# Patient Record
Sex: MALE | Race: WHITE | URBAN - METROPOLITAN AREA
[De-identification: names, ages, dates, MRNs, and addresses within clinical notes are randomized per-mention and may not be internally consistent; named-entity substitution may affect disease eponyms.]

---

## 2019-05-05 ENCOUNTER — INPATIENT (INPATIENT)
Facility: HOSPITAL | Age: 84
LOS: 2 days | Discharge: HOME HEALTH | End: 2019-05-08
Attending: HOSPITALIST | Admitting: HOSPITALIST
Payer: MEDICARE

## 2019-05-05 VITALS — WEIGHT: 184.97 LBS

## 2019-05-05 DIAGNOSIS — Z90.49 ACQUIRED ABSENCE OF OTHER SPECIFIED PARTS OF DIGESTIVE TRACT: Chronic | ICD-10-CM

## 2019-05-05 LAB
ALBUMIN SERPL ELPH-MCNC: 3.3 G/DL — SIGNIFICANT CHANGE UP (ref 3.3–5)
ALP SERPL-CCNC: 96 U/L — SIGNIFICANT CHANGE UP (ref 40–120)
ALT FLD-CCNC: 22 U/L — SIGNIFICANT CHANGE UP (ref 12–78)
ANION GAP SERPL CALC-SCNC: 5 MMOL/L — SIGNIFICANT CHANGE UP (ref 5–17)
APPEARANCE UR: CLEAR — SIGNIFICANT CHANGE UP
APTT BLD: 35.3 SEC — SIGNIFICANT CHANGE UP (ref 27.5–36.3)
AST SERPL-CCNC: 22 U/L — SIGNIFICANT CHANGE UP (ref 15–37)
BACTERIA # UR AUTO: NEGATIVE — SIGNIFICANT CHANGE UP
BASOPHILS # BLD AUTO: 0.03 K/UL — SIGNIFICANT CHANGE UP (ref 0–0.2)
BASOPHILS NFR BLD AUTO: 0.5 % — SIGNIFICANT CHANGE UP (ref 0–2)
BILIRUB SERPL-MCNC: 0.8 MG/DL — SIGNIFICANT CHANGE UP (ref 0.2–1.2)
BILIRUB UR-MCNC: NEGATIVE — SIGNIFICANT CHANGE UP
BUN SERPL-MCNC: 23 MG/DL — SIGNIFICANT CHANGE UP (ref 7–23)
CALCIUM SERPL-MCNC: 8.5 MG/DL — SIGNIFICANT CHANGE UP (ref 8.5–10.1)
CHLORIDE SERPL-SCNC: 107 MMOL/L — SIGNIFICANT CHANGE UP (ref 96–108)
CO2 SERPL-SCNC: 30 MMOL/L — SIGNIFICANT CHANGE UP (ref 22–31)
COLOR SPEC: YELLOW — SIGNIFICANT CHANGE UP
CREAT SERPL-MCNC: 1.19 MG/DL — SIGNIFICANT CHANGE UP (ref 0.5–1.3)
DIFF PNL FLD: ABNORMAL
EOSINOPHIL # BLD AUTO: 0.04 K/UL — SIGNIFICANT CHANGE UP (ref 0–0.5)
EOSINOPHIL NFR BLD AUTO: 0.6 % — SIGNIFICANT CHANGE UP (ref 0–6)
EPI CELLS # UR: NEGATIVE — SIGNIFICANT CHANGE UP
GLUCOSE SERPL-MCNC: 98 MG/DL — SIGNIFICANT CHANGE UP (ref 70–99)
GLUCOSE UR QL: NEGATIVE MG/DL — SIGNIFICANT CHANGE UP
HCT VFR BLD CALC: 35.1 % — LOW (ref 39–50)
HGB BLD-MCNC: 11.3 G/DL — LOW (ref 13–17)
IMM GRANULOCYTES NFR BLD AUTO: 0.6 % — SIGNIFICANT CHANGE UP (ref 0–1.5)
INR BLD: 2.21 RATIO — HIGH (ref 0.88–1.16)
KETONES UR-MCNC: NEGATIVE — SIGNIFICANT CHANGE UP
LEUKOCYTE ESTERASE UR-ACNC: NEGATIVE — SIGNIFICANT CHANGE UP
LYMPHOCYTES # BLD AUTO: 1.01 K/UL — SIGNIFICANT CHANGE UP (ref 1–3.3)
LYMPHOCYTES # BLD AUTO: 16.3 % — SIGNIFICANT CHANGE UP (ref 13–44)
MAGNESIUM SERPL-MCNC: 2.2 MG/DL — SIGNIFICANT CHANGE UP (ref 1.6–2.6)
MCHC RBC-ENTMCNC: 32.2 GM/DL — SIGNIFICANT CHANGE UP (ref 32–36)
MCHC RBC-ENTMCNC: 32.2 PG — SIGNIFICANT CHANGE UP (ref 27–34)
MCV RBC AUTO: 100 FL — SIGNIFICANT CHANGE UP (ref 80–100)
MONOCYTES # BLD AUTO: 0.54 K/UL — SIGNIFICANT CHANGE UP (ref 0–0.9)
MONOCYTES NFR BLD AUTO: 8.7 % — SIGNIFICANT CHANGE UP (ref 2–14)
NEUTROPHILS # BLD AUTO: 4.52 K/UL — SIGNIFICANT CHANGE UP (ref 1.8–7.4)
NEUTROPHILS NFR BLD AUTO: 73.3 % — SIGNIFICANT CHANGE UP (ref 43–77)
NITRITE UR-MCNC: NEGATIVE — SIGNIFICANT CHANGE UP
NRBC # BLD: 0 /100 WBCS — SIGNIFICANT CHANGE UP (ref 0–0)
NT-PROBNP SERPL-SCNC: 1430 PG/ML — HIGH (ref 0–450)
PH UR: 6 — SIGNIFICANT CHANGE UP (ref 5–8)
PLATELET # BLD AUTO: 182 K/UL — SIGNIFICANT CHANGE UP (ref 150–400)
POTASSIUM SERPL-MCNC: 4.5 MMOL/L — SIGNIFICANT CHANGE UP (ref 3.5–5.3)
POTASSIUM SERPL-SCNC: 4.5 MMOL/L — SIGNIFICANT CHANGE UP (ref 3.5–5.3)
PROT SERPL-MCNC: 7 GM/DL — SIGNIFICANT CHANGE UP (ref 6–8.3)
PROT UR-MCNC: NEGATIVE MG/DL — SIGNIFICANT CHANGE UP
PROTHROM AB SERPL-ACNC: 25.1 SEC — HIGH (ref 10–12.9)
RBC # BLD: 3.51 M/UL — LOW (ref 4.2–5.8)
RBC # FLD: 14.6 % — HIGH (ref 10.3–14.5)
RBC CASTS # UR COMP ASSIST: SIGNIFICANT CHANGE UP /HPF (ref 0–4)
SODIUM SERPL-SCNC: 142 MMOL/L — SIGNIFICANT CHANGE UP (ref 135–145)
SP GR SPEC: 1.01 — SIGNIFICANT CHANGE UP (ref 1.01–1.02)
TROPONIN I SERPL-MCNC: <0.015 NG/ML — SIGNIFICANT CHANGE UP (ref 0.01–0.04)
TROPONIN I SERPL-MCNC: <0.015 NG/ML — SIGNIFICANT CHANGE UP (ref 0.01–0.04)
UROBILINOGEN FLD QL: 1 MG/DL
WBC # BLD: 6.18 K/UL — SIGNIFICANT CHANGE UP (ref 3.8–10.5)
WBC # FLD AUTO: 6.18 K/UL — SIGNIFICANT CHANGE UP (ref 3.8–10.5)
WBC UR QL: NEGATIVE — SIGNIFICANT CHANGE UP

## 2019-05-05 PROCEDURE — 93010 ELECTROCARDIOGRAM REPORT: CPT

## 2019-05-05 PROCEDURE — 99285 EMERGENCY DEPT VISIT HI MDM: CPT | Mod: 25

## 2019-05-05 PROCEDURE — 71045 X-RAY EXAM CHEST 1 VIEW: CPT | Mod: 26

## 2019-05-05 PROCEDURE — 93970 EXTREMITY STUDY: CPT | Mod: 26

## 2019-05-05 RX ORDER — ASPIRIN/CALCIUM CARB/MAGNESIUM 324 MG
324 TABLET ORAL ONCE
Qty: 0 | Refills: 0 | Status: COMPLETED | OUTPATIENT
Start: 2019-05-05 | End: 2019-05-05

## 2019-05-05 RX ORDER — FUROSEMIDE 40 MG
40 TABLET ORAL ONCE
Qty: 0 | Refills: 0 | Status: COMPLETED | OUTPATIENT
Start: 2019-05-05 | End: 2019-05-05

## 2019-05-05 RX ADMIN — Medication 324 MILLIGRAM(S): at 18:32

## 2019-05-05 RX ADMIN — Medication 40 MILLIGRAM(S): at 20:27

## 2019-05-05 NOTE — ED ADULT NURSE NOTE - NSIMPLEMENTINTERV_GEN_ALL_ED
Implemented All Fall with Harm Risk Interventions:  [place] to call system. Call bell, personal items and telephone within reach. Instruct patient to call for assistance. Room bathroom lighting operational. Non-slip footwear when patient is off stretcher. Physically safe environment: no spills, clutter or unnecessary equipment. Stretcher in lowest position, wheels locked, appropriate side rails in place. Provide visual cue, wrist band, yellow gown, etc. Monitor gait and stability. Monitor for mental status changes and reorient to person, place, and time. Review medications for side effects contributing to fall risk. Reinforce activity limits and safety measures with patient and family. Provide visual clues: red socks.

## 2019-05-05 NOTE — ED STATDOCS - NS_ ATTENDINGSCRIBEDETAILS _ED_A_ED_FT
I, Juanito Begum MD,  performed the initial face to face bedside interview with this patient regarding history of present illness, review of symptoms and relevant past medical, social and family history.  I completed an independent physical examination.  I was the initial provider who evaluated this patient.  The history, relevant review of systems, past medical and surgical history, medical decision making, and physical examination was documented by the scribe in my presence and I attest to the accuracy of the documentation.

## 2019-05-05 NOTE — ED ADULT NURSE NOTE - NS ED NURSE REPORT GIVEN TO FT
Have You Had Red Spots Treated With Laser Before?: has had previous treatments
When Outside In The Sun, Do You...: mostly tans, rarely burns
Additional History: No autoimmune,hiv,HSV,hepatitis,diabetes,pregnancy or breast feeding
XIN Oliveira

## 2019-05-05 NOTE — ED STATDOCS - PROGRESS NOTE DETAILS
Joanne Singer for attending Dr. Begum: 89 y/o male with a PMHx of pacemaker presents to the ED c/o CP. +SOB, +weakness, +nausea. Sx started this morning. No other complaints at this time. Will send pt to main ED for further evaluation.

## 2019-05-05 NOTE — ED PROVIDER NOTE - CLINICAL SUMMARY MEDICAL DECISION MAKING FREE TEXT BOX
Pt with likely CHF as cause of symptoms, given Lasix, admitted to medicine service for cardiology evaluation.

## 2019-05-05 NOTE — ED ADULT TRIAGE NOTE - CHIEF COMPLAINT QUOTE
pt presents to ED due to Chest pain SOB and weakness since this AM as per daughter pt resting comfortably at this time

## 2019-05-05 NOTE — ED PROVIDER NOTE - PROGRESS NOTE DETAILS
Joanne Blanco: Maria Antonia, the daughter, left her number: [phone]. Keenan, the son, left his number: [phone].

## 2019-05-05 NOTE — H&P ADULT - NSICDXPASTMEDICALHX_GEN_ALL_CORE_FT
PAST MEDICAL HISTORY:  BPH (benign prostatic hyperplasia)     Coronary artery disease     CVA (cerebrovascular accident)     HTN (hypertension)     Pacemaker

## 2019-05-05 NOTE — H&P ADULT - NSHPSOCIALHISTORY_GEN_ALL_CORE
Pt lives alone at home, has home health aid who comes every day for 10hrs/day, uses walker and cane at home Pt lives alone at home, has home health aid who comes every day for 10hrs/day, uses walker and cane at home; h/o smoking , quit ~40 years ago, denies alcohol use

## 2019-05-05 NOTE — H&P ADULT - ATTENDING COMMENTS
Patient seen and examined after initial evaluation above by family medicine resident. Case discussed and reviewed in detail. Please note my plan below    89 y/o M with PMH of CAD s/p PCI, a-fib (on coumadin), PPM, HTN, CVA, BPH, depression, p/w CP + SOB      *Chest Pain - r/o ACS  -ASA   -Tele monitoring  -Cardio consult  -Trend Troponins  -Serial EKG    *SOB / LE edema   -Possible CHF  -Echo  -S/p Lasix IV in ED, will c/w Lasix 20 IV daily for now   -Cardio consult  -I/Os + Daily weights   -On BB  -If CHF confirmed -> will need to start ACEi    *Abdominal pain / nausea / loss of appetite  -As per patient, symptoms have completely resolved.  -If symptoms return -> consider CT abdomen     *H/o A-fib  -On coumadin    *H/o HTN / CVA / BPH / depression  -C/w home meds and outpatient management     *DVT ppx  -Coumadin

## 2019-05-05 NOTE — ED PROVIDER NOTE - OBJECTIVE STATEMENT
89 y/o male with a PMHx of pacemaker s/p stents presents to the ED c/o chest pain x3 days ago. +SOB, +weakness, +nausea. Pt states he is feeling a chest pressure and a burning feeling in his abd area x3 days ago. +nausea. Denies vomiting. No other complaints at this time. As per daughter, she has not noticed his leg swelling recently and has been losing his appetite as well. Pt speaks Hebrew and has daughter translating for him at bedside.

## 2019-05-05 NOTE — H&P ADULT - NSHPPHYSICALEXAM_GEN_ALL_CORE
Vital Signs Last 24 Hrs  T(C): 36.4 (05 May 2019 20:28), Max: 36.6 (05 May 2019 17:30)  T(F): 97.5 (05 May 2019 20:28), Max: 97.8 (05 May 2019 17:30)  HR: 60 (05 May 2019 21:30) (60 - 62)  BP: 132/68 (05 May 2019 21:30) (132/68 - 167/62)  RR: 19 (05 May 2019 21:58) (12 - 20)  SpO2: 100% (05 May 2019 21:58) (91% - 100%)

## 2019-05-05 NOTE — H&P ADULT - ASSESSMENT
89 yo male with h/o CAD (with stents), reported h/o Afib, has pacemaker, on coumadin, HTN, CVA (8-10 yrs ago), BPH, depression, presenting with c/o chest discomfort, nausea and loss of appetite, pt also with b/l LE edema and elevated BNP, CXR with R pleural effusion (official read pending), concerning for likely acute CHF.    # Chest discomfort  - Likely secondary to acute CHF  - EKG with NSR  - Trop neg x2  - BNP of 1430  - CXR - R pleural effusion, + cardiomegaly per read by me. F/U official read  - Pt with b/l LE edema  - f/u 3rd troponin  - f/u LE doppler US  - Echo in the AM  - Cardiology consult    # CAD with stents  - Not on ASA, pt reports h/o gastritis    #h/o Afib  - Continue home coumadin  - INR therapeutic  - Continue home BB  - CHADSVASc score 6    #BPH  - Continue home tamsulosin and finasteride    #Depression  - Continue on home zoloft    #DVT ppx  - On coumadin 89 yo male with h/o CAD (with stents), reported h/o Afib, has pacemaker, on coumadin, HTN, CVA (8-10 yrs ago), BPH, depression, presenting with c/o chest discomfort, pressure in abdomen, nausea and loss of appetite, pt also with b/l LE edema and elevated BNP, CXR with R pleural effusion (official read pending), concerning for likely acute CHF.    # Chest discomfort  - Likely secondary to acute CHF  - EKG - ventricular paced, no obvious ST-T wave changes  - Trop neg x2  - BNP of 1430  - Tele monitoring  - CXR - R pleural effusion, + cardiomegaly per read by me. F/U official read  - s/p ASA 325mg in the ED  - s/p Lasix 40mg IV in the ED  - Pt with b/l LE edema  - Continue on lasix 20mg IV daily  - Daily weight, strict I/Os  - f/u 3rd troponin  - f/u LE doppler US  - f/u AM EKG  - Echo in the AM  - Cardiology consult  - Consider ACE-I pending echo result    #Abdominal pressure, nausea  - Pt with resolved complaints on examination, no tenderness    # CAD with stents  - Not on ASA, pt reports h/o gastritis  - Pt not on statin, pt reports that he was never placed on statin med - reason unknown  - Will start atorvastatin 40mg daily    #h/o Afib  - Continue home coumadin  - INR therapeutic  - Continue home BB  - CHADSVASc score 6    #BPH  - Continue home tamsulosin and finasteride    #Depression  - Continue on home zoloft    #DVT ppx  - On coumadin 89 yo male with h/o CAD (with stents), reported h/o Afib, has pacemaker, on coumadin, HTN, CVA (8-10 yrs ago), BPH, depression, presenting with c/o chest discomfort, pressure in abdomen, nausea and loss of appetite, pt also with b/l LE edema and elevated BNP, CXR with R pleural effusion (official read pending), concerning for likely acute CHF.    # Chest discomfort  - Likely secondary to acute CHF  - EKG - ventricular paced, no obvious ST-T wave changes  - Trop neg x2  - BNP of 1430  - Tele monitoring  - CXR - R pleural effusion, + cardiomegaly per read by me. F/U official read  - s/p ASA 325mg in the ED  - s/p Lasix 40mg IV in the ED  - Pt with b/l LE edema  - Continue on lasix 20mg IV daily  - Daily weight, strict I/Os  - f/u 3rd troponin  - f/u LE doppler US  - f/u AM EKG  - Echo in the AM  - Cardiology consult  - Consider ACE-I pending echo result    #Abdominal pressure, nausea  - Pt with resolved complaints on examination, no tenderness    # CAD with stents  - Not on ASA, pt reports h/o gastritis  - Pt not on statin, pt reports that he was never placed on statin med - reason unknown.   - Will start atorvastatin 40mg daily    #h/o Afib  - Continue home coumadin  - INR therapeutic  - Continue home BB  - CHADSVASc score 6    #BPH  - Continue home tamsulosin and finasteride    #Depression  - Continue on home zoloft    #DVT ppx  - On coumadin

## 2019-05-05 NOTE — H&P ADULT - HISTORY OF PRESENT ILLNESS
89 yo male with h/o CAD (with stents), reported h/o Afib, has pacemaker, on coumadin, HTN, CVA (8-10 yrs ago), BPH, depression, presenting with c/o chest discomfort, SOB, nausea and loss of appetite. Per pt, his sxs started with feeling of pressure in the upper abdomen and midsternal region 3 days ago. He has been unable to eat as a result of the pressure he feels in the sternal region, also has been feeling nauseous and with weakness. Pt states that it is the nausea, abdominal pressure, loss of appetite and feeling depressed that brought him to the ED. He denies any aggravating or alleviating factors, denies actual chest pain and/or radiation of pain to the neck or arm. Per ED note, pt also said to have SOB. Pt however denied SOB at home when asked. He reports sleeping supine at night without any difficulty breathing. In addition to this, pt also has b/l LE swelling without pain, which he reports has been present for a long time. Pt reports no previous history/diagnosis of CHF, and has never has to take lasix. Pt denied any chest pain/pressure, abdominal pain, n/v, cough, SOB at the time examined in the ED by me. No fever or chills at home.  I spoke over the phone with pt's daughter (Ana Cristina Berger) who states that pt did complain of chest pressure and pressure in the upper abdomen today. For the past 3 days has been complaining of pressure in the abdomen, feeling weak, with loss of appetite. The complain of chest pressure, as per daughter, was mainly on day of ED presentation. Per daughter, swelling in b/l LE has also been present for years (4-5 years) but appeared more pronounced on day of ED presentation.    In the ED, pt received ASA 325mg x1, CXR was obtained showed R lung pleural effusion (official read pending), BNP of 1430, trops neg x2. Pt was given a dose of IV lasix 40mg. LE doppler US was ordered. 89 yo male with h/o CAD (with stents), reported h/o Afib, has pacemaker, on coumadin, HTN, CVA (8-10 yrs ago), BPH, depression, presenting with c/o chest discomfort, SOB, nausea and loss of appetite. Per pt, his sxs started with feeling of pressure in the upper abdomen and midsternal region 3 days ago. He has been unable to eat as a result of the pressure he feels in the sternal region, also has been feeling nauseous and with weakness. Pt states that it is the nausea, abdominal pressure, loss of appetite and feeling depressed that brought him to the ED. He denies any aggravating or alleviating factors, denies actual chest pain and/or radiation of pain to the neck or arm. Per ED note, pt also said to have SOB. Pt however denied SOB at home when asked. He reports sleeping supine at night without any difficulty breathing. In addition to this, pt also has b/l LE swelling without pain, which he reports has been present for a long time. Pt reports no previous history/diagnosis of CHF, and has never had to take lasix. Pt denied any chest pain/pressure, abdominal pain, n/v, cough, SOB at the time examined in the ED by me. No fever or chills at home.  I spoke over the phone with pt's daughter (Ana Cristina Berger) who states that pt did complain of chest pressure and pressure in the upper abdomen today. For the past 3 days has been complaining of pressure in the abdomen, feeling weak, with loss of appetite. The complain of chest pressure, as per daughter, was mainly on day of ED presentation. Per daughter, swelling in b/l LE has also been present for years (4-5 years) but appeared more pronounced on day of ED presentation.    In the ED, pt received ASA 325mg x1, CXR was obtained showed R lung pleural effusion (official read pending), BNP of 1430, trops neg x2. Pt was given a dose of IV lasix 40mg. LE doppler US was ordered. 89 yo male with h/o CAD (with stents), reported h/o Afib, has pacemaker, on coumadin, HTN, CVA (8-10 yrs ago), BPH, depression, presenting with c/o chest discomfort, SOB, nausea and loss of appetite. Per pt, his sxs started with feeling of pressure in the upper abdomen and midsternal region 3 days ago. He has been unable to eat as a result of the pressure he feels in the sternal region, also has been feeling nauseous and with weakness. Pt states that it is the nausea, abdominal pressure, loss of appetite and feeling depressed that brought him to the ED, but all the symptoms are presently resolved. He denies any aggravating or alleviating factors, denies actual chest pain and/or radiation of pain to the neck or arm. Per ED note, pt also said to have SOB. Pt however denied SOB at home when asked. He reports sleeping supine at night without any difficulty breathing. In addition to this, pt also has b/l LE swelling without pain, which he reports has been present for a long time. Pt reports no previous history/diagnosis of CHF, and has never had to take lasix. Pt denied any chest pain/pressure, abdominal pain, n/v, cough, SOB at the time examined in the ED by me. No fever or chills at home.  I spoke over the phone with pt's daughter (Ana Cristina Berger) who states that pt did complain of chest pressure and pressure in the upper abdomen today. For the past 3 days has been complaining of pressure in the abdomen, feeling weak, with loss of appetite. The complain of chest pressure, as per daughter, was mainly on day of ED presentation. Per daughter, swelling in b/l LE has also been present for years (4-5 years) but appeared more pronounced on day of ED presentation.    In the ED, pt received ASA 325mg x1, CXR was obtained showed R lung pleural effusion (official read pending), BNP of 1430, trops neg x2. Pt was given a dose of IV lasix 40mg. LE doppler US was ordered.

## 2019-05-06 LAB
ANION GAP SERPL CALC-SCNC: 4 MMOL/L — LOW (ref 5–17)
APTT BLD: 39.1 SEC — HIGH (ref 27.5–36.3)
BUN SERPL-MCNC: 20 MG/DL — SIGNIFICANT CHANGE UP (ref 7–23)
CALCIUM SERPL-MCNC: 8.8 MG/DL — SIGNIFICANT CHANGE UP (ref 8.5–10.1)
CHLORIDE SERPL-SCNC: 105 MMOL/L — SIGNIFICANT CHANGE UP (ref 96–108)
CO2 SERPL-SCNC: 32 MMOL/L — HIGH (ref 22–31)
CREAT SERPL-MCNC: 1.2 MG/DL — SIGNIFICANT CHANGE UP (ref 0.5–1.3)
GLUCOSE SERPL-MCNC: 80 MG/DL — SIGNIFICANT CHANGE UP (ref 70–99)
HCT VFR BLD CALC: 34.8 % — LOW (ref 39–50)
HGB BLD-MCNC: 11.2 G/DL — LOW (ref 13–17)
INR BLD: 2.27 RATIO — HIGH (ref 0.88–1.16)
MCHC RBC-ENTMCNC: 31.5 PG — SIGNIFICANT CHANGE UP (ref 27–34)
MCHC RBC-ENTMCNC: 32.2 GM/DL — SIGNIFICANT CHANGE UP (ref 32–36)
MCV RBC AUTO: 97.8 FL — SIGNIFICANT CHANGE UP (ref 80–100)
NRBC # BLD: 0 /100 WBCS — SIGNIFICANT CHANGE UP (ref 0–0)
PLATELET # BLD AUTO: 186 K/UL — SIGNIFICANT CHANGE UP (ref 150–400)
POTASSIUM SERPL-MCNC: 3.7 MMOL/L — SIGNIFICANT CHANGE UP (ref 3.5–5.3)
POTASSIUM SERPL-SCNC: 3.7 MMOL/L — SIGNIFICANT CHANGE UP (ref 3.5–5.3)
PROTHROM AB SERPL-ACNC: 25.9 SEC — HIGH (ref 10–12.9)
RBC # BLD: 3.56 M/UL — LOW (ref 4.2–5.8)
RBC # FLD: 14.6 % — HIGH (ref 10.3–14.5)
SODIUM SERPL-SCNC: 141 MMOL/L — SIGNIFICANT CHANGE UP (ref 135–145)
WBC # BLD: 4.53 K/UL — SIGNIFICANT CHANGE UP (ref 3.8–10.5)
WBC # FLD AUTO: 4.53 K/UL — SIGNIFICANT CHANGE UP (ref 3.8–10.5)

## 2019-05-06 PROCEDURE — 93010 ELECTROCARDIOGRAM REPORT: CPT

## 2019-05-06 PROCEDURE — 93306 TTE W/DOPPLER COMPLETE: CPT | Mod: 26

## 2019-05-06 RX ORDER — FAMOTIDINE 10 MG/ML
20 INJECTION INTRAVENOUS DAILY
Qty: 0 | Refills: 0 | Status: DISCONTINUED | OUTPATIENT
Start: 2019-05-06 | End: 2019-05-08

## 2019-05-06 RX ORDER — TAMSULOSIN HYDROCHLORIDE 0.4 MG/1
0.4 CAPSULE ORAL AT BEDTIME
Qty: 0 | Refills: 0 | Status: DISCONTINUED | OUTPATIENT
Start: 2019-05-06 | End: 2019-05-08

## 2019-05-06 RX ORDER — FUROSEMIDE 40 MG
20 TABLET ORAL EVERY 12 HOURS
Qty: 0 | Refills: 0 | Status: DISCONTINUED | OUTPATIENT
Start: 2019-05-06 | End: 2019-05-07

## 2019-05-06 RX ORDER — LANOLIN ALCOHOL/MO/W.PET/CERES
5 CREAM (GRAM) TOPICAL AT BEDTIME
Qty: 0 | Refills: 0 | Status: DISCONTINUED | OUTPATIENT
Start: 2019-05-06 | End: 2019-05-08

## 2019-05-06 RX ORDER — FAMOTIDINE 10 MG/ML
20 INJECTION INTRAVENOUS EVERY 24 HOURS
Qty: 0 | Refills: 0 | Status: DISCONTINUED | OUTPATIENT
Start: 2019-05-06 | End: 2019-05-06

## 2019-05-06 RX ORDER — WARFARIN SODIUM 2.5 MG/1
2 TABLET ORAL ONCE
Qty: 0 | Refills: 0 | Status: COMPLETED | OUTPATIENT
Start: 2019-05-06 | End: 2019-05-06

## 2019-05-06 RX ORDER — ATORVASTATIN CALCIUM 80 MG/1
40 TABLET, FILM COATED ORAL AT BEDTIME
Qty: 0 | Refills: 0 | Status: DISCONTINUED | OUTPATIENT
Start: 2019-05-06 | End: 2019-05-08

## 2019-05-06 RX ORDER — FUROSEMIDE 40 MG
20 TABLET ORAL DAILY
Qty: 0 | Refills: 0 | Status: DISCONTINUED | OUTPATIENT
Start: 2019-05-06 | End: 2019-05-06

## 2019-05-06 RX ORDER — TIMOLOL 0.5 %
1 DROPS OPHTHALMIC (EYE) DAILY
Qty: 0 | Refills: 0 | Status: DISCONTINUED | OUTPATIENT
Start: 2019-05-06 | End: 2019-05-08

## 2019-05-06 RX ORDER — SERTRALINE 25 MG/1
25 TABLET, FILM COATED ORAL DAILY
Qty: 0 | Refills: 0 | Status: DISCONTINUED | OUTPATIENT
Start: 2019-05-06 | End: 2019-05-08

## 2019-05-06 RX ORDER — FINASTERIDE 5 MG/1
5 TABLET, FILM COATED ORAL DAILY
Qty: 0 | Refills: 0 | Status: DISCONTINUED | OUTPATIENT
Start: 2019-05-06 | End: 2019-05-08

## 2019-05-06 RX ORDER — METOPROLOL TARTRATE 50 MG
25 TABLET ORAL DAILY
Qty: 0 | Refills: 0 | Status: DISCONTINUED | OUTPATIENT
Start: 2019-05-06 | End: 2019-05-08

## 2019-05-06 RX ADMIN — FINASTERIDE 5 MILLIGRAM(S): 5 TABLET, FILM COATED ORAL at 13:36

## 2019-05-06 RX ADMIN — TAMSULOSIN HYDROCHLORIDE 0.4 MILLIGRAM(S): 0.4 CAPSULE ORAL at 22:45

## 2019-05-06 RX ADMIN — SERTRALINE 25 MILLIGRAM(S): 25 TABLET, FILM COATED ORAL at 13:36

## 2019-05-06 RX ADMIN — ATORVASTATIN CALCIUM 40 MILLIGRAM(S): 80 TABLET, FILM COATED ORAL at 22:45

## 2019-05-06 RX ADMIN — WARFARIN SODIUM 2 MILLIGRAM(S): 2.5 TABLET ORAL at 22:45

## 2019-05-06 RX ADMIN — Medication 20 MILLIGRAM(S): at 17:31

## 2019-05-06 RX ADMIN — FAMOTIDINE 20 MILLIGRAM(S): 10 INJECTION INTRAVENOUS at 06:14

## 2019-05-06 RX ADMIN — Medication 5 MILLIGRAM(S): at 22:45

## 2019-05-06 RX ADMIN — Medication 25 MILLIGRAM(S): at 06:15

## 2019-05-06 RX ADMIN — Medication 1 DROP(S): at 13:38

## 2019-05-06 NOTE — CHART NOTE - NSCHARTNOTEFT_GEN_A_CORE
Pt seen and examined with house staff.  Plan formulated and reviewed on rounds     Briefly, 89 y/o male with PPM--AFIb on coumadin and HL admitted with CP found to have evidence of decompensated HF.  CXR--PVC.  BNP 1400.  LE edema    CP has resolved  No events overnight   ROS o/w negative     Stable vitals.  Warm  Awake and alert  NAD  LE 2+ Edema    INR 2.2  WBC 6  Trop Negative x 2    Acute Decompensated HF--no prior TTE    Check TTE  Increase furosemide to 20 IV q12  Daily wts  DASH diet  Daily INR--dose coumadin according    Tele monitoring

## 2019-05-06 NOTE — PROGRESS NOTE ADULT - ASSESSMENT
89 yo male with h/o CAD (with stents), reported h/o Afib, has pacemaker, on coumadin, HTN, CVA (8-10 yrs ago), BPH, depression, presenting with c/o chest discomfort, pressure in abdomen, nausea and loss of appetite, pt also with b/l LE edema and elevated BNP, CXR with R pleural effusion, concerning for likely acute CHF.    #Dyspnea  - Likely 2/2 CHF  - BNP of 1430 on admission  - will monitor on telemetry  - CXR - R pleural effusion and b/l congestion- official read pending  - s/p Lasix 40mg IV in the ED  - will start lasix 20 mg IV BID  - daily weights  - low salt diet  - 1.5 L daily fluid restriction  - strict I/Os  - f/u LE doppler US  - f/u ECHO   - Cardiology consult pending     #Chest discomfort  - likely 2/2 to GERD  - EKG - ventricular paced, no obvious ST-T wave changes  - Trop neg x2  - s/p ASA 325mg in the ED  - f/u 3rd troponin  - continue Pepcid  - cardiology consult pending    #CAD with stents  - not on aspirin due to hx of gastritis  - not on statin - unknown resason  - will hold off statins for now    #Afib  - CHADSVASc score 6  - rate controlled  - continue coumadin  - INR therapeutic on admission  - continue metoprolol   - monitor INR daily; goal 2-3    #Anemia  - normocytic  - no signs of active bleeding  - will monitor    #BPH  - continue tamsulosin   - continue finasteride    #Depression  - continue zoloft    #DVT ppx  - on coumadin 89 yo male with h/o CAD (with stents), reported h/o Afib, has pacemaker, on coumadin, HTN, CVA (8-10 yrs ago), BPH, depression, presenting with c/o chest discomfort, pressure in abdomen, nausea and loss of appetite, pt also with b/l LE edema and elevated BNP, CXR with R pleural effusion, concerning for likely acute CHF.    #Dyspnea  - Likely 2/2 CHF  - BNP of 1430 on admission  - will monitor on telemetry  - CXR - R pleural effusion and b/l congestion- official read pending  - s/p Lasix 40mg IV in the ED  - will start lasix 20 mg IV BID  - daily weights  - low salt diet  - 1.5 L daily fluid restriction  - strict I/Os  - f/u LE doppler US  - f/u ECHO   - Cardiology consult pending     #Chest discomfort  - likely 2/2 to GERD  - EKG - ventricular paced, no obvious ST-T wave changes  - Trop neg x2  - s/p ASA 325mg in the ED  - continue Pepcid  - cardiology consult pending    #CAD with stents  - not on aspirin due to hx of gastritis  - not on statin - unknown resason  - will hold off statins for now    #Afib  - CHADSVASc score 6  - rate controlled  - continue coumadin  - INR therapeutic on admission  - continue metoprolol   - monitor INR daily; goal 2-3    #Anemia  - normocytic  - no signs of active bleeding  - will monitor    #BPH  - continue tamsulosin   - continue finasteride    #Depression  - continue zoloft    #DVT ppx  - on coumadin 89 yo male with h/o CAD (with stents), reported h/o Afib, has pacemaker, on coumadin, HTN, CVA (8-10 yrs ago), BPH, depression, presenting with c/o chest discomfort, pressure in abdomen, nausea and loss of appetite, pt also with b/l LE edema and elevated BNP, CXR with R pleural effusion, concerning for likely acute CHF.    #Dyspnea  - Likely 2/2 CHF  - BNP of 1430 on admission  - will monitor on telemetry  - CXR - probable R pleural effusion   - s/p Lasix 40mg IV in the ED  - will start lasix 20 mg IV BID  - monitor electrolytes  - daily weights  - low salt diet  - 1.5 L daily fluid restriction  - strict I/Os  - LE doppler done for b/l LE edema - negative for DVT  - f/u ECHO   - Cardiology consult appreciated     #Chest discomfort  - likely 2/2 to GERD  - EKG - ventricular paced, no obvious ST-T wave changes  - Trop neg x2  - s/p ASA 325mg in the ED  - continue Pepcid  - cardiology consult appreciated    #CAD with stents  - not on aspirin due to hx of gastritis  - continue statin    #Afib  - CHADSVASc score 6  - rate controlled  - continue coumadin  - INR therapeutic on admission  - continue metoprolol   - monitor INR daily; goal 2-3  - pacemaker interrogation revealed afib w/ rate of 60bpm    #Anemia  - normocytic  - no signs of active bleeding  - will monitor    #BPH  - continue tamsulosin   - continue finasteride    #Depression  - continue zoloft    #DVT ppx  - on coumadin

## 2019-05-06 NOTE — PHARMACOTHERAPY INTERVENTION NOTE - COMMENTS
Obtained medication history from patient's daughter and list from [facility] . Confirmed with Dr. Del Cid
Recommended changing IV to PO
Recommended ordering warfarin dose for tonight

## 2019-05-06 NOTE — CONSULT NOTE ADULT - SUBJECTIVE AND OBJECTIVE BOX
Patient is a 89y old  Male who presents with a chief complaint of SOB and chest discomfort.     HPI:  89 yo male with h/o CAD (with stents), reported h/o Afib, has pacemaker, on coumadin, HTN, CVA (8-10 yrs ago), BPH, depression, presenting with c/o chest discomfort, SOB, nausea and loss of appetite. Pt this am was unable to give more history but states that he was feeling depressed as his legs were swollen.  He denies any SOB or CP this am.      In the ED, pt received ASA 325mg x1, CXR was obtained showed R lung pleural effusion (official read pending), BNP of 1430, trops neg x2. Pt was given a dose of IV lasix 40mg. LE doppler US was ordered.       PAST MEDICAL & SURGICAL HISTORY:  CVA (cerebrovascular accident)  BPH (benign prostatic hyperplasia)  HTN (hypertension)  Coronary artery disease  Pacemaker  History of appendectomy      MEDICATIONS  (STANDING):  atorvastatin 40 milliGRAM(s) Oral at bedtime  famotidine Injectable 20 milliGRAM(s) IV Push every 24 hours  finasteride 5 milliGRAM(s) Oral daily  furosemide   Injectable 20 milliGRAM(s) IV Push daily  metoprolol succinate ER 25 milliGRAM(s) Oral daily  sertraline 25 milliGRAM(s) Oral daily  tamsulosin 0.4 milliGRAM(s) Oral at bedtime  timolol 0.5% Solution 1 Drop(s) Both EYES daily    MEDICATIONS  (PRN):      FAMILY HISTORY:  unable to obtain from pt       SOCIAL HISTORY:  no recent smoking     REVIEW OF SYSTEMS:  CONSTITUTIONAL:    No fatigue, malaise, lethargy.  No fever or chills.  HEENT:  Eyes:  No visual changes.     ENT:  No epistaxis.  No sinus pain.    RESPIRATORY:  No cough.  No wheeze.  No hemoptysis.  c/o shortness of breath.  CARDIOVASCULAR:  c/o chest pains.  No palpitations. c/o shortness of breath, No orthopnea or PND.  GASTROINTESTINAL:  No abdominal pain.  No nausea or vomiting.    GENITOURINARY:    No hematuria.    MUSCULOSKELETAL:  No musculoskeletal pain.  No joint swelling.  No arthritis.  NEUROLOGICAL:  No tingling or numbness or weakness.  PSYCHIATRIC:  c/o depression        Vital Signs Last 24 Hrs  T(C): 36.3 (06 May 2019 04:29), Max: 36.6 (05 May 2019 17:30)  T(F): 97.4 (06 May 2019 04:29), Max: 97.8 (05 May 2019 17:30)  HR: 60 (06 May 2019 04:29) (59 - 62)  BP: 110/60 (06 May 2019 04:29) (110/60 - 167/62)  BP(mean): --  RR: 19 (06 May 2019 04:29) (12 - 20)  SpO2: 91% (06 May 2019 04:29) (91% - 100%)    PHYSICAL EXAM-    Constitutional: elderly frail and in no acute distress    Head: Head is normocephalic and atraumatic.      Neck:  No JVD.     Cardiovascular: Regular rate and rhythm without S3, S4. No murmurs or rubs are appreciated.      Respiratory: scattered rales b/l     Abdomen: Soft, nontender, nondistended with positive bowel sounds.      Extremity: No tenderness. 1+ pedal edema b/l   Neurologic: The patient is alert     Skin: No rash, no obvious lesions noted.      Psychiatric: The patient appears to be emotionally stable.      INTERPRETATION OF TELEMETRY:   SR   ECG: Sinus danythm ALYSSA paced.    I&O's Detail    05 May 2019 07:01  -  06 May 2019 07:00  --------------------------------------------------------  IN:  Total IN: 0 mL    OUT:    Voided: 1770 mL  Total OUT: 1770 mL    Total NET: -1770 mL          LABS:                        11.2   4.53  )-----------( 186      ( 06 May 2019 05:58 )             34.8     05-06    141  |  105  |  20  ----------------------------<  80  3.7   |  32<H>  |  1.20    Ca    8.8      06 May 2019 05:58  Mg     2.2     05-05    TPro  7.0  /  Alb  3.3  /  TBili  0.8  /  DBili  x   /  AST  22  /  ALT  22  /  AlkPhos  96  05-05    CARDIAC MARKERS ( 05 May 2019 20:41 )  <0.015 ng/mL / x     / x     / x     / x      CARDIAC MARKERS ( 05 May 2019 18:28 )  <0.015 ng/mL / x     / x     / x     / x          PT/INR - ( 06 May 2019 05:58 )   PT: 25.9 sec;   INR: 2.27 ratio         PTT - ( 06 May 2019 05:58 )  PTT:39.1 sec  Urinalysis Basic - ( 05 May 2019 20:23 )    Color: Yellow / Appearance: Clear / S.010 / pH: x  Gluc: x / Ketone: Negative  / Bili: Negative / Urobili: 1 mg/dL   Blood: x / Protein: Negative mg/dL / Nitrite: Negative   Leuk Esterase: Negative / RBC: 0-2 /HPF / WBC Negative   Sq Epi: x / Non Sq Epi: Negative / Bacteria: Negative      I&O's Summary    05 May 2019 07:01  -  06 May 2019 07:00  --------------------------------------------------------  IN: 0 mL / OUT: 1770 mL / NET: -1770 mL      BNPSerum Pro-Brain Natriuretic Peptide: 1430 pg/mL ( @ 18:28)    RADIOLOGY & ADDITIONAL STUDIES:

## 2019-05-06 NOTE — CONSULT NOTE ADULT - ASSESSMENT
1. SOB and chest pain- currently asymptomatic.  Cardiac enzymes did not reveal any ischemia.  EKG uninterpretable for ischemia.   Doubt angina.  Could be secondary to volume overload.  Will continue diuresis with iv lasix.  Can increase lasix to 40mg iv daily.  Diuresis with close monitoring of the renal function and electrolytes.  Goal potassium of 4 and magnesium of 2.   Strict I/O and daily wt checks. Low sodium diet. Nutrition education.     2. s/p pPM- interrogation requested.    3. CAD s/p PCI- off ASA as outpt.  Continue statin and rest of current medical regimen.    4. Atrial fibrillaiton- on full dose anticoagulation.    Other medical issues- Management per primary team.   Thank you for allowing me to participate in the care of this patient. Please feel free to contact me with any questions.

## 2019-05-06 NOTE — PROGRESS NOTE ADULT - SUBJECTIVE AND OBJECTIVE BOX
89 yo male with h/o CAD (with stents), reported h/o Afib, has pacemaker, on coumadin, HTN, CVA (8-10 yrs ago), BPH, depression, presenting with c/o chest discomfort, SOB, nausea and loss of appetite. Per pt, his sxs started with feeling of pressure in the upper abdomen and midsternal region 3 days ago. He has been unable to eat as a result of the pressure he feels in the sternal region, also has been feeling nauseous and with weakness. Pt states that it is the nausea, abdominal pressure, loss of appetite and feeling depressed that brought him to the ED, but all the symptoms are presently resolved. He denies any aggravating or alleviating factors, denies actual chest pain and/or radiation of pain to the neck or arm. Per ED note, pt also said to have SOB. Pt however denied SOB at home when asked. He reports sleeping supine at night without any difficulty breathing. In addition to this, pt also has b/l LE swelling without pain, which he reports has been present for a long time. Pt reports no previous history/diagnosis of CHF, and has never had to take lasix. Pt denied any chest pain/pressure, abdominal pain, n/v, cough, SOB at the time examined in the ED by me. No fever or chills at home.  I spoke over the phone with pt's daughter (Ana Cristina Berger) who states that pt did complain of chest pressure and pressure in the upper abdomen today. For the past 3 days has been complaining of pressure in the abdomen, feeling weak, with loss of appetite. The complain of chest pressure, as per daughter, was mainly on day of ED presentation. Per daughter, swelling in b/l LE has also been present for years (4-5 years) but appeared more pronounced on day of ED presentation.    In the ED, pt received ASA 325mg x1, CXR was obtained showed R lung pleural effusion (official read pending), BNP of 1430, trops neg x2. Pt was given a dose of IV lasix 40mg. LE doppler US was ordered.      Interval History: Patient evaluated at bedside. No overnight events. Endorses SOB. Denies chest pain, orthopnea, PND, cough, fever, chills.     REVIEW OF SYSTEMS:    CONSTITUTIONAL: No fevers or chills  EYES/ENT: No visual changes;  No vertigo or throat pain   NECK: No pain or stiffness  RESPIRATORY: No cough, wheezing, hemoptysis; +shortness of breath  CARDIOVASCULAR: No chest pain or palpitations  GASTROINTESTINAL: No abdominal or epigastric pain. No nausea, vomiting, or hematemesis; No diarrhea or constipation. No melena or hematochezia.  GENITOURINARY: No dysuria, frequency or hematuria  NEUROLOGICAL: No numbness or weakness  SKIN: No itching, burning, rashes, or lesions   All other review of systems is negative unless indicated above.    MEDICATIONS  (STANDING):  atorvastatin 40 milliGRAM(s) Oral at bedtime  famotidine    Tablet 20 milliGRAM(s) Oral daily  finasteride 5 milliGRAM(s) Oral daily  furosemide   Injectable 20 milliGRAM(s) IV Push every 12 hours  metoprolol succinate ER 25 milliGRAM(s) Oral daily  sertraline 25 milliGRAM(s) Oral daily  tamsulosin 0.4 milliGRAM(s) Oral at bedtime  timolol 0.5% Solution 1 Drop(s) Both EYES daily  warfarin 2 milliGRAM(s) Oral once    MEDICATIONS  (PRN):      Vital Signs (24 Hrs):  T(C): 36.6 (19 @ 10:48), Max: 36.6 (19 @ 17:30)  HR: 61 (19 @ 10:48) (59 - 62)  BP: 115/56 (19 @ 10:48) (110/60 - 167/62)  RR: 18 (19 @ 10:48) (12 - 20)  SpO2: 95% (19 @ 10:48) (91% - 100%)  Wt(kg): --  Daily     Daily Weight in k.4 (06 May 2019 06:28)    I&O's Summary    05 May 2019 07:01  -  06 May 2019 07:00  --------------------------------------------------------  IN: 0 mL / OUT: 1770 mL / NET: -1770 mL    PHYSICAL EXAM:  GENERAL: NAD, well-developed, comfortable  HEAD:  Atraumatic, Normocephalic  EYES: EOMI, PERRLA, conjunctiva and sclera clear  NECK: Supple, No JVD  CHEST/LUNG: diffuse  crackles b/l  HEART: Regular rate and rhythm; No murmurs, rubs, or gallops  ABDOMEN: Soft, Nontender, Nondistended; Bowel sounds present  EXTREMITIES:  1-2+ pitting edema b/l   PSYCH: AAOx3  NEUROLOGY: non-focal    LABS:                        11.2   4.53  )-----------( 186      ( 06 May 2019 05:58 )             34.8     06 May 2019 05:58    141    |  105    |  20     ----------------------------<  80     3.7     |  32     |  1.20     Ca    8.8        06 May 2019 05:58  Mg     2.2       05 May 2019 18:28    TPro  7.0    /  Alb  3.3    /  TBili  0.8    /  DBili  x      /  AST  22     /  ALT  22     /  AlkPhos  96     05 May 2019 18:28    PT/INR - ( 06 May 2019 05:58 )   PT: 25.9 sec;   INR: 2.27 ratio         PTT - ( 06 May 2019 05:58 )  PTT:39.1 sec    CARDIAC MARKERS ( 05 May 2019 20:41 )  <0.015 ng/mL / x     / x     / x     / x      CARDIAC MARKERS ( 05 May 2019 18:28 )  <0.015 ng/mL / x     / x     / x     / x        Serum Pro-Brain Natriuretic Peptide (19 @ 18:28)    Serum Pro-Brain Natriuretic Peptide: 1430 pg/mL 89 yo male with h/o CAD (with stents), reported h/o Afib, has pacemaker, on coumadin, HTN, CVA (8-10 yrs ago), BPH, depression, presenting with c/o chest discomfort, SOB, nausea and loss of appetite. Per pt, his sxs started with feeling of pressure in the upper abdomen and midsternal region 3 days ago. He has been unable to eat as a result of the pressure he feels in the sternal region, also has been feeling nauseous and with weakness. Pt states that it is the nausea, abdominal pressure, loss of appetite and feeling depressed that brought him to the ED, but all the symptoms are presently resolved. He denies any aggravating or alleviating factors, denies actual chest pain and/or radiation of pain to the neck or arm. Per ED note, pt also said to have SOB. Pt however denied SOB at home when asked. He reports sleeping supine at night without any difficulty breathing. In addition to this, pt also has b/l LE swelling without pain, which he reports has been present for a long time. Pt reports no previous history/diagnosis of CHF, and has never had to take lasix. Pt denied any chest pain/pressure, abdominal pain, n/v, cough, SOB at the time examined in the ED by me. No fever or chills at home.  I spoke over the phone with pt's daughter (Ana Cristina Berger) who states that pt did complain of chest pressure and pressure in the upper abdomen today. For the past 3 days has been complaining of pressure in the abdomen, feeling weak, with loss of appetite. The complain of chest pressure, as per daughter, was mainly on day of ED presentation. Per daughter, swelling in b/l LE has also been present for years (4-5 years) but appeared more pronounced on day of ED presentation.    In the ED, pt received ASA 325mg x1, CXR was obtained showed R lung pleural effusion (official read pending), BNP of 1430, trops neg x2. Pt was given a dose of IV lasix 40mg. LE doppler US was ordered.      Interval History: Patient evaluated at bedside. No overnight events. Endorses SOB. Denies chest pain, orthopnea, PND, cough, fever, chills.     REVIEW OF SYSTEMS:    CONSTITUTIONAL: No fevers or chills  EYES/ENT: No visual changes;  No vertigo or throat pain   NECK: No pain or stiffness  RESPIRATORY: No cough, wheezing, hemoptysis; +shortness of breath  CARDIOVASCULAR: No chest pain or palpitations  GASTROINTESTINAL: No abdominal or epigastric pain. No nausea, vomiting, or hematemesis; No diarrhea or constipation. No melena or hematochezia.  GENITOURINARY: No dysuria, frequency or hematuria  NEUROLOGICAL: No numbness or weakness  SKIN: No itching, burning, rashes, or lesions   All other review of systems is negative unless indicated above.    MEDICATIONS  (STANDING):  atorvastatin 40 milliGRAM(s) Oral at bedtime  famotidine    Tablet 20 milliGRAM(s) Oral daily  finasteride 5 milliGRAM(s) Oral daily  furosemide   Injectable 20 milliGRAM(s) IV Push every 12 hours  metoprolol succinate ER 25 milliGRAM(s) Oral daily  sertraline 25 milliGRAM(s) Oral daily  tamsulosin 0.4 milliGRAM(s) Oral at bedtime  timolol 0.5% Solution 1 Drop(s) Both EYES daily  warfarin 2 milliGRAM(s) Oral once    MEDICATIONS  (PRN):      Vital Signs (24 Hrs):  T(C): 36.6 (19 @ 10:48), Max: 36.6 (19 @ 17:30)  HR: 61 (19 @ 10:48) (59 - 62)  BP: 115/56 (19 @ 10:48) (110/60 - 167/62)  RR: 18 (19 @ 10:48) (12 - 20)  SpO2: 95% (19 @ 10:48) (91% - 100%)  Wt(kg): --  Daily     Daily Weight in k.4 (06 May 2019 06:28)    I&O's Summary    05 May 2019 07:01  -  06 May 2019 07:00  --------------------------------------------------------  IN: 0 mL / OUT: 1770 mL / NET: -1770 mL    PHYSICAL EXAM:  GENERAL: NAD, well-developed, comfortable  HEAD:  Atraumatic, Normocephalic  EYES: EOMI, PERRLA, conjunctiva and sclera clear  NECK: Supple, No JVD  CHEST/LUNG: diffuse  crackles b/l  HEART: Regular rate and rhythm; No murmurs, rubs, or gallops  ABDOMEN: Soft, Nontender, Nondistended; Bowel sounds present  EXTREMITIES:  1-2+ pitting edema b/l   PSYCH: AAOx3  NEUROLOGY: non-focal    LABS:                        11.2   4.53  )-----------( 186      ( 06 May 2019 05:58 )             34.8     06 May 2019 05:58    141    |  105    |  20     ----------------------------<  80     3.7     |  32     |  1.20     Ca    8.8        06 May 2019 05:58  Mg     2.2       05 May 2019 18:28    TPro  7.0    /  Alb  3.3    /  TBili  0.8    /  DBili  x      /  AST  22     /  ALT  22     /  AlkPhos  96     05 May 2019 18:28    PT/INR - ( 06 May 2019 05:58 )   PT: 25.9 sec;   INR: 2.27 ratio         PTT - ( 06 May 2019 05:58 )  PTT:39.1 sec    CARDIAC MARKERS ( 05 May 2019 20:41 )  <0.015 ng/mL / x     / x     / x     / x      CARDIAC MARKERS ( 05 May 2019 18:28 )  <0.015 ng/mL / x     / x     / x     / x        Serum Pro-Brain Natriuretic Peptide (19 @ 18:28)    Serum Pro-Brain Natriuretic Peptide: 1430 pg/mL    < from: Xray Chest 1 View AP/PA. (19 @ 19:09) >  Impression:    Probable small right base effusion.      < end of copied text >      < from: US Duplex Venous Lower Ext Complete, Bilateral (19 @ 22:33) >    IMPRESSION:     No evidence of bilateral lower extremity deep venous thrombosis.    < end of copied text >

## 2019-05-07 LAB
ANION GAP SERPL CALC-SCNC: 5 MMOL/L — SIGNIFICANT CHANGE UP (ref 5–17)
BUN SERPL-MCNC: 27 MG/DL — HIGH (ref 7–23)
CALCIUM SERPL-MCNC: 8.3 MG/DL — LOW (ref 8.5–10.1)
CHLORIDE SERPL-SCNC: 105 MMOL/L — SIGNIFICANT CHANGE UP (ref 96–108)
CO2 SERPL-SCNC: 32 MMOL/L — HIGH (ref 22–31)
CREAT SERPL-MCNC: 1.29 MG/DL — SIGNIFICANT CHANGE UP (ref 0.5–1.3)
GLUCOSE SERPL-MCNC: 83 MG/DL — SIGNIFICANT CHANGE UP (ref 70–99)
HCT VFR BLD CALC: 33.5 % — LOW (ref 39–50)
HGB BLD-MCNC: 10.7 G/DL — LOW (ref 13–17)
INR BLD: 2.18 RATIO — HIGH (ref 0.88–1.16)
MCHC RBC-ENTMCNC: 31.4 PG — SIGNIFICANT CHANGE UP (ref 27–34)
MCHC RBC-ENTMCNC: 31.9 GM/DL — LOW (ref 32–36)
MCV RBC AUTO: 98.2 FL — SIGNIFICANT CHANGE UP (ref 80–100)
NRBC # BLD: 0 /100 WBCS — SIGNIFICANT CHANGE UP (ref 0–0)
PLATELET # BLD AUTO: 175 K/UL — SIGNIFICANT CHANGE UP (ref 150–400)
POTASSIUM SERPL-MCNC: 3.4 MMOL/L — LOW (ref 3.5–5.3)
POTASSIUM SERPL-SCNC: 3.4 MMOL/L — LOW (ref 3.5–5.3)
PROTHROM AB SERPL-ACNC: 24.8 SEC — HIGH (ref 10–12.9)
RBC # BLD: 3.41 M/UL — LOW (ref 4.2–5.8)
RBC # FLD: 14.5 % — SIGNIFICANT CHANGE UP (ref 10.3–14.5)
SODIUM SERPL-SCNC: 142 MMOL/L — SIGNIFICANT CHANGE UP (ref 135–145)
WBC # BLD: 5.3 K/UL — SIGNIFICANT CHANGE UP (ref 3.8–10.5)
WBC # FLD AUTO: 5.3 K/UL — SIGNIFICANT CHANGE UP (ref 3.8–10.5)

## 2019-05-07 RX ORDER — POTASSIUM CHLORIDE 20 MEQ
40 PACKET (EA) ORAL ONCE
Qty: 0 | Refills: 0 | Status: COMPLETED | OUTPATIENT
Start: 2019-05-07 | End: 2019-05-07

## 2019-05-07 RX ORDER — WARFARIN SODIUM 2.5 MG/1
2 TABLET ORAL ONCE
Qty: 0 | Refills: 0 | Status: COMPLETED | OUTPATIENT
Start: 2019-05-07 | End: 2019-05-07

## 2019-05-07 RX ORDER — FUROSEMIDE 40 MG
40 TABLET ORAL
Qty: 0 | Refills: 0 | Status: DISCONTINUED | OUTPATIENT
Start: 2019-05-07 | End: 2019-05-08

## 2019-05-07 RX ADMIN — WARFARIN SODIUM 2 MILLIGRAM(S): 2.5 TABLET ORAL at 22:13

## 2019-05-07 RX ADMIN — FAMOTIDINE 20 MILLIGRAM(S): 10 INJECTION INTRAVENOUS at 11:29

## 2019-05-07 RX ADMIN — TAMSULOSIN HYDROCHLORIDE 0.4 MILLIGRAM(S): 0.4 CAPSULE ORAL at 22:13

## 2019-05-07 RX ADMIN — Medication 20 MILLIGRAM(S): at 06:21

## 2019-05-07 RX ADMIN — SERTRALINE 25 MILLIGRAM(S): 25 TABLET, FILM COATED ORAL at 11:29

## 2019-05-07 RX ADMIN — ATORVASTATIN CALCIUM 40 MILLIGRAM(S): 80 TABLET, FILM COATED ORAL at 22:13

## 2019-05-07 RX ADMIN — Medication 40 MILLIGRAM(S): at 11:29

## 2019-05-07 RX ADMIN — Medication 25 MILLIGRAM(S): at 06:21

## 2019-05-07 RX ADMIN — Medication 1 DROP(S): at 11:31

## 2019-05-07 RX ADMIN — FINASTERIDE 5 MILLIGRAM(S): 5 TABLET, FILM COATED ORAL at 11:29

## 2019-05-07 RX ADMIN — Medication 40 MILLIEQUIVALENT(S): at 11:29

## 2019-05-07 RX ADMIN — Medication 40 MILLIGRAM(S): at 20:18

## 2019-05-07 NOTE — CHART NOTE - NSCHARTNOTEFT_GEN_A_CORE
Pt seen and examined with house staff.  Plan formulated and reviewed on rounds    Briefly, 89 y/o male with PPM--AFIb on coumadin and HL admitted with CP found to have evidence of decompensated HF.  CXR--PVC.  BNP 1400.  LE edema    TTE--EF 65% with Pulm HTN    CP has resolved  No events overnight   ROS o/w negative     Stable vitals.  Warm  Awake and alert  NAD  LE 2+ Edema    INR 2.2  WBC 5  Trop Negative x 2    Acute on chronic Decompensated HFpEF  Agree with Increasing furosemide to 40 IV q12  Daily wts  DASH diet  Daily INR--dose coumadin according    Tele monitoring

## 2019-05-07 NOTE — PROGRESS NOTE ADULT - ASSESSMENT
1. SOB and chest pain- currently asymptomatic.  Cardiac enzymes did not reveal any ischemia.  EKG uninterpretable for ischemia.   Doubt angina.  Could be secondary to volume overload.  Will continue diuresis with iv lasix.  Can increase lasix to 40mg iv daily.  Diuresis with close monitoring of the renal function and electrolytes.  Goal potassium of 4 and magnesium of 2.   Strict I/O and daily wt checks. Low sodium diet. Nutrition education.     2. s/p pPM- interrogation requested.    3. CAD s/p PCI- off ASA as outpt.  Continue statin and rest of current medical regimen.    4. Atrial fibrillaiton- on full dose anticoagulation.    Other medical issues- Management per primary team.   Thank you for allowing me to participate in the care of this patient. Please feel free to contact me with any questions. 1. SOB and chest pain- currently asymptomatic.  Cardiac enzymes did not reveal any ischemia.  EKG uninterpretable for ischemia.   Doubt angina.  Could be secondary to volume overload.- improving hypervolemia  Will continue diuresis with iv lasix.  Diuresis with close monitoring of the renal function and electrolytes.  Goal potassium of 4 and magnesium of 2.   Strict I/O and daily wt checks. Low sodium diet. Nutrition education.     2. s/p pPM- interrogation requested.    3. CAD s/p PCI- off ASA as outpt.  Continue statin and rest of current medical regimen.    4. Atrial fibrillaiton- on full dose anticoagulation.    Other medical issues- Management per primary team.   Thank you for allowing me to participate in the care of this patient. Please feel free to contact me with any questions.

## 2019-05-07 NOTE — PROGRESS NOTE ADULT - SUBJECTIVE AND OBJECTIVE BOX
89 yo male with h/o CAD (with stents), reported h/o Afib, has pacemaker, on coumadin, HTN, CVA (8-10 yrs ago), BPH, depression, presenting with c/o chest discomfort, SOB, nausea and loss of appetite. Per pt, his sxs started with feeling of pressure in the upper abdomen and midsternal region 3 days ago. He has been unable to eat as a result of the pressure he feels in the sternal region, also has been feeling nauseous and with weakness. Pt states that it is the nausea, abdominal pressure, loss of appetite and feeling depressed that brought him to the ED, but all the symptoms are presently resolved. He denies any aggravating or alleviating factors, denies actual chest pain and/or radiation of pain to the neck or arm. Per ED note, pt also said to have SOB. Pt however denied SOB at home when asked. He reports sleeping supine at night without any difficulty breathing. In addition to this, pt also has b/l LE swelling without pain, which he reports has been present for a long time. Pt reports no previous history/diagnosis of CHF, and has never had to take lasix. Pt denied any chest pain/pressure, abdominal pain, n/v, cough, SOB at the time examined in the ED by me. No fever or chills at home.  I spoke over the phone with pt's daughter (Ana Cristina Berger) who states that pt did complain of chest pressure and pressure in the upper abdomen today. For the past 3 days has been complaining of pressure in the abdomen, feeling weak, with loss of appetite. The complain of chest pressure, as per daughter, was mainly on day of ED presentation. Per daughter, swelling in b/l LE has also been present for years (4-5 years) but appeared more pronounced on day of ED presentation.    In the ED, pt received ASA 325mg x1, CXR was obtained showed R lung pleural effusion (official read pending), BNP of 1430, trops neg x2. Pt was given a dose of IV lasix 40mg. LE doppler US was ordered.      Interval History: Patient evaluated at bedside. No overnight events. States that he feels better. Denies chest pain, SOB.      REVIEW OF SYSTEMS:    CONSTITUTIONAL: No fevers or chills  EYES/ENT: No visual changes;  No vertigo or throat pain   NECK: No pain or stiffness  RESPIRATORY: No cough, wheezing, hemoptysis; +shortness of breath  CARDIOVASCULAR: No chest pain or palpitations  GASTROINTESTINAL: No abdominal or epigastric pain. No nausea, vomiting, or hematemesis; No diarrhea or constipation. No melena or hematochezia.  GENITOURINARY: No dysuria, frequency or hematuria  NEUROLOGICAL: No numbness or weakness  SKIN: No itching, burning, rashes, or lesions   All other review of systems is negative unless indicated above.    MEDICATIONS  (STANDING):  atorvastatin 40 milliGRAM(s) Oral at bedtime  famotidine    Tablet 20 milliGRAM(s) Oral daily  finasteride 5 milliGRAM(s) Oral daily  furosemide   Injectable 40 milliGRAM(s) IV Push two times a day  metoprolol succinate ER 25 milliGRAM(s) Oral daily  sertraline 25 milliGRAM(s) Oral daily  tamsulosin 0.4 milliGRAM(s) Oral at bedtime  timolol 0.5% Solution 1 Drop(s) Both EYES daily    MEDICATIONS  (PRN):  melatonin 5 milliGRAM(s) Oral at bedtime PRN Sleep      Vital Signs (24 Hrs):  T(C): 36.3 (19 @ 10:58), Max: 36.5 (19 @ 04:14)  HR: 59 (19 @ 10:58) (59 - 60)  BP: 100/46 (19 @ 10:58) (100/46 - 147/54)  RR: 18 (19 @ 10:58) (18 - 18)  SpO2: 98% (19 @ 10:58) (92% - 98%)  Wt(kg): --  Daily     Daily Weight in k.6 (07 May 2019 05:17)    I&O's Summary    06 May 2019 07:01  -  07 May 2019 07:00  --------------------------------------------------------  IN: 0 mL / OUT: 800 mL / NET: -800 mL      PHYSICAL EXAM:  GENERAL: NAD, well-developed, comfortable  HEAD:  Atraumatic, Normocephalic  EYES: EOMI, PERRLA, conjunctiva and sclera clear  NECK: Supple, No JVD  CHEST/LUNG: diffuse crackles b/l improving   HEART: Regular rate and rhythm; No murmurs, rubs, or gallops  ABDOMEN: Soft, Nontender, Nondistended; Bowel sounds present  EXTREMITIES:  1-2+ pitting edema b/l   PSYCH: AAOx3  NEUROLOGY: non-focal    LABS:                        10.7   5.30  )-----------( 175      ( 07 May 2019 05:49 )             33.5     07 May 2019 05:49    142    |  105    |  27     ----------------------------<  83     3.4     |  32     |  1.29     Ca    8.3        07 May 2019 05:49      PT/INR - ( 07 May 2019 05:49 )   PT: 24.8 sec;   INR: 2.18 ratio         PTT - ( 06 May 2019 05:58 )  PTT:39.1 sec  CARDIAC MARKERS ( 05 May 2019 20:41 )  <0.015 ng/mL / x     / x     / x     / x      CARDIAC MARKERS ( 05 May 2019 18:28 )  <0.015 ng/mL / x     / x     / x     / x        Serum Pro-Brain Natriuretic Peptide (19 @ 18:28)    Serum Pro-Brain Natriuretic Peptide: 1430 pg/mL    < from: Xray Chest 1 View AP/PA. (19 @ 19:09) >  Impression:    Probable small right base effusion.      < end of copied text >      < from: US Duplex Venous Lower Ext Complete, Bilateral (19 @ 22:33) >    IMPRESSION:     No evidence of bilateral lower extremity deep venous thrombosis.    < end of copied text >      < from: Transthoracic Echocardiogram (19 @ 10:23) >   Impression     Summary     Mild to Moderate Tricuspid regurgitation is present.   Moderate pulmonary hypertension.   The left atrium is mildly dilated.   Mild concentric left ventricular hypertrophy is present.   Estimated left ventricular ejection fraction is 65%.   The right atrium appears mildly dilated.   A device wire is seen in the RV and RA.   The right ventricle is at the upper limits of normal in size, normal   function.     Signature    < end of copied text >:

## 2019-05-07 NOTE — PROGRESS NOTE ADULT - SUBJECTIVE AND OBJECTIVE BOX
Patient is a 89y old  Male who presents with a chief complaint of SOB and chest discomfort.     HPI:  89 yo male with h/o CAD (with stents), reported h/o Afib, has pacemaker, on coumadin, HTN, CVA (8-10 yrs ago), BPH, depression, presenting with c/o chest discomfort, SOB, nausea and loss of appetite. Pt this am was unable to give more history but states that he was feeling depressed as his legs were swollen.  He denies any SOB or CP this am.      In the ED, pt received ASA 325mg x1, CXR was obtained showed R lung pleural effusion (official read pending), BNP of 1430, trops neg x2. Pt was given a dose of IV lasix 40mg. LE doppler US was ordered.     - pt seen and examined by me today. Pt denies any CP or SOB.      PAST MEDICAL & SURGICAL HISTORY:  CVA (cerebrovascular accident)  BPH (benign prostatic hyperplasia)  HTN (hypertension)  Coronary artery disease  Pacemaker  History of appendectomy      MEDICATIONS  (STANDING):  atorvastatin 40 milliGRAM(s) Oral at bedtime  famotidine Injectable 20 milliGRAM(s) IV Push every 24 hours  finasteride 5 milliGRAM(s) Oral daily  furosemide   Injectable 20 milliGRAM(s) IV Push daily  metoprolol succinate ER 25 milliGRAM(s) Oral daily  sertraline 25 milliGRAM(s) Oral daily  tamsulosin 0.4 milliGRAM(s) Oral at bedtime  timolol 0.5% Solution 1 Drop(s) Both EYES daily    MEDICATIONS  (PRN):      FAMILY HISTORY:  unable to obtain from pt       SOCIAL HISTORY:  no recent smoking     REVIEW OF SYSTEMS:  CONSTITUTIONAL:    No fatigue, malaise, lethargy.  No fever or chills.  HEENT:  Eyes:  No visual changes.     ENT:  No epistaxis.  No sinus pain.    RESPIRATORY:  No cough.  No wheeze.  No hemoptysis.  no shortness of breath.  CARDIOVASCULAR:  c/o chest pains.  No palpitations. no shortness of breath, No orthopnea or PND.  GASTROINTESTINAL:  No abdominal pain.  No nausea or vomiting.    GENITOURINARY:    No hematuria.    MUSCULOSKELETAL:  No musculoskeletal pain.  No joint swelling.  No arthritis.  NEUROLOGICAL:  No tingling or numbness or weakness.  PSYCHIATRIC:  c/o depression      ICU Vital Signs Last 24 Hrs  T(C): 36.3 (07 May 2019 05:17), Max: 36.6 (06 May 2019 10:48)  T(F): 97.4 (07 May 2019 05:17), Max: 97.9 (06 May 2019 10:48)  HR: 59 (07 May 2019 05:17) (59 - 61)  BP: 130/51 (07 May 2019 05:17) (115/56 - 147/54)  BP(mean): --  ABP: --  ABP(mean): --  RR: 18 (07 May 2019 05:17) (18 - 18)  SpO2: 92% (07 May 2019 05:17) (92% - 95%)      PHYSICAL EXAM-    Constitutional: elderly frail and in no acute distress    Head: Head is normocephalic and atraumatic.      Neck:  No JVD.     Cardiovascular: Regular rate and rhythm without S3, S4. No murmurs or rubs are appreciated.      Respiratory: scattered rales b/l     Abdomen: Soft, nontender, nondistended with positive bowel sounds.      Extremity: No tenderness. 1+ pedal edema b/l   Neurologic: The patient is alert     Skin: No rash, no obvious lesions noted.      Psychiatric: The patient appears to be emotionally stable.      INTERPRETATION OF TELEMETRY:   SR   ECG: Sinus rythm , V paced.    I&O's Detail    05 May 2019 07:01  -  06 May 2019 07:00  --------------------------------------------------------  IN:  Total IN: 0 mL    OUT:    Voided: 1770 mL  Total OUT: 1770 mL    Total NET: -1770 mL          LABS:                        11.2   4.53  )-----------( 186      ( 06 May 2019 05:58 )             34.8     05    141  |  105  |  20  ----------------------------<  80  3.7   |  32<H>  |  1.20    Ca    8.8      06 May 2019 05:58  Mg     2.2     05-05    TPro  7.0  /  Alb  3.3  /  TBili  0.8  /  DBili  x   /  AST  22  /  ALT  22  /  AlkPhos  96      CARDIAC MARKERS ( 05 May 2019 20:41 )  <0.015 ng/mL / x     / x     / x     / x      CARDIAC MARKERS ( 05 May 2019 18:28 )  <0.015 ng/mL / x     / x     / x     / x          PT/INR - ( 06 May 2019 05:58 )   PT: 25.9 sec;   INR: 2.27 ratio         PTT - ( 06 May 2019 05:58 )  PTT:39.1 sec  Urinalysis Basic - ( 05 May 2019 20:23 )    Color: Yellow / Appearance: Clear / S.010 / pH: x  Gluc: x / Ketone: Negative  / Bili: Negative / Urobili: 1 mg/dL   Blood: x / Protein: Negative mg/dL / Nitrite: Negative   Leuk Esterase: Negative / RBC: 0-2 /HPF / WBC Negative   Sq Epi: x / Non Sq Epi: Negative / Bacteria: Negative      I&O's Summary    05 May 2019 07:01  -  06 May 2019 07:00  --------------------------------------------------------  IN: 0 mL / OUT: 1770 mL / NET: -1770 mL      BNPSerum Pro-Brain Natriuretic Peptide: 1430 pg/mL ( @ 18:28)    RADIOLOGY & ADDITIONAL STUDIES:

## 2019-05-07 NOTE — PROGRESS NOTE ADULT - ASSESSMENT
89 yo male with h/o CAD (with stents), reported h/o Afib, has pacemaker, on coumadin, HTN, CVA (8-10 yrs ago), BPH, depression, presenting with c/o chest discomfort, pressure in abdomen, nausea and loss of appetite, pt also with b/l LE edema and elevated BNP, CXR with R pleural effusion, concerning for likely acute CHF.    #Acute decompensated HFpEF  - BNP of 1430 on admission  - will monitor on telemetry  - CXR - probable R pleural effusion   - will start lasix 40 mg IV BID  - monitor electrolytes  - daily weights  - low salt diet  - 1.5 L daily fluid restriction  - strict I/Os  - LE doppler done for b/l LE edema - negative for DVT  - ECHO as above: noted for TR; EF 65%; pHTN  - Cardiology consult appreciated     #Chest discomfort  - likely 2/2 to GERD  - EKG - ventricular paced, no obvious ST-T wave changes  - Trop neg x2  - s/p ASA 325mg in the ED  - continue Pepcid  - cardiology consult appreciated    #CAD with stents  - not on aspirin due to hx of gastritis  - continue statin    #Afib  - CHADSVASc score 6  - rate controlled  - continue coumadin  - continue metoprolol   - INR therapeutic   - monitor INR daily; goal 2-3  - pacemaker interrogation revealed afib w/ rate of 60bpm    #Anemia  - normocytic  - no signs of active bleeding  - will monitor    #BPH  - continue tamsulosin   - continue finasteride    #Depression  - continue zoloft    #DVT ppx  - on coumadin

## 2019-05-07 NOTE — CDI QUERY NOTE - NSCDIOTHERTXTBX_GEN_ALL_CORE_HH
Documentation on chart of  Acute on chronic Decompensated HFpEF.    RR: 18 (07 May 2019 10:58) (12 - 20)    SpO2: 98% (07 May 2019 10:58) (91% - 100%)    Shortness of breathe on admission    +crackles on R>L    Please consider PF ratio      Please clarify a diagnosis based on the above clinical findings.  A) Acute Hypoxic Respiratory Failure  B) Acute Respiratory Distress Syndrome  C) Other (specify)  D) Unknown

## 2019-05-08 ENCOUNTER — TRANSCRIPTION ENCOUNTER (OUTPATIENT)
Age: 84
End: 2019-05-08

## 2019-05-08 VITALS
RESPIRATION RATE: 17 BRPM | OXYGEN SATURATION: 95 % | TEMPERATURE: 97 F | SYSTOLIC BLOOD PRESSURE: 100 MMHG | HEART RATE: 59 BPM | DIASTOLIC BLOOD PRESSURE: 51 MMHG

## 2019-05-08 LAB
ANION GAP SERPL CALC-SCNC: 5 MMOL/L — SIGNIFICANT CHANGE UP (ref 5–17)
BUN SERPL-MCNC: 35 MG/DL — HIGH (ref 7–23)
CALCIUM SERPL-MCNC: 8.5 MG/DL — SIGNIFICANT CHANGE UP (ref 8.5–10.1)
CHLORIDE SERPL-SCNC: 104 MMOL/L — SIGNIFICANT CHANGE UP (ref 96–108)
CO2 SERPL-SCNC: 31 MMOL/L — SIGNIFICANT CHANGE UP (ref 22–31)
CREAT SERPL-MCNC: 1.5 MG/DL — HIGH (ref 0.5–1.3)
GLUCOSE SERPL-MCNC: 86 MG/DL — SIGNIFICANT CHANGE UP (ref 70–99)
HCT VFR BLD CALC: 34.4 % — LOW (ref 39–50)
HGB BLD-MCNC: 11.4 G/DL — LOW (ref 13–17)
INR BLD: 2.41 RATIO — HIGH (ref 0.88–1.16)
MAGNESIUM SERPL-MCNC: 2.1 MG/DL — SIGNIFICANT CHANGE UP (ref 1.6–2.6)
MCHC RBC-ENTMCNC: 31.8 PG — SIGNIFICANT CHANGE UP (ref 27–34)
MCHC RBC-ENTMCNC: 33.1 GM/DL — SIGNIFICANT CHANGE UP (ref 32–36)
MCV RBC AUTO: 96.1 FL — SIGNIFICANT CHANGE UP (ref 80–100)
NRBC # BLD: 0 /100 WBCS — SIGNIFICANT CHANGE UP (ref 0–0)
PLATELET # BLD AUTO: 190 K/UL — SIGNIFICANT CHANGE UP (ref 150–400)
POTASSIUM SERPL-MCNC: 3.6 MMOL/L — SIGNIFICANT CHANGE UP (ref 3.5–5.3)
POTASSIUM SERPL-SCNC: 3.6 MMOL/L — SIGNIFICANT CHANGE UP (ref 3.5–5.3)
PROTHROM AB SERPL-ACNC: 27.5 SEC — HIGH (ref 10–12.9)
RBC # BLD: 3.58 M/UL — LOW (ref 4.2–5.8)
RBC # FLD: 14.6 % — HIGH (ref 10.3–14.5)
SODIUM SERPL-SCNC: 140 MMOL/L — SIGNIFICANT CHANGE UP (ref 135–145)
WBC # BLD: 5.19 K/UL — SIGNIFICANT CHANGE UP (ref 3.8–10.5)
WBC # FLD AUTO: 5.19 K/UL — SIGNIFICANT CHANGE UP (ref 3.8–10.5)

## 2019-05-08 RX ORDER — FUROSEMIDE 40 MG
1 TABLET ORAL
Qty: 14 | Refills: 0
Start: 2019-05-08 | End: 2019-05-21

## 2019-05-08 RX ORDER — ATORVASTATIN CALCIUM 80 MG/1
1 TABLET, FILM COATED ORAL
Qty: 7 | Refills: 0
Start: 2019-05-08 | End: 2019-05-14

## 2019-05-08 RX ORDER — FUROSEMIDE 40 MG
40 TABLET ORAL EVERY 12 HOURS
Qty: 0 | Refills: 0 | Status: DISCONTINUED | OUTPATIENT
Start: 2019-05-08 | End: 2019-05-08

## 2019-05-08 RX ORDER — ATORVASTATIN CALCIUM 80 MG/1
1 TABLET, FILM COATED ORAL
Qty: 0 | Refills: 0 | DISCHARGE
Start: 2019-05-08 | End: 2019-05-14

## 2019-05-08 RX ORDER — POTASSIUM CHLORIDE 20 MEQ
20 PACKET (EA) ORAL ONCE
Qty: 0 | Refills: 0 | Status: DISCONTINUED | OUTPATIENT
Start: 2019-05-08 | End: 2019-05-08

## 2019-05-08 RX ORDER — FUROSEMIDE 40 MG
20 TABLET ORAL EVERY 12 HOURS
Qty: 0 | Refills: 0 | Status: DISCONTINUED | OUTPATIENT
Start: 2019-05-08 | End: 2019-05-08

## 2019-05-08 RX ADMIN — Medication 25 MILLIGRAM(S): at 05:56

## 2019-05-08 RX ADMIN — FINASTERIDE 5 MILLIGRAM(S): 5 TABLET, FILM COATED ORAL at 12:34

## 2019-05-08 RX ADMIN — FAMOTIDINE 20 MILLIGRAM(S): 10 INJECTION INTRAVENOUS at 12:34

## 2019-05-08 RX ADMIN — Medication 1 DROP(S): at 12:35

## 2019-05-08 RX ADMIN — SERTRALINE 25 MILLIGRAM(S): 25 TABLET, FILM COATED ORAL at 12:34

## 2019-05-08 NOTE — DISCHARGE NOTE NURSING/CASE MANAGEMENT/SOCIAL WORK - NSDCDPATPORTLINK_GEN_ALL_CORE
You can access the [facility] Patient Portal, offered by [facility], by registering with the following website: [email or web address]

## 2019-05-08 NOTE — DISCHARGE NOTE PROVIDER - CARE PROVIDERS DIRECT ADDRESSES
,[email or web address]

## 2019-05-08 NOTE — DISCHARGE NOTE PROVIDER - NSDCCPTREATMENT_GEN_ALL_CORE_FT
PRINCIPAL PROCEDURE  Procedure: 2D echo  Findings and Treatment: Mild to Moderate Tricuspid regurgitation is present.   Moderate pulmonary hypertension.   The left atrium is mildly dilated.   Mild concentric left ventricular hypertrophy is present.   Estimated left ventricular ejection fraction is 65%.   The right atrium appears mildly dilated.   A device wire is seen in the RV and RA.   The right ventricle is at the upper limits of normal in size, normal   function.

## 2019-05-08 NOTE — PROGRESS NOTE ADULT - SUBJECTIVE AND OBJECTIVE BOX
89 yo male with h/o CAD (with stents), reported h/o Afib, has pacemaker, on coumadin, HTN, CVA (8-10 yrs ago), BPH, depression, presenting with c/o chest discomfort, SOB, nausea and loss of appetite. Per pt, his sxs started with feeling of pressure in the upper abdomen and midsternal region 3 days ago. He has been unable to eat as a result of the pressure he feels in the sternal region, also has been feeling nauseous and with weakness. Pt states that it is the nausea, abdominal pressure, loss of appetite and feeling depressed that brought him to the ED, but all the symptoms are presently resolved. He denies any aggravating or alleviating factors, denies actual chest pain and/or radiation of pain to the neck or arm. Per ED note, pt also said to have SOB. Pt however denied SOB at home when asked. He reports sleeping supine at night without any difficulty breathing. In addition to this, pt also has b/l LE swelling without pain, which he reports has been present for a long time. Pt reports no previous history/diagnosis of CHF, and has never had to take lasix. Pt denied any chest pain/pressure, abdominal pain, n/v, cough, SOB at the time examined in the ED by me. No fever or chills at home.  I spoke over the phone with pt's daughter (Ana Cristina Berger) who states that pt did complain of chest pressure and pressure in the upper abdomen today. For the past 3 days has been complaining of pressure in the abdomen, feeling weak, with loss of appetite. The complain of chest pressure, as per daughter, was mainly on day of ED presentation. Per daughter, swelling in b/l LE has also been present for years (4-5 years) but appeared more pronounced on day of ED presentation.    In the ED, pt received ASA 325mg x1, CXR was obtained showed R lung pleural effusion (official read pending), BNP of 1430, trops neg x2. Pt was given a dose of IV lasix 40mg. LE doppler US was ordered.      Interval History: Patient evaluated at bedside. No overnight events. States that he feels better. C/o occasional dizziness. Denies chest pain, SOB.      REVIEW OF SYSTEMS:    CONSTITUTIONAL: No fevers or chills  EYES/ENT: No visual changes;  No vertigo or throat pain   NECK: No pain or stiffness  RESPIRATORY: No cough, wheezing, hemoptysis; No SOB  CARDIOVASCULAR: No chest pain or palpitations  GASTROINTESTINAL: No abdominal or epigastric pain. No nausea, vomiting, or hematemesis; No diarrhea or constipation. No melena or hematochezia.  GENITOURINARY: No dysuria, frequency or hematuria  NEUROLOGICAL: No numbness or weakness  SKIN: No itching, burning, rashes, or lesions   All other review of systems is negative unless indicated above.    MEDICATIONS  (STANDING):  atorvastatin 40 milliGRAM(s) Oral at bedtime  famotidine    Tablet 20 milliGRAM(s) Oral daily  finasteride 5 milliGRAM(s) Oral daily  metoprolol succinate ER 25 milliGRAM(s) Oral daily  sertraline 25 milliGRAM(s) Oral daily  tamsulosin 0.4 milliGRAM(s) Oral at bedtime  timolol 0.5% Solution 1 Drop(s) Both EYES daily    MEDICATIONS  (PRN):  melatonin 5 milliGRAM(s) Oral at bedtime PRN Sleep    Vital Signs (24 Hrs):  T(C): 36.4 (19 @ 05:19), Max: 36.5 (19 @ 20:13)  HR: 59 (19 @ 05:19) (59 - 60)  BP: 113/56 (19 @ 05:19) (100/46 - 125/46)  RR: 18 (19 @ 05:19) (18 - 18)  SpO2: 90% (19 @ 05:19) (90% - 98%)  Wt(kg): --  Daily     Daily Weight in k.9 (08 May 2019 05:57)    I&O's Summary    07 May 2019 07:01  -  08 May 2019 07:00  --------------------------------------------------------  IN: 60 mL / OUT: 700 mL / NET: -640 mL      PHYSICAL EXAM:  GENERAL: NAD, well-developed, comfortable  HEAD:  Atraumatic, Normocephalic  EYES: EOMI, PERRLA, conjunctiva and sclera clear  NECK: Supple, No JVD  CHEST/LUNG: diffuse crackles b/l improving   HEART: Regular rate and rhythm; No murmurs, rubs, or gallops  ABDOMEN: Soft, Nontender, Nondistended; Bowel sounds present  EXTREMITIES:  1-2+ pitting edema b/l primarily at the ankles  PSYCH: AAOx3  NEUROLOGY: non-focal    LABS:                        11.4   5.19  )-----------( 190      ( 08 May 2019 06:02 )             34.4     08 May 2019 06:02    140    |  104    |  35     ----------------------------<  86     3.6     |  31     |  1.50     Ca    8.5        08 May 2019 06:02  Mg     2.1       08 May 2019 06:02      PT/INR - ( 08 May 2019 06:02 )   PT: 27.5 sec;   INR: 2.41 ratio         CARDIAC MARKERS ( 05 May 2019 20:41 )  <0.015 ng/mL / x     / x     / x     / x      CARDIAC MARKERS ( 05 May 2019 18:28 )  <0.015 ng/mL / x     / x     / x     / x        Serum Pro-Brain Natriuretic Peptide (19 @ 18:28)    Serum Pro-Brain Natriuretic Peptide: 1430 pg/mL    < from: Xray Chest 1 View AP/PA. (19 @ 19:09) >  Impression:    Probable small right base effusion.      < end of copied text >      < from: US Duplex Venous Lower Ext Complete, Bilateral (19 @ 22:33) >    IMPRESSION:     No evidence of bilateral lower extremity deep venous thrombosis.    < end of copied text >      < from: Transthoracic Echocardiogram (19 @ 10:23) >   Impression     Summary     Mild to Moderate Tricuspid regurgitation is present.   Moderate pulmonary hypertension.   The left atrium is mildly dilated.   Mild concentric left ventricular hypertrophy is present.   Estimated left ventricular ejection fraction is 65%.   The right atrium appears mildly dilated.   A device wire is seen in the RV and RA.   The right ventricle is at the upper limits of normal in size, normal   function.     Signature    < end of copied text >:

## 2019-05-08 NOTE — DISCHARGE NOTE PROVIDER - PROVIDER TOKENS
PROVIDER:[TOKEN:[[id number]]] PROVIDER:[TOKEN:[[id number]]],FREE:[LAST:[Emre],FIRST:[Jose Francisco],PHONE:[phone]],FAX:[(   )    -],ADDRESS:[[facility] [place] [id number]]]

## 2019-05-08 NOTE — DISCHARGE NOTE PROVIDER - CARE PROVIDER_API CALL
Sg Washington (MD)  Cardiovascular Disease; Nuclear Cardiology  [address] [place] [address]  Phone: [phone]  Fax: [phone]  Follow Up Time: Sg Washington)  Cardiovascular Disease; Nuclear Cardiology  [address] [place] [address]  Phone: [phone]  Fax: [phone]  Follow Up Time:     [address], [place] [address]  Phone: [phone]  Fax: (   )    -  Follow Up Time:

## 2019-05-08 NOTE — PROGRESS NOTE ADULT - SUBJECTIVE AND OBJECTIVE BOX
Pt has been seen and examined with FP resident, resident supervised agree with a/p       Patient is a 89y old  Male who presents with a chief complaint of shortness of breath         PHYSICAL EXAM:  Vital Signs Last 24 Hrs  T(C): 36.7 (08 May 2019 11:40), Max: 36.7 (08 May 2019 11:40)  T(F): 98 (08 May 2019 11:40), Max: 98 (08 May 2019 11:40)  HR: 60 (08 May 2019 11:40) (59 - 60)  BP: 106/42 (08 May 2019 11:40) (106/42 - 125/46)  BP(mean): --  RR: 18 (08 May 2019 11:40) (18 - 18)  SpO2: 95% (08 May 2019 11:40) (90% - 95%)  general- comfortable   -rs-aeeb,cta  -cvs-s1s2 normal   -p/a-soft,bs+  -extremity- pitting edema b/l mostly on foot  -cns- non focal         A/P    #pt is feeling better still has pitting edema on foot-mostly due to right heart failure, in view of his worsening kidney function he can not tolerate more lasix. D/c today on po lasix with further management as an outpt,     #He was short of breath due to acute decompensated diastolic  heart failure which has resolved now. He does not have acute hypoxic respirator failure.  Pt does not have acute respiratory distress syndrome     #Discussed with pt and all questions have been answered     #Discussed with Dr. Smith, time spent 60 minutes

## 2019-05-08 NOTE — DISCHARGE NOTE NURSING/CASE MANAGEMENT/SOCIAL WORK - NSDCPEPTSTRK_GEN_ALL_CORE
Call 911 for stroke/Need for follow up after discharge/Prescribed medications/Risk factors for stroke/Stroke education booklet/Stroke warning signs and symptoms/Signs and symptoms of stroke/Stroke support groups for patients, families, and friends

## 2019-05-08 NOTE — DISCHARGE NOTE NURSING/CASE MANAGEMENT/SOCIAL WORK - NSDCPEPTCOWAR_GEN_ALL_CORE
Coumadin/Warfarin - Follow up monitoring/Coumadin/Warfarin - Compliance/Coumadin/Warfarin - Dietary Advice/Coumadin/Warfarin - Potential for adverse drug reactions and interactions

## 2019-05-08 NOTE — DISCHARGE NOTE PROVIDER - HOSPITAL COURSE
89 yo male with h/o CAD (with stents), reported h/o Afib, has pacemaker, on coumadin, HTN, CVA (8-10 yrs ago), BPH, depression, presenting with c/o chest discomfort, SOB, nausea and loss of appetite. Patient admitted for CHF. On exam noted to have b/l crackles and b/l LE edema. Labs revealed a pBNP of 1430. CXR showed a right pleural effusion. ECHO was also done which revealed tricuspid regurgitation, pulmonary HTN and an ejection fraction of 65%. Patient was treated with IV lasix with good symptomatic improvement. Patient also evaluated by cardiolgist during his hospital course. On day of discharge patient is afebrile and hemodynamically stable. 89 yo male with h/o CAD (with stents), reported h/o Afib, has pacemaker, on coumadin, HTN, CVA (8-10 yrs ago), BPH, depression, presenting with c/o chest discomfort, SOB, nausea and loss of appetite. Patient admitted for CHF. On exam noted to have b/l crackles and b/l LE edema. Labs revealed a pBNP of 1430. CXR showed a right pleural effusion. ECHO was also done which revealed tricuspid regurgitation, pulmonary HTN and an ejection fraction of 65%. Patient was treated with IV lasix with good symptomatic improvement. Patient also evaluated by cardiolgist during his hospital course. On day of discharge patient is afebrile and hemodynamically stable. Dr. Jose Francisco Andrea of hospital course and discharge plan. 89 yo male with h/o CAD (with stents), reported h/o Afib, has pacemaker, on coumadin, HTN, CVA (8-10 yrs ago), BPH, depression, presenting with c/o chest discomfort, SOB, nausea and loss of appetite. Patient admitted for CHF. On exam noted to have b/l crackles and b/l LE edema. Labs revealed a pBNP of 1430. CXR showed a right pleural effusion. ECHO was also done which revealed tricuspid regurgitation, pulmonary HTN and an ejection fraction of 65%. Patient was treated with IV lasix with good symptomatic improvement. Patient also evaluated by cardiolgist during his hospital course. On day of discharge patient is afebrile and hemodynamically stable. PCP Dr. Jose Francisco Andrea is aware of hospital course and discharge plan.

## 2019-05-08 NOTE — PROGRESS NOTE ADULT - ASSESSMENT
A/P: 89 yo male with h/o CAD s/p PCI, reported h/o Afib, has pacemaker, on coumadin, HTN, CVA (8-10 yrs ago), BPH, depression, presenting with c/o chest discomfort, SOB, nausea and loss of appetite. Pt this am was unable to give more history but states that he was feeling depressed as his legs were swollen.    1. SOB and chest pain- currently asymptomatic. No new complaints.   Cardiac enzymes did not reveal any ischemia. No active CP.     2. Hypervolemia. Received IV lasix yesterday- Cr now increased to 1.5.   Would hold on additional lasix for now in this elderly pt.   Can consider starting low dose PO lasix when Cr improves.   Diuresis with close monitoring of the renal function and electrolytes.  Goal potassium of 4 and magnesium of 2.   Strict I/O and daily wt checks. Low sodium diet. Nutrition education.     3. S/p pPM- interrogation requested.    4. CAD s/p PCI- Cont conservative medical mgmt.     5. Atrial fibrillation- on full dose anticoagulation.    6. DVT proph, replete lytes as needed.

## 2019-05-08 NOTE — DISCHARGE NOTE NURSING/CASE MANAGEMENT/SOCIAL WORK - NSDCFUADDAPPT_GEN_ALL_CORE_FT
[facility]  Patient has an appointment with Dr. Loya 5/16 at 12:00 for CHF follow up.

## 2019-05-08 NOTE — DISCHARGE NOTE PROVIDER - NSDCCPCAREPLAN_GEN_ALL_CORE_FT
PRINCIPAL DISCHARGE DIAGNOSIS  Diagnosis: CHF (congestive heart failure)  Assessment and Plan of Treatment: - admitted for congestive heart failure  - now with symptomatic improvement after medications  - Prescription for lasix sent to your pharmacy. Please take as prescribed.   - monitor your weight daily  - maintain low salt diet  - maintain a daily 1.5 L daily fluid restriction  - LE doppler done for b/l LE edema - negative for DVT  - ECHO noted for Tricuspid Regurgatation; Ejection fraction of 65%; and pulmonary HTN  - follow up with our cardiologist in 2-3 days.        SECONDARY DISCHARGE DIAGNOSES  Diagnosis: Afib  Assessment and Plan of Treatment: - continue coumadin  - continue metoprolol   - follow up with outpatient providers to have INR checked within 2-3 days of discharge. Goal INR is 2-3.      Diagnosis: JACQUELIN (acute kidney injury)  Assessment and Plan of Treatment: - stay hydrated  - follow up with your primary care doctor to have renal function checked within 1 week of discharge    Diagnosis: Chest pain due to GERD  Assessment and Plan of Treatment: - continue pepcid  - follow up with your primary care provider PRINCIPAL DISCHARGE DIAGNOSIS  Diagnosis: CHF (congestive heart failure)  Assessment and Plan of Treatment: - admitted for congestive heart failure  - now with symptomatic improvement after medications  - Prescription for lasix sent to your pharmacy. Please take as prescribed.   - monitor your weight daily  - maintain low salt diet  - maintain a daily 1.5 L daily fluid restriction  - LE doppler done for b/l LE edema - negative for DVT  - ECHO noted for Tricuspid Regurgatation; Ejection fraction of 65%; and pulmonary HTN  - follow up with our cardiologist in 2-3 days.        SECONDARY DISCHARGE DIAGNOSES  Diagnosis: CAD (coronary artery disease)  Assessment and Plan of Treatment: - not on aspirin due to history of gastritis  - continue atorvastatin  - follow up with your outpatient providers for further management    Diagnosis: Afib  Assessment and Plan of Treatment: - continue coumadin  - continue metoprolol   - follow up with outpatient providers to have INR checked within 2-3 days of discharge. Goal INR is 2-3.      Diagnosis: JACQUELIN (acute kidney injury)  Assessment and Plan of Treatment: - stay hydrated  - follow up with your primary care doctor to have renal function checked within 1 week of discharge    Diagnosis: Chest pain due to GERD  Assessment and Plan of Treatment: - continue pepcid  - follow up with your primary care provider PRINCIPAL DISCHARGE DIAGNOSIS  Diagnosis: CHF (congestive heart failure)  Assessment and Plan of Treatment: - admitted for congestive heart failure  - now with symptomatic improvement after medications  - Prescription for lasix sent to your pharmacy. Please take as prescribed.   - monitor your weight daily  - maintain low salt diet  - maintain a daily 1.5 L daily fluid restriction  - LE doppler done for b/l LE edema - negative for DVT  - ECHO noted for Tricuspid Regurgatation; Ejection fraction of 65%; and pulmonary HTN  - follow up with our cardiologist in 2-3 days.        SECONDARY DISCHARGE DIAGNOSES  Diagnosis: CAD (coronary artery disease)  Assessment and Plan of Treatment: - not on aspirin due to history of gastritis  - continue atorvastatin  - follow up with your outpatient providers for further management    Diagnosis: Afib  Assessment and Plan of Treatment: - continue coumadin  - continue metoprolol   - follow up with outpatient providers to have INR checked within 2-3 days of discharge. Goal INR is 2-3.      Diagnosis: JACQUELIN (acute kidney injury)  Assessment and Plan of Treatment: - stay hydrated  - follow up with your primary care doctor to have renal function checked within 1 week of discharge    Diagnosis: Chest pain due to GERD  Assessment and Plan of Treatment: - continue zantac  - follow up with your primary care provider

## 2019-05-08 NOTE — PROGRESS NOTE ADULT - SUBJECTIVE AND OBJECTIVE BOX
Cardiology Progress Note    HPI: 89 yo male with h/o CAD s/p PCI, reported h/o Afib, has pacemaker, on coumadin, HTN, CVA (8-10 yrs ago), BPH, depression, presenting with c/o chest discomfort, SOB, nausea and loss of appetite. Pt this am was unable to give more history but states that he was feeling depressed as his legs were swollen.  He denies any SOB or CP this am. In the ED, pt received ASA 325mg x1, CXR was obtained showed R lung pleural effusion (official read pending), BNP of 1430, trops neg x2. Pt was given a dose of IV lasix 40mg. LE doppler US was ordered.     . No CP/SOB. SR on tele.       PAST MEDICAL & SURGICAL HISTORY:  CVA (cerebrovascular accident)  BPH (benign prostatic hyperplasia)  HTN (hypertension)  Coronary artery disease  Pacemaker  History of appendectomy    MEDICATIONS  (STANDING):  atorvastatin 40 milliGRAM(s) Oral at bedtime  famotidine Injectable 20 milliGRAM(s) IV Push every 24 hours  finasteride 5 milliGRAM(s) Oral daily  furosemide   Injectable 20 milliGRAM(s) IV Push daily  metoprolol succinate ER 25 milliGRAM(s) Oral daily  sertraline 25 milliGRAM(s) Oral daily  tamsulosin 0.4 milliGRAM(s) Oral at bedtime  timolol 0.5% Solution 1 Drop(s) Both EYES daily    MEDICATIONS  (PRN):    FAMILY HISTORY:  unable to obtain from pt     SOCIAL HISTORY:  no recent smoking     REVIEW OF SYSTEMS:  CONSTITUTIONAL:    No fatigue, malaise, lethargy.  No fever or chills.  HEENT:  Eyes:  No visual changes.     ENT:  No epistaxis.  No sinus pain.    RESPIRATORY:  No cough.  No wheeze.  No hemoptysis.  no shortness of breath.  CARDIOVASCULAR:  c/o chest pains.  No palpitations. no shortness of breath, No orthopnea or PND.  GASTROINTESTINAL:  No abdominal pain.  No nausea or vomiting.    GENITOURINARY:    No hematuria.    MUSCULOSKELETAL:  No musculoskeletal pain.  No joint swelling.  No arthritis.  NEUROLOGICAL:  No tingling or numbness or weakness.    ICU Vital Signs Last 24 Hrs  T(C): 36.3 (07 May 2019 05:17), Max: 36.6 (06 May 2019 10:48)  T(F): 97.4 (07 May 2019 05:17), Max: 97.9 (06 May 2019 10:48)  HR: 59 (07 May 2019 05:17) (59 - 61)  BP: 130/51 (07 May 2019 05:17) (115/56 - 147/54)  BP(mean): --  ABP: --  ABP(mean): --  RR: 18 (07 May 2019 05:17) (18 - 18)  SpO2: 92% (07 May 2019 05:17) (92% - 95%)      PHYSICAL EXAM-  Constitutional: elderly frail and in no acute distress  Head: Head is normocephalic and atraumatic.    Neck:  No JVD.   Cardiovascular: Regular rate and rhythm without S3, S4. No murmurs or rubs are appreciated.    Respiratory: scattered rales b/l   Abdomen: Soft, nontender, nondistended with positive bowel sounds.    Extremity: No tenderness. 1+ pedal edema b/l   Neurologic: The patient is alert     INTERPRETATION OF TELEMETRY:   SR   ECG: Sinus rythm , V paced.    I&O's Detail    05 May 2019 07:01  -  06 May 2019 07:00  --------------------------------------------------------  IN:  Total IN: 0 mL    OUT:    Voided: 1770 mL  Total OUT: 1770 mL    Total NET: -1770 mL    LABS:                        11.2   4.53  )-----------( 186      ( 06 May 2019 05:58 )             34.8         141  |  105  |  20  ----------------------------<  80  3.7   |  32<H>  |  1.20    Ca    8.8      06 May 2019 05:58  Mg     2.2     05-    TPro  7.0  /  Alb  3.3  /  TBili  0.8  /  DBili  x   /  AST  22  /  ALT  22  /  AlkPhos  96  05-05    CARDIAC MARKERS ( 05 May 2019 20:41 )  <0.015 ng/mL / x     / x     / x     / x      CARDIAC MARKERS ( 05 May 2019 18:28 )  <0.015 ng/mL / x     / x     / x     / x          PT/INR - ( 06 May 2019 05:58 )   PT: 25.9 sec;   INR: 2.27 ratio         PTT - ( 06 May 2019 05:58 )  PTT:39.1 sec  Urinalysis Basic - ( 05 May 2019 20:23 )    Color: Yellow / Appearance: Clear / S.010 / pH: x  Gluc: x / Ketone: Negative  / Bili: Negative / Urobili: 1 mg/dL   Blood: x / Protein: Negative mg/dL / Nitrite: Negative   Leuk Esterase: Negative / RBC: 0-2 /HPF / WBC Negative   Sq Epi: x / Non Sq Epi: Negative / Bacteria: Negative      I&O's Summary    05 May 2019 07:01  -  06 May 2019 07:00  --------------------------------------------------------  IN: 0 mL / OUT: 1770 mL / NET: -1770 mL      BNPSerum Pro-Brain Natriuretic Peptide: 1430 pg/mL ( @ 18:28)    RADIOLOGY & ADDITIONAL STUDIES:

## 2019-05-08 NOTE — PROGRESS NOTE ADULT - ASSESSMENT
89 yo male with h/o CAD (with stents), reported h/o Afib, has pacemaker, on coumadin, HTN, CVA (8-10 yrs ago), BPH, depression, presenting with c/o chest discomfort, pressure in abdomen, nausea and loss of appetite, pt also with b/l LE edema and elevated BNP, CXR with R pleural effusion, concerning for likely acute CHF.    #Acute decompensated HFpEF  - BNP of 1430 on admission  - will monitor on telemetry  - CXR - probable R pleural effusion   - s/p lasix 40 mg IV BID will hold for now due to new onset JACQUELIN  - monitor electrolytes  - daily weights  - low salt diet  - 1.5 L daily fluid restriction  - strict I/Os  - LE doppler done for b/l LE edema - negative for DVT  - ECHO as above: noted for TR; EF 65%; pHTN  - cardiology consult appreciated     #Chest discomfort  - likely 2/2 to GERD  - EKG - ventricular paced, no obvious ST-T wave changes  - Trop neg x2  - s/p ASA 325mg in the ED  - continue Pepcid    #JACQUELIN  - likely prerenal in the setting of CHF and lasix usage  - will hold lasix for now  - PO hydration encouraged  - will monitor    #CAD with stents  - not on aspirin due to hx of gastritis  - continue statin    #Afib  - CHADSVASc score 6  - rate controlled  - continue coumadin  - continue metoprolol   - INR therapeutic   - monitor INR daily; goal 2-3  - pacemaker interrogation revealed afib w/ rate of 60bpm    #Anemia  - normocytic  - no signs of active bleeding  - will monitor    #BPH  - continue tamsulosin   - continue finasteride    #Depression  - continue zoloft    #DVT ppx  - on coumadin 89 yo male with h/o CAD (with stents), reported h/o Afib, has pacemaker, on coumadin, HTN, CVA (8-10 yrs ago), BPH, depression, presenting with c/o chest discomfort, pressure in abdomen, nausea and loss of appetite, pt also with b/l LE edema and elevated BNP, CXR with R pleural effusion, concerning for likely acute CHF.    #Acute decompensated HFpEF  - BNP of 1430 on admission  - will monitor on telemetry  - CXR - probable R pleural effusion   - continue lasix 40 mg IV BID   - monitor electrolytes and creatinine   - daily weights  - low salt diet  - 1.5 L daily fluid restriction  - strict I/Os  - LE doppler done for b/l LE edema - negative for DVT  - ECHO as above: noted for TR; EF 65%; pHTN  - cardiology consult appreciated     #Chest discomfort  - likely 2/2 to GERD  - EKG - ventricular paced, no obvious ST-T wave changes  - Trop neg x2  - s/p ASA 325mg in the ED  - continue Pepcid    #JACQUELIN  - likely prerenal in the setting of CHF   - PO hydration encouraged  - will monitor    #CAD with stents  - not on aspirin due to hx of gastritis  - continue statin    #Afib  - CHADSVASc score 6  - rate controlled  - continue coumadin  - continue metoprolol   - INR therapeutic   - monitor INR daily; goal 2-3  - pacemaker interrogation revealed afib w/ rate of 60bpm    #Anemia  - normocytic  - no signs of active bleeding  - will monitor    #BPH  - continue tamsulosin   - continue finasteride    #Depression  - continue zoloft    #DVT ppx  - on coumadin

## 2019-05-13 DIAGNOSIS — K21.9 GASTRO-ESOPHAGEAL REFLUX DISEASE WITHOUT ESOPHAGITIS: ICD-10-CM

## 2019-05-13 DIAGNOSIS — I11.0 HYPERTENSIVE HEART DISEASE WITH HEART FAILURE: ICD-10-CM

## 2019-05-13 DIAGNOSIS — I25.10 ATHEROSCLEROTIC HEART DISEASE OF NATIVE CORONARY ARTERY WITHOUT ANGINA PECTORIS: ICD-10-CM

## 2019-05-13 DIAGNOSIS — N40.0 BENIGN PROSTATIC HYPERPLASIA WITHOUT LOWER URINARY TRACT SYMPTOMS: ICD-10-CM

## 2019-05-13 DIAGNOSIS — F32.9 MAJOR DEPRESSIVE DISORDER, SINGLE EPISODE, UNSPECIFIED: ICD-10-CM

## 2019-05-13 DIAGNOSIS — I50.33 ACUTE ON CHRONIC DIASTOLIC (CONGESTIVE) HEART FAILURE: ICD-10-CM

## 2019-05-13 DIAGNOSIS — D64.9 ANEMIA, UNSPECIFIED: ICD-10-CM

## 2019-05-13 DIAGNOSIS — N17.9 ACUTE KIDNEY FAILURE, UNSPECIFIED: ICD-10-CM

## 2019-06-01 ENCOUNTER — OUTPATIENT (OUTPATIENT)
Dept: OUTPATIENT SERVICES | Facility: HOSPITAL | Age: 84
LOS: 1 days | End: 2019-06-01
Payer: MEDICARE

## 2019-06-01 DIAGNOSIS — Z90.49 ACQUIRED ABSENCE OF OTHER SPECIFIED PARTS OF DIGESTIVE TRACT: Chronic | ICD-10-CM

## 2019-06-01 PROCEDURE — G9001: CPT

## 2019-06-10 DIAGNOSIS — Z71.89 OTHER SPECIFIED COUNSELING: ICD-10-CM

## 2019-06-10 PROBLEM — Z95.0 PRESENCE OF CARDIAC PACEMAKER: Chronic | Status: ACTIVE | Noted: 2019-05-05

## 2019-06-10 PROBLEM — N40.0 BENIGN PROSTATIC HYPERPLASIA WITHOUT LOWER URINARY TRACT SYMPTOMS: Chronic | Status: ACTIVE | Noted: 2019-05-05

## 2019-06-10 PROBLEM — I10 ESSENTIAL (PRIMARY) HYPERTENSION: Chronic | Status: ACTIVE | Noted: 2019-05-05

## 2019-07-04 ENCOUNTER — EMERGENCY (EMERGENCY)
Facility: HOSPITAL | Age: 84
LOS: 0 days | Discharge: HOME | End: 2019-07-04
Attending: EMERGENCY MEDICINE | Admitting: EMERGENCY MEDICINE
Payer: MEDICARE

## 2019-07-04 VITALS
OXYGEN SATURATION: 97 % | RESPIRATION RATE: 15 BRPM | SYSTOLIC BLOOD PRESSURE: 134 MMHG | HEART RATE: 59 BPM | DIASTOLIC BLOOD PRESSURE: 55 MMHG | TEMPERATURE: 98 F

## 2019-07-04 DIAGNOSIS — T54.94XA TOXIC EFFECT OF UNSPECIFIED CORROSIVE SUBSTANCE, UNDETERMINED, INITIAL ENCOUNTER: ICD-10-CM

## 2019-07-04 DIAGNOSIS — K04.7 PERIAPICAL ABSCESS WITHOUT SINUS: ICD-10-CM

## 2019-07-04 DIAGNOSIS — T23.601A: ICD-10-CM
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
## 2019-07-04 DIAGNOSIS — T23.602A: ICD-10-CM

## 2019-07-04 DIAGNOSIS — I25.10 ATHEROSCLEROTIC HEART DISEASE OF NATIVE CORONARY ARTERY WITHOUT ANGINA PECTORIS: ICD-10-CM

## 2019-07-04 DIAGNOSIS — Z79.01 LONG TERM (CURRENT) USE OF ANTICOAGULANTS: ICD-10-CM

## 2019-07-04 DIAGNOSIS — Z90.49 ACQUIRED ABSENCE OF OTHER SPECIFIED PARTS OF DIGESTIVE TRACT: Chronic | ICD-10-CM

## 2019-07-04 DIAGNOSIS — N40.0 BENIGN PROSTATIC HYPERPLASIA WITHOUT LOWER URINARY TRACT SYMPTOMS: ICD-10-CM

## 2019-07-04 DIAGNOSIS — T23.612A: ICD-10-CM

## 2019-07-04 DIAGNOSIS — Z86.73 PERSONAL HISTORY OF TRANSIENT ISCHEMIC ATTACK (TIA), AND CEREBRAL INFARCTION WITHOUT RESIDUAL DEFICITS: ICD-10-CM

## 2019-07-04 DIAGNOSIS — Z23 ENCOUNTER FOR IMMUNIZATION: ICD-10-CM

## 2019-07-04 DIAGNOSIS — M79.641 PAIN IN RIGHT HAND: ICD-10-CM

## 2019-07-04 DIAGNOSIS — I10 ESSENTIAL (PRIMARY) HYPERTENSION: ICD-10-CM
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
## 2019-07-04 DIAGNOSIS — Z95.0 PRESENCE OF CARDIAC PACEMAKER: ICD-10-CM

## 2019-07-04 PROCEDURE — 99283 EMERGENCY DEPT VISIT LOW MDM: CPT

## 2019-07-04 RX ORDER — PENICILLIN V POTASSIUM 250 MG
1 TABLET ORAL
Qty: 21 | Refills: 0
Start: 2019-07-04 | End: 2019-07-10

## 2019-07-04 RX ORDER — PENICILLIN V POTASSIUM 250 MG
500 TABLET ORAL ONCE
Refills: 0 | Status: COMPLETED | OUTPATIENT
Start: 2019-07-04 | End: 2019-07-04

## 2019-07-04 RX ORDER — TETANUS TOXOID, REDUCED DIPHTHERIA TOXOID AND ACELLULAR PERTUSSIS VACCINE, ADSORBED 5; 2.5; 8; 8; 2.5 [IU]/.5ML; [IU]/.5ML; UG/.5ML; UG/.5ML; UG/.5ML
0.5 SUSPENSION INTRAMUSCULAR ONCE
Refills: 0 | Status: COMPLETED | OUTPATIENT
Start: 2019-07-04 | End: 2019-07-04

## 2019-07-04 RX ORDER — MUPIROCIN 20 MG/G
1 OINTMENT TOPICAL ONCE
Refills: 0 | Status: COMPLETED | OUTPATIENT
Start: 2019-07-04 | End: 2019-07-04

## 2019-07-04 RX ORDER — MUPIROCIN 20 MG/G
1 OINTMENT TOPICAL
Qty: 1 | Refills: 0
Start: 2019-07-04

## 2019-07-04 RX ADMIN — Medication 500 MILLIGRAM(S): at 13:43

## 2019-07-04 RX ADMIN — TETANUS TOXOID, REDUCED DIPHTHERIA TOXOID AND ACELLULAR PERTUSSIS VACCINE, ADSORBED 0.5 MILLILITER(S): 5; 2.5; 8; 8; 2.5 SUSPENSION INTRAMUSCULAR at 13:43

## 2019-07-04 RX ADMIN — MUPIROCIN 1 APPLICATION(S): 20 OINTMENT TOPICAL at 13:45

## 2019-07-04 NOTE — ED STATDOCS - PMH
BPH (benign prostatic hyperplasia)    Coronary artery disease    CVA (cerebrovascular accident)    HTN (hypertension)    Pacemaker

## 2019-07-04 NOTE — ED STATDOCS - ATTENDING CONTRIBUTION TO CARE
I, Lalo De La Fuente, performed the initial face to face bedside interview with this patient regarding history of present illness, review of symptoms and relevant past medical, social and family history.  I completed an independent physical examination.  I was the initial provider who evaluated this patient. I have signed out the follow up of any pending tests (i.e. labs, radiological studies) to the ACP.  I have communicated the patient’s plan of care and disposition with the ACP.  The history, relevant review of systems, past medical and surgical history, medical decision making, and physical examination was documented by the scribe in my presence and I attest to the accuracy of the documentation.
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
## 2019-07-04 NOTE — ED STATDOCS - NSFOLLOWUPINSTRUCTIONS_ED_ALL_ED_FT
APPLY OINTMENT 2-3 TIMES A DAY UNTIL MONTES HEALED.      FOLLOW UP WITH YOUR DENTIST FOR FURTHER MANAGEMENT OF YOUR DENTAL PAIN.    RETURN TO ER OR SEE PRIMARY CARE DOCTOR IF ANY SYMPTOMS BECOME WORSE, IF YOU DEVELOP A FEVER OR IF THERE IS ANY NEW CONCERNING SYMPTOM    Superficial Burn    WHAT YOU NEED TO KNOW:    A superficial burn, or first-degree burn, is when the outer layer of skin has been burned.    DISCHARGE INSTRUCTIONS:    Call 911 for any of the following:     You have trouble breathing.         Return to the emergency department if:     You have a burn to the face, neck, hands, or genitals.       Your burn covers a large area such as your trunk or entire arm or leg.       You have increased redness, numbness, or swelling in the superficial burn area.      You have red streaks or blisters spreading outward from the superficial burn.      Your pain is not relieved, or is getting worse even after you take medicine.    Contact your healthcare provider if:     You have a fever.      You have questions or concerns about your condition or care.    Medicines:     Ibuprofen or acetaminophen are given to decrease your pain and swelling. They are available without a doctor's order. These medicines can cause liver or kidney problems if they are not used correctly. Ask how much medicine is safe to take, and how often to take it.      Take your medicine as directed. Contact your healthcare provider if you think your medicine is not helping or if you have side effects. Tell him of her if you are allergic to any medicine. Keep a list of the medicines, vitamins, and herbs you take. Include the amounts, and when and why you take them. Bring the list or the pill bottles to follow-up visits. Carry your medicine list with you in case of an emergency.    Follow up with your healthcare provider as directed: Write down your questions so you remember to ask them during your visits.    First aid for a superficial burn: A superficial burn usually heals in 3 to 5 days without scarring or blisters. Use the following first-aid guide to treat your burn:     Remove clothing and jewelry immediately.      Flush liquid chemicals from your skin completely with large amounts of cool running water. Do not splash the chemicals into your eyes. Run cool water over area of burn           Brush dry chemicals off your skin if large amounts of water are not available. Small amounts of water will activate some chemicals, such as lime, and cause more damage. Do not splash the chemicals into your eyes.      Run cool or cold temperature water over the burned area for 10 minutes. A cold or cool compress can also be put on the burn. Do not use ice or ice water on the affected area. This may cause more damage to the skin.      Use an antibacterial ointment or skin cream, such as aloe vera cream, to soothe the skin. Do not put butter, petroleum jelly, or other home remedies on skin burned by a chemical.      Do not put a bandage on the burn until you are told to do so by your healthcare provider.    Prevent superficial burns:     Do not leave cups, mugs, or bowls containing hot liquids at the edge of a table. Keep pot handles turned away from the stove front.       Do not leave a lit cigarette. Discard it properly. Keep cigarette lighters and matches in a safe place where children cannot reach them.      Keep your water heater setting to low or medium (90°F to 120°F, or 32°C to 48°C).      Wear sunscreen that has a sun protectant factor (SPF) of 15 or higher. The sunscreen should also have ultraviolet A (UVA) and ultraviolet B (UVB) protection. Follow the directions on the label when you use sunscreen. Put on more sunscreen if you are in the sun for more than an hour. Reapply sunscreen often if you go swimming or are sweating.    Toothache    WHAT YOU NEED TO KNOW:    A toothache is pain that is caused by irritation of the nerves in the center of your tooth. The irritation may be caused by several problems, such as a cavity, an infection, a cracked tooth, or gum disease. Tooth Anatomy         DISCHARGE INSTRUCTIONS:    Return to the emergency department if:     You have trouble breathing or swallowing.       You have swelling in your face or neck.     Contact your dentist if:     You have a fever and chills.       You have trouble opening or closing your mouth.       You have swelling around your tooth.       You have questions or concerns about your condition or care.    Medicines: You may need any of the following:     NSAIDs, such as ibuprofen, help decrease swelling, pain, and fever. This medicine is available with or without a doctor's order. NSAIDs can cause stomach bleeding or kidney problems in certain people. If you take blood thinner medicine, always ask if NSAIDs are safe for you. Always read the medicine label and follow directions. Do not give these medicines to children under 6 months of age without direction from your child's healthcare provider.      Acetaminophen decreases pain and fever. It is available without a doctor's order. Ask how much to take and how often to take it. Follow directions. Acetaminophen can cause liver damage if not taken correctly.      Prescription pain medicine may be given. Ask your healthcare provider how to take this medicine safely. Some prescription pain medicines contain acetaminophen. Do not take other medicines that contain acetaminophen without talking to your healthcare provider. Too much acetaminophen may cause liver damage. Prescription pain medicine may cause constipation. Ask your healthcare provider how to prevent or treat constipation.       Antibiotics help treat or prevent a bacterial infection.       Take your medicine as directed. Contact your healthcare provider if you think your medicine is not helping or if you have side effects. Tell him of her if you are allergic to any medicine. Keep a list of the medicines, vitamins, and herbs you take. Include the amounts, and when and why you take them. Bring the list or the pill bottles to follow-up visits. Carry your medicine list with you in case of an emergency.    Self-care:     Rinse your mouth with warm salt water 4 times a day or as directed.       Eat soft foods to help relieve pain caused by chewing.       Apply ice on your jaw or cheek for 15 to 20 minutes every hour or as directed. Use an ice pack, or put crushed ice in a plastic bag. Cover it with a towel before you apply it. Ice helps prevent tissue damage and decreases swelling and pain.    Help prevent a toothache:     Brush your teeth at least 2 times a day.      Use dental floss to clean between your teeth at least 1 time a day.      See your dentist regularly every 6 months for dental cleanings and oral exams.    Follow up with your dentist as directed: You may be referred to a dental surgeon. Write down your questions so you remember to ask them during your visit.

## 2019-07-04 NOTE — ED STATDOCS - SKIN, MLM
+Erythematous blister forming on volar surface on middle of right hand. +Back of left thumb at MCP joint with blister 0.5 cm. +On web space between 1st and 2nd finger of left hand there is erythematous blisters 2 cm in diameter with tenderness.

## 2019-07-04 NOTE — ED ADULT TRIAGE NOTE - CHIEF COMPLAINT QUOTE
Pain to both hands for 2 days. Overnight patient developed blisters to bilateral hands and gum swelling.

## 2019-07-04 NOTE — ED STATDOCS - MUSCULOSKELETAL, MLM
+Swelling and tenderness to maxillary gingivae, missing right upper lateral incisor. range of motion is not limited and there is no muscle tenderness.

## 2019-07-04 NOTE — ED STATDOCS - CLINICAL SUMMARY MEDICAL DECISION MAKING FREE TEXT BOX
Plan for tDap and discharge with Bactroban cream for hands. Will also prescribe Abx and have pt follow-up with dentist for swelling and tenderness to gingivae.

## 2019-07-07 ENCOUNTER — EMERGENCY (EMERGENCY)
Facility: HOSPITAL | Age: 84
LOS: 0 days | Discharge: HOME | End: 2019-07-07
Attending: EMERGENCY MEDICINE | Admitting: EMERGENCY MEDICINE
Payer: MEDICARE

## 2019-07-07 VITALS
TEMPERATURE: 98 F | OXYGEN SATURATION: 96 % | DIASTOLIC BLOOD PRESSURE: 58 MMHG | SYSTOLIC BLOOD PRESSURE: 151 MMHG | RESPIRATION RATE: 17 BRPM | HEART RATE: 59 BPM

## 2019-07-07 VITALS — HEIGHT: 68 IN | WEIGHT: 175.05 LBS

## 2019-07-07 DIAGNOSIS — Z95.5 PRESENCE OF CORONARY ANGIOPLASTY IMPLANT AND GRAFT: ICD-10-CM

## 2019-07-07 DIAGNOSIS — Z95.0 PRESENCE OF CARDIAC PACEMAKER: ICD-10-CM

## 2019-07-07 DIAGNOSIS — T23.461A: ICD-10-CM

## 2019-07-07 DIAGNOSIS — T23.402A: ICD-10-CM

## 2019-07-07 DIAGNOSIS — I25.10 ATHEROSCLEROTIC HEART DISEASE OF NATIVE CORONARY ARTERY WITHOUT ANGINA PECTORIS: ICD-10-CM

## 2019-07-07 DIAGNOSIS — T54.3X1A TOXIC EFFECT OF CORROSIVE ALKALIS AND ALKALI-LIKE SUBSTANCES, ACCIDENTAL (UNINTENTIONAL), INITIAL ENCOUNTER: ICD-10-CM

## 2019-07-07 DIAGNOSIS — N40.0 BENIGN PROSTATIC HYPERPLASIA WITHOUT LOWER URINARY TRACT SYMPTOMS: ICD-10-CM

## 2019-07-07 DIAGNOSIS — I10 ESSENTIAL (PRIMARY) HYPERTENSION: ICD-10-CM

## 2019-07-07 DIAGNOSIS — T23.401A: ICD-10-CM

## 2019-07-07 DIAGNOSIS — Z86.73 PERSONAL HISTORY OF TRANSIENT ISCHEMIC ATTACK (TIA), AND CEREBRAL INFARCTION WITHOUT RESIDUAL DEFICITS: ICD-10-CM
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
## 2019-07-07 DIAGNOSIS — Z90.49 ACQUIRED ABSENCE OF OTHER SPECIFIED PARTS OF DIGESTIVE TRACT: Chronic | ICD-10-CM

## 2019-07-07 PROCEDURE — 99283 EMERGENCY DEPT VISIT LOW MDM: CPT

## 2019-07-07 RX ORDER — MUPIROCIN 20 MG/G
1 OINTMENT TOPICAL ONCE
Refills: 0 | Status: COMPLETED | OUTPATIENT
Start: 2019-07-07 | End: 2019-07-07

## 2019-07-07 RX ADMIN — MUPIROCIN 1 APPLICATION(S): 20 OINTMENT TOPICAL at 21:00

## 2019-07-07 NOTE — ED PROVIDER NOTE - CARE PROVIDER_API CALL
Lai Marin)  Orthopaedic Surgery; Surgery of the Hand  [address] [place] [address]  Phone: [phone]  Fax: [phone]  Follow Up Time:

## 2019-07-07 NOTE — ED PROVIDER NOTE - MUSCULOSKELETAL, MLM
Spine appears normal, range of motion is not limited, no muscle or joint tenderness, ROSALES x4, no focal swelling/tenderness

## 2019-07-07 NOTE — ED PROVIDER NOTE - SKIN, MLM
Skin normal color for race, warm, dry and intact. +diffuse erythema to BL hands and digits, +mild edema, good perfusion, FROM of all digits, +couple of blisters to dorsum of right hand, non-hemorrhagic
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
## 2019-07-07 NOTE — ED PROVIDER NOTE - ENMT, MLM
Airway patent, Nasal mucosa clear. Mucous membranes mildly dry.  Throat has no vesicles, no oropharyngeal exudates and uvula is midline.
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
## 2019-07-07 NOTE — ED PROVIDER NOTE - ATTENDING CONTRIBUTION TO CARE
I, Jeff Olmos MD, personally saw the patient with the PA, and completed the key components of the history and physical exam. I then discussed the management plan with the PA.
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
## 2019-07-07 NOTE — ED ADULT NURSE NOTE - NSIMPLEMENTINTERV_GEN_ALL_ED
Implemented All Universal Safety Interventions:  [place] to call system. Call bell, personal items and telephone within reach. Instruct patient to call for assistance. Room bathroom lighting operational. Non-slip footwear when patient is off stretcher. Physically safe environment: no spills, clutter or unnecessary equipment. Stretcher in lowest position, wheels locked, appropriate side rails in place.

## 2019-09-09 ENCOUNTER — INPATIENT (INPATIENT)
Facility: HOSPITAL | Age: 84
LOS: 2 days | Discharge: HOME HEALTH | DRG: 193 | End: 2019-09-12
Attending: FAMILY MEDICINE | Admitting: FAMILY MEDICINE
Payer: MEDICARE

## 2019-09-09 VITALS
TEMPERATURE: 98 F | DIASTOLIC BLOOD PRESSURE: 44 MMHG | HEART RATE: 60 BPM | OXYGEN SATURATION: 100 % | SYSTOLIC BLOOD PRESSURE: 137 MMHG | WEIGHT: 156.09 LBS | RESPIRATION RATE: 17 BRPM

## 2019-09-09 DIAGNOSIS — I11.0 HYPERTENSIVE HEART DISEASE WITH HEART FAILURE: ICD-10-CM

## 2019-09-09 DIAGNOSIS — Z86.73 PERSONAL HISTORY OF TRANSIENT ISCHEMIC ATTACK (TIA), AND CEREBRAL INFARCTION WITHOUT RESIDUAL DEFICITS: ICD-10-CM

## 2019-09-09 DIAGNOSIS — J84.9 INTERSTITIAL PULMONARY DISEASE, UNSPECIFIED: ICD-10-CM

## 2019-09-09 DIAGNOSIS — R50.9 FEVER, UNSPECIFIED: ICD-10-CM

## 2019-09-09 DIAGNOSIS — D64.9 ANEMIA, UNSPECIFIED: ICD-10-CM

## 2019-09-09 DIAGNOSIS — Z95.5 PRESENCE OF CORONARY ANGIOPLASTY IMPLANT AND GRAFT: Chronic | ICD-10-CM

## 2019-09-09 DIAGNOSIS — N17.9 ACUTE KIDNEY FAILURE, UNSPECIFIED: ICD-10-CM

## 2019-09-09 DIAGNOSIS — J18.9 PNEUMONIA, UNSPECIFIED ORGANISM: ICD-10-CM

## 2019-09-09 DIAGNOSIS — F32.9 MAJOR DEPRESSIVE DISORDER, SINGLE EPISODE, UNSPECIFIED: ICD-10-CM

## 2019-09-09 DIAGNOSIS — Z95.0 PRESENCE OF CARDIAC PACEMAKER: ICD-10-CM

## 2019-09-09 DIAGNOSIS — R06.02 SHORTNESS OF BREATH: ICD-10-CM

## 2019-09-09 DIAGNOSIS — I27.20 PULMONARY HYPERTENSION, UNSPECIFIED: ICD-10-CM

## 2019-09-09 DIAGNOSIS — I50.33 ACUTE ON CHRONIC DIASTOLIC (CONGESTIVE) HEART FAILURE: ICD-10-CM

## 2019-09-09 DIAGNOSIS — Z90.49 ACQUIRED ABSENCE OF OTHER SPECIFIED PARTS OF DIGESTIVE TRACT: Chronic | ICD-10-CM

## 2019-09-09 DIAGNOSIS — I25.10 ATHEROSCLEROTIC HEART DISEASE OF NATIVE CORONARY ARTERY WITHOUT ANGINA PECTORIS: ICD-10-CM

## 2019-09-09 DIAGNOSIS — R79.1 ABNORMAL COAGULATION PROFILE: ICD-10-CM

## 2019-09-09 DIAGNOSIS — I48.91 UNSPECIFIED ATRIAL FIBRILLATION: ICD-10-CM

## 2019-09-09 DIAGNOSIS — N40.0 BENIGN PROSTATIC HYPERPLASIA WITHOUT LOWER URINARY TRACT SYMPTOMS: ICD-10-CM

## 2019-09-09 LAB
ALBUMIN SERPL ELPH-MCNC: 3.2 G/DL — LOW (ref 3.3–5)
ALP SERPL-CCNC: 97 U/L — SIGNIFICANT CHANGE UP (ref 40–120)
ALT FLD-CCNC: 26 U/L — SIGNIFICANT CHANGE UP (ref 12–78)
ANION GAP SERPL CALC-SCNC: 8 MMOL/L — SIGNIFICANT CHANGE UP (ref 5–17)
APPEARANCE UR: CLEAR — SIGNIFICANT CHANGE UP
APTT BLD: 42.5 SEC — HIGH (ref 27.5–36.3)
AST SERPL-CCNC: 23 U/L — SIGNIFICANT CHANGE UP (ref 15–37)
BASOPHILS # BLD AUTO: 0.02 K/UL — SIGNIFICANT CHANGE UP (ref 0–0.2)
BASOPHILS NFR BLD AUTO: 0.2 % — SIGNIFICANT CHANGE UP (ref 0–2)
BILIRUB SERPL-MCNC: 1.6 MG/DL — HIGH (ref 0.2–1.2)
BILIRUB UR-MCNC: NEGATIVE — SIGNIFICANT CHANGE UP
BUN SERPL-MCNC: 41 MG/DL — HIGH (ref 7–23)
CALCIUM SERPL-MCNC: 8.8 MG/DL — SIGNIFICANT CHANGE UP (ref 8.5–10.1)
CHLORIDE SERPL-SCNC: 102 MMOL/L — SIGNIFICANT CHANGE UP (ref 96–108)
CO2 SERPL-SCNC: 28 MMOL/L — SIGNIFICANT CHANGE UP (ref 22–31)
COLOR SPEC: YELLOW — SIGNIFICANT CHANGE UP
CREAT SERPL-MCNC: 1.83 MG/DL — HIGH (ref 0.5–1.3)
DIFF PNL FLD: NEGATIVE — SIGNIFICANT CHANGE UP
EOSINOPHIL # BLD AUTO: 0.01 K/UL — SIGNIFICANT CHANGE UP (ref 0–0.5)
EOSINOPHIL NFR BLD AUTO: 0.1 % — SIGNIFICANT CHANGE UP (ref 0–6)
GLUCOSE SERPL-MCNC: 105 MG/DL — HIGH (ref 70–99)
GLUCOSE UR QL: NEGATIVE MG/DL — SIGNIFICANT CHANGE UP
HCT VFR BLD CALC: 31.1 % — LOW (ref 39–50)
HGB BLD-MCNC: 10.1 G/DL — LOW (ref 13–17)
IMM GRANULOCYTES NFR BLD AUTO: 0.9 % — SIGNIFICANT CHANGE UP (ref 0–1.5)
INR BLD: 3.05 RATIO — HIGH (ref 0.88–1.16)
KETONES UR-MCNC: NEGATIVE — SIGNIFICANT CHANGE UP
LACTATE SERPL-SCNC: 1.3 MMOL/L — SIGNIFICANT CHANGE UP (ref 0.7–2)
LEUKOCYTE ESTERASE UR-ACNC: NEGATIVE — SIGNIFICANT CHANGE UP
LYMPHOCYTES # BLD AUTO: 0.78 K/UL — LOW (ref 1–3.3)
LYMPHOCYTES # BLD AUTO: 7.8 % — LOW (ref 13–44)
MCHC RBC-ENTMCNC: 31.5 PG — SIGNIFICANT CHANGE UP (ref 27–34)
MCHC RBC-ENTMCNC: 32.5 GM/DL — SIGNIFICANT CHANGE UP (ref 32–36)
MCV RBC AUTO: 96.9 FL — SIGNIFICANT CHANGE UP (ref 80–100)
MONOCYTES # BLD AUTO: 0.77 K/UL — SIGNIFICANT CHANGE UP (ref 0–0.9)
MONOCYTES NFR BLD AUTO: 7.7 % — SIGNIFICANT CHANGE UP (ref 2–14)
NEUTROPHILS # BLD AUTO: 8.34 K/UL — HIGH (ref 1.8–7.4)
NEUTROPHILS NFR BLD AUTO: 83.3 % — HIGH (ref 43–77)
NITRITE UR-MCNC: NEGATIVE — SIGNIFICANT CHANGE UP
NT-PROBNP SERPL-SCNC: 3022 PG/ML — HIGH (ref 0–450)
PH UR: 5 — SIGNIFICANT CHANGE UP (ref 5–8)
PLATELET # BLD AUTO: 184 K/UL — SIGNIFICANT CHANGE UP (ref 150–400)
POTASSIUM SERPL-MCNC: 4.1 MMOL/L — SIGNIFICANT CHANGE UP (ref 3.5–5.3)
POTASSIUM SERPL-SCNC: 4.1 MMOL/L — SIGNIFICANT CHANGE UP (ref 3.5–5.3)
PROT SERPL-MCNC: 7.5 GM/DL — SIGNIFICANT CHANGE UP (ref 6–8.3)
PROT UR-MCNC: NEGATIVE MG/DL — SIGNIFICANT CHANGE UP
PROTHROM AB SERPL-ACNC: 35 SEC — HIGH (ref 10–12.9)
RAPID RVP RESULT: SIGNIFICANT CHANGE UP
RBC # BLD: 3.21 M/UL — LOW (ref 4.2–5.8)
RBC # FLD: 15.3 % — HIGH (ref 10.3–14.5)
SODIUM SERPL-SCNC: 138 MMOL/L — SIGNIFICANT CHANGE UP (ref 135–145)
SP GR SPEC: 1.01 — SIGNIFICANT CHANGE UP (ref 1.01–1.02)
TROPONIN I SERPL-MCNC: <0.015 NG/ML — SIGNIFICANT CHANGE UP (ref 0.01–0.04)
TROPONIN I SERPL-MCNC: <0.015 NG/ML — SIGNIFICANT CHANGE UP (ref 0.01–0.04)
UROBILINOGEN FLD QL: NEGATIVE MG/DL — SIGNIFICANT CHANGE UP
WBC # BLD: 10.01 K/UL — SIGNIFICANT CHANGE UP (ref 3.8–10.5)
WBC # FLD AUTO: 10.01 K/UL — SIGNIFICANT CHANGE UP (ref 3.8–10.5)

## 2019-09-09 PROCEDURE — 87798 DETECT AGENT NOS DNA AMP: CPT

## 2019-09-09 PROCEDURE — 85025 COMPLETE CBC W/AUTO DIFF WBC: CPT

## 2019-09-09 PROCEDURE — 84100 ASSAY OF PHOSPHORUS: CPT

## 2019-09-09 PROCEDURE — 71250 CT THORAX DX C-: CPT | Mod: 26

## 2019-09-09 PROCEDURE — 84484 ASSAY OF TROPONIN QUANT: CPT

## 2019-09-09 PROCEDURE — 97163 PT EVAL HIGH COMPLEX 45 MIN: CPT | Mod: GP

## 2019-09-09 PROCEDURE — 81003 URINALYSIS AUTO W/O SCOPE: CPT

## 2019-09-09 PROCEDURE — 36415 COLL VENOUS BLD VENIPUNCTURE: CPT

## 2019-09-09 PROCEDURE — 85610 PROTHROMBIN TIME: CPT

## 2019-09-09 PROCEDURE — 94761 N-INVAS EAR/PLS OXIMETRY MLT: CPT

## 2019-09-09 PROCEDURE — 85027 COMPLETE CBC AUTOMATED: CPT

## 2019-09-09 PROCEDURE — 87086 URINE CULTURE/COLONY COUNT: CPT

## 2019-09-09 PROCEDURE — 94640 AIRWAY INHALATION TREATMENT: CPT

## 2019-09-09 PROCEDURE — 93970 EXTREMITY STUDY: CPT

## 2019-09-09 PROCEDURE — 87486 CHLMYD PNEUM DNA AMP PROBE: CPT

## 2019-09-09 PROCEDURE — 87070 CULTURE OTHR SPECIMN AEROBIC: CPT

## 2019-09-09 PROCEDURE — 97116 GAIT TRAINING THERAPY: CPT | Mod: GP

## 2019-09-09 PROCEDURE — 83735 ASSAY OF MAGNESIUM: CPT

## 2019-09-09 PROCEDURE — 93010 ELECTROCARDIOGRAM REPORT: CPT

## 2019-09-09 PROCEDURE — 87581 M.PNEUMON DNA AMP PROBE: CPT

## 2019-09-09 PROCEDURE — 71045 X-RAY EXAM CHEST 1 VIEW: CPT | Mod: 26

## 2019-09-09 PROCEDURE — 87449 NOS EACH ORGANISM AG IA: CPT

## 2019-09-09 PROCEDURE — 80048 BASIC METABOLIC PNL TOTAL CA: CPT

## 2019-09-09 PROCEDURE — 87633 RESP VIRUS 12-25 TARGETS: CPT

## 2019-09-09 RX ORDER — METOPROLOL TARTRATE 50 MG
12.5 TABLET ORAL
Refills: 0 | Status: DISCONTINUED | OUTPATIENT
Start: 2019-09-09 | End: 2019-09-12

## 2019-09-09 RX ORDER — IPRATROPIUM/ALBUTEROL SULFATE 18-103MCG
3 AEROSOL WITH ADAPTER (GRAM) INHALATION EVERY 6 HOURS
Refills: 0 | Status: DISCONTINUED | OUTPATIENT
Start: 2019-09-09 | End: 2019-09-12

## 2019-09-09 RX ORDER — TIMOLOL 0.5 %
1 DROPS OPHTHALMIC (EYE)
Qty: 0 | Refills: 0 | DISCHARGE

## 2019-09-09 RX ORDER — AZITHROMYCIN 500 MG/1
500 TABLET, FILM COATED ORAL ONCE
Refills: 0 | Status: COMPLETED | OUTPATIENT
Start: 2019-09-09 | End: 2019-09-09

## 2019-09-09 RX ORDER — ONDANSETRON 8 MG/1
4 TABLET, FILM COATED ORAL EVERY 6 HOURS
Refills: 0 | Status: DISCONTINUED | OUTPATIENT
Start: 2019-09-09 | End: 2019-09-12

## 2019-09-09 RX ORDER — AZITHROMYCIN 500 MG/1
500 TABLET, FILM COATED ORAL EVERY 24 HOURS
Refills: 0 | Status: DISCONTINUED | OUTPATIENT
Start: 2019-09-10 | End: 2019-09-12

## 2019-09-09 RX ORDER — FUROSEMIDE 40 MG
40 TABLET ORAL
Refills: 0 | Status: DISCONTINUED | OUTPATIENT
Start: 2019-09-09 | End: 2019-09-09

## 2019-09-09 RX ORDER — ACETAMINOPHEN 500 MG
650 TABLET ORAL EVERY 6 HOURS
Refills: 0 | Status: DISCONTINUED | OUTPATIENT
Start: 2019-09-09 | End: 2019-09-12

## 2019-09-09 RX ORDER — CEFTRIAXONE 500 MG/1
1000 INJECTION, POWDER, FOR SOLUTION INTRAMUSCULAR; INTRAVENOUS EVERY 24 HOURS
Refills: 0 | Status: DISCONTINUED | OUTPATIENT
Start: 2019-09-09 | End: 2019-09-12

## 2019-09-09 RX ORDER — SERTRALINE 25 MG/1
25 TABLET, FILM COATED ORAL DAILY
Refills: 0 | Status: DISCONTINUED | OUTPATIENT
Start: 2019-09-09 | End: 2019-09-12

## 2019-09-09 RX ORDER — FUROSEMIDE 40 MG
40 TABLET ORAL
Refills: 0 | Status: DISCONTINUED | OUTPATIENT
Start: 2019-09-09 | End: 2019-09-10

## 2019-09-09 RX ORDER — AZITHROMYCIN 500 MG/1
500 TABLET, FILM COATED ORAL ONCE
Refills: 0 | Status: DISCONTINUED | OUTPATIENT
Start: 2019-09-09 | End: 2019-09-09

## 2019-09-09 RX ORDER — AZITHROMYCIN 500 MG/1
TABLET, FILM COATED ORAL
Refills: 0 | Status: DISCONTINUED | OUTPATIENT
Start: 2019-09-09 | End: 2019-09-09

## 2019-09-09 RX ORDER — FINASTERIDE 5 MG/1
5 TABLET, FILM COATED ORAL DAILY
Refills: 0 | Status: DISCONTINUED | OUTPATIENT
Start: 2019-09-09 | End: 2019-09-12

## 2019-09-09 RX ORDER — ATORVASTATIN CALCIUM 80 MG/1
40 TABLET, FILM COATED ORAL AT BEDTIME
Refills: 0 | Status: DISCONTINUED | OUTPATIENT
Start: 2019-09-09 | End: 2019-09-12

## 2019-09-09 RX ORDER — ACETAMINOPHEN 500 MG
650 TABLET ORAL ONCE
Refills: 0 | Status: COMPLETED | OUTPATIENT
Start: 2019-09-09 | End: 2019-09-09

## 2019-09-09 RX ORDER — TIMOLOL 0.5 %
1 DROPS OPHTHALMIC (EYE) DAILY
Refills: 0 | Status: DISCONTINUED | OUTPATIENT
Start: 2019-09-09 | End: 2019-09-12

## 2019-09-09 RX ORDER — METOPROLOL TARTRATE 50 MG
1 TABLET ORAL
Qty: 0 | Refills: 0 | DISCHARGE

## 2019-09-09 RX ORDER — CEFTRIAXONE 500 MG/1
1000 INJECTION, POWDER, FOR SOLUTION INTRAMUSCULAR; INTRAVENOUS ONCE
Refills: 0 | Status: COMPLETED | OUTPATIENT
Start: 2019-09-09 | End: 2019-09-09

## 2019-09-09 RX ORDER — PANTOPRAZOLE SODIUM 20 MG/1
40 TABLET, DELAYED RELEASE ORAL
Refills: 0 | Status: DISCONTINUED | OUTPATIENT
Start: 2019-09-09 | End: 2019-09-12

## 2019-09-09 RX ORDER — WARFARIN SODIUM 2.5 MG/1
1 TABLET ORAL
Qty: 0 | Refills: 0 | DISCHARGE

## 2019-09-09 RX ORDER — TAMSULOSIN HYDROCHLORIDE 0.4 MG/1
0.4 CAPSULE ORAL AT BEDTIME
Refills: 0 | Status: DISCONTINUED | OUTPATIENT
Start: 2019-09-09 | End: 2019-09-12

## 2019-09-09 RX ADMIN — CEFTRIAXONE 1000 MILLIGRAM(S): 500 INJECTION, POWDER, FOR SOLUTION INTRAMUSCULAR; INTRAVENOUS at 17:13

## 2019-09-09 RX ADMIN — Medication 3 MILLILITER(S): at 23:21

## 2019-09-09 RX ADMIN — Medication 40 MILLIGRAM(S): at 19:55

## 2019-09-09 RX ADMIN — AZITHROMYCIN 255 MILLIGRAM(S): 500 TABLET, FILM COATED ORAL at 17:33

## 2019-09-09 RX ADMIN — TAMSULOSIN HYDROCHLORIDE 0.4 MILLIGRAM(S): 0.4 CAPSULE ORAL at 23:10

## 2019-09-09 RX ADMIN — Medication 650 MILLIGRAM(S): at 16:32

## 2019-09-09 RX ADMIN — Medication 12.5 MILLIGRAM(S): at 23:11

## 2019-09-09 RX ADMIN — Medication 650 MILLIGRAM(S): at 16:00

## 2019-09-09 RX ADMIN — ATORVASTATIN CALCIUM 40 MILLIGRAM(S): 80 TABLET, FILM COATED ORAL at 23:10

## 2019-09-09 RX ADMIN — AZITHROMYCIN 500 MILLIGRAM(S): 500 TABLET, FILM COATED ORAL at 19:01

## 2019-09-09 RX ADMIN — FINASTERIDE 5 MILLIGRAM(S): 5 TABLET, FILM COATED ORAL at 23:11

## 2019-09-09 NOTE — ED PROVIDER NOTE - CARDIAC, MLM
Normal rate, regular rhythm.  Heart sounds S1, S2.  No murmurs, rubs or gallops. +1 pitting edema bilateral pretibial region

## 2019-09-09 NOTE — H&P ADULT - NEUROLOGICAL DETAILS
responds to verbal commands/responds to pain/sensation intact/cranial nerves intact/strength decreased

## 2019-09-09 NOTE — ED PROVIDER NOTE - CONSTITUTIONAL, MLM
normal... Elderly white male. Well nourished, awake, alert, oriented to person, place, and in no apparent distress. Mildly ill appearing. No sentence shortening

## 2019-09-09 NOTE — ED PROVIDER NOTE - OBJECTIVE STATEMENT
89 y/o male with a PMHx of BPH, CVA, CAD s/p stents, HTN, pacemaker presents to the ED c/o SOB alleviated with rest and lying down. RA pulse ox 96-97%. +generalized weakness. +LE edema. +mild mid chest discomfort. Pt reports that he did not have dinner last night due to decreased appetite. Denies fever, cough. Pt uses a walker at baseline. Pt has a home health aide during the day, but is alone at night. +former smoker.

## 2019-09-09 NOTE — ED ADULT NURSE REASSESSMENT NOTE - NS ED NURSE REASSESS COMMENT FT1
Assumed care of patient from XIN Zeng at 1945. Patient on contact/droplet isolation for RVP pending results. Patient AxOx4. Patient resting comfortably in bed. Patient in no acute signs of distress. Son at bedside, pt prefers Wolof speaking understand and speaks english, son translating as patient request. All needs addressed at this time. Safety and comfort maintained. Will continue to monitor.

## 2019-09-09 NOTE — ED PROVIDER NOTE - CARE PLAN
Principal Discharge DX:	Shortness of breath  Secondary Diagnosis:	CAP (community acquired pneumonia)  Secondary Diagnosis:	Fever

## 2019-09-09 NOTE — ED ADULT NURSE REASSESSMENT NOTE - GENERAL PATIENT STATE
Add Postop Global No-Charge Code (01829)?: yes Detail Level: Simple family/SO at bedside/comfortable appearance

## 2019-09-09 NOTE — ED ADULT NURSE NOTE - OBJECTIVE STATEMENT
pt is a 89 y/o male w/ pmhx of CAD, CVA, HTN, pacemaker, and BPH presenting to ED for eval of worsening weakness, chest discomfort and lower leg swelling since the past 2 days w/ decreased PO intake. pt denies n/v/d , fever or cough.

## 2019-09-09 NOTE — ED PROVIDER NOTE - NEUROLOGICAL, MLM
Alert and oriented to self and place, no focal deficits, no motor or sensory deficits. ROSALES x4. Cranial nerves II-XII intact

## 2019-09-09 NOTE — H&P ADULT - ASSESSMENT
89 y/o male with a PMHx of BPH, CVA, CAD s/p PCI, HTN, Afib on coumadin, pacemaker, who is admitted fro Acute decompensated HFpEF with superimposed CAP.    #Acute decompensated HFpEF  - BNP of >3000 on admission  - will monitor on telemetry given c/o chest discomfort and HEART score 4  - CXR/CT chronic and improved R pleural effusion, seen on previous imaging  - continue lasix 40 mg IV bid ordered, consider nephrology consult as pt with JACQUELIN and on 80mg IV at home  - monitor electrolytes and creatinine, avoid nephrotoxic meds  - daily weights  - DASH/TLC diet  - 1.5 L daily fluid restriction  - strict I/Os  - LE doppler done for b/l Edema as pt with decreased mobility due to dyspnea on exertion  - ECHO May 2019, EF 65%; pHTN  - cardiology consulted: Dr. Loya   - Fall and aspiration precations    #Atypical chest pain, likely MSK in etiology given community acquired pneumonia  -Duonebs  -Incentive spirometry  -bed rest with advance as tolerated and eventual PT eval as may require JAVI placement  -Social work consult  -trend trop  -EKG prn cp  -tylenol prn pain  -Rocephin/azithromycin/bacid  -RVP ordered in ED  -Legionella and strep urine antigen    #JACQUELIN  - likely prerenal in the setting of CHF   - PO hydration encouraged however education given on limitations and 'hidden salt'  - monitor renal function, only one dose of lasix ordered, f/u AM values and readdress with cardiology team     #CAD with stents  - not on aspirin due to hx of gastritis  - continue statin  -cont coumadin    #Afib with supratherapeutic INR  - CHADSVASc score 6  - rate controlled  - continue coumadin, hold tonights dose as >3 and starting abx  - continue metoprolol   - monitor INR daily; goal 2-3    #Anemia  - normocytic  - no signs of active bleeding  - will monitor h/h and coags given on Coumadin    #Protein malnutrition  -f/u prealbumin  -nutrition consulted    #BPH  - continue tamsulosin   - continue finasteride    #Depression  - continue zoloft    #DVT ppx  - on coumadin  -VCD boots

## 2019-09-09 NOTE — H&P ADULT - HISTORY OF PRESENT ILLNESS
89 y/o male with a PMHx of BPH, CVA, CAD s/p PCI, HTN, Afib on coumadin, pacemaker presents to the ED c/o worsening cough and SOB, no sick contacts, rash,  HA, +congestion. Symptoms improve with rest and lying down. Pt also endorses leg swelling that improves when elevated but are more swollen than at baseline. Pt also c/o mid sternal chest pressure. No diaphoresis + chills no recent travel, HA, palpitations n/v/d.

## 2019-09-09 NOTE — ED PROVIDER NOTE - CLINICAL SUMMARY MEDICAL DECISION MAKING FREE TEXT BOX
89 y/o male with a PMHx of BPH, CVA, CAD s/p stents, HTN, pacemaker presents to the ED BIBA from home c/o SOB duration unclear. +mild chest discomfort. SOB exacerbated by activity. +bilateral crackles lower lung fields. +Ankle edema. Plan for EKG, CXR, rectal temp, labs including BNP, blood cultures, lactate, serial troponin, UA. Monitor, observe and reassess

## 2019-09-10 LAB
ANION GAP SERPL CALC-SCNC: 9 MMOL/L — SIGNIFICANT CHANGE UP (ref 5–17)
BASOPHILS # BLD AUTO: 0.01 K/UL — SIGNIFICANT CHANGE UP (ref 0–0.2)
BASOPHILS NFR BLD AUTO: 0.1 % — SIGNIFICANT CHANGE UP (ref 0–2)
BUN SERPL-MCNC: 42 MG/DL — HIGH (ref 7–23)
CALCIUM SERPL-MCNC: 8.6 MG/DL — SIGNIFICANT CHANGE UP (ref 8.5–10.1)
CHLORIDE SERPL-SCNC: 101 MMOL/L — SIGNIFICANT CHANGE UP (ref 96–108)
CO2 SERPL-SCNC: 30 MMOL/L — SIGNIFICANT CHANGE UP (ref 22–31)
CREAT SERPL-MCNC: 1.81 MG/DL — HIGH (ref 0.5–1.3)
CULTURE RESULTS: SIGNIFICANT CHANGE UP
EOSINOPHIL # BLD AUTO: 0.02 K/UL — SIGNIFICANT CHANGE UP (ref 0–0.5)
EOSINOPHIL NFR BLD AUTO: 0.2 % — SIGNIFICANT CHANGE UP (ref 0–6)
GLUCOSE SERPL-MCNC: 92 MG/DL — SIGNIFICANT CHANGE UP (ref 70–99)
HCT VFR BLD CALC: 27.6 % — LOW (ref 39–50)
HGB BLD-MCNC: 8.9 G/DL — LOW (ref 13–17)
IMM GRANULOCYTES NFR BLD AUTO: 0.6 % — SIGNIFICANT CHANGE UP (ref 0–1.5)
INR BLD: 2.77 RATIO — HIGH (ref 0.88–1.16)
LEGIONELLA AG UR QL: NEGATIVE — SIGNIFICANT CHANGE UP
LYMPHOCYTES # BLD AUTO: 0.96 K/UL — LOW (ref 1–3.3)
LYMPHOCYTES # BLD AUTO: 11.1 % — LOW (ref 13–44)
MAGNESIUM SERPL-MCNC: 2.2 MG/DL — SIGNIFICANT CHANGE UP (ref 1.6–2.6)
MCHC RBC-ENTMCNC: 31.2 PG — SIGNIFICANT CHANGE UP (ref 27–34)
MCHC RBC-ENTMCNC: 32.2 GM/DL — SIGNIFICANT CHANGE UP (ref 32–36)
MCV RBC AUTO: 96.8 FL — SIGNIFICANT CHANGE UP (ref 80–100)
MONOCYTES # BLD AUTO: 0.74 K/UL — SIGNIFICANT CHANGE UP (ref 0–0.9)
MONOCYTES NFR BLD AUTO: 8.5 % — SIGNIFICANT CHANGE UP (ref 2–14)
NEUTROPHILS # BLD AUTO: 6.88 K/UL — SIGNIFICANT CHANGE UP (ref 1.8–7.4)
NEUTROPHILS NFR BLD AUTO: 79.5 % — HIGH (ref 43–77)
PHOSPHATE SERPL-MCNC: 3.1 MG/DL — SIGNIFICANT CHANGE UP (ref 2.5–4.5)
PLATELET # BLD AUTO: 160 K/UL — SIGNIFICANT CHANGE UP (ref 150–400)
POTASSIUM SERPL-MCNC: 3.7 MMOL/L — SIGNIFICANT CHANGE UP (ref 3.5–5.3)
POTASSIUM SERPL-SCNC: 3.7 MMOL/L — SIGNIFICANT CHANGE UP (ref 3.5–5.3)
PROTHROM AB SERPL-ACNC: 31.7 SEC — HIGH (ref 10–12.9)
RBC # BLD: 2.85 M/UL — LOW (ref 4.2–5.8)
RBC # FLD: 15.2 % — HIGH (ref 10.3–14.5)
SODIUM SERPL-SCNC: 140 MMOL/L — SIGNIFICANT CHANGE UP (ref 135–145)
SPECIMEN SOURCE: SIGNIFICANT CHANGE UP
WBC # BLD: 8.66 K/UL — SIGNIFICANT CHANGE UP (ref 3.8–10.5)
WBC # FLD AUTO: 8.66 K/UL — SIGNIFICANT CHANGE UP (ref 3.8–10.5)

## 2019-09-10 PROCEDURE — 93970 EXTREMITY STUDY: CPT | Mod: 26

## 2019-09-10 RX ORDER — FUROSEMIDE 40 MG
40 TABLET ORAL DAILY
Refills: 0 | Status: DISCONTINUED | OUTPATIENT
Start: 2019-09-10 | End: 2019-09-12

## 2019-09-10 RX ORDER — WARFARIN SODIUM 2.5 MG/1
3 TABLET ORAL DAILY
Refills: 0 | Status: DISCONTINUED | OUTPATIENT
Start: 2019-09-10 | End: 2019-09-12

## 2019-09-10 RX ADMIN — AZITHROMYCIN 255 MILLIGRAM(S): 500 TABLET, FILM COATED ORAL at 21:49

## 2019-09-10 RX ADMIN — PANTOPRAZOLE SODIUM 40 MILLIGRAM(S): 20 TABLET, DELAYED RELEASE ORAL at 06:33

## 2019-09-10 RX ADMIN — Medication 12.5 MILLIGRAM(S): at 18:54

## 2019-09-10 RX ADMIN — Medication 650 MILLIGRAM(S): at 03:33

## 2019-09-10 RX ADMIN — Medication 650 MILLIGRAM(S): at 04:03

## 2019-09-10 RX ADMIN — Medication 3 MILLILITER(S): at 13:17

## 2019-09-10 RX ADMIN — ATORVASTATIN CALCIUM 40 MILLIGRAM(S): 80 TABLET, FILM COATED ORAL at 21:50

## 2019-09-10 RX ADMIN — CEFTRIAXONE 1000 MILLIGRAM(S): 500 INJECTION, POWDER, FOR SOLUTION INTRAMUSCULAR; INTRAVENOUS at 18:48

## 2019-09-10 RX ADMIN — Medication 3 MILLILITER(S): at 20:41

## 2019-09-10 RX ADMIN — Medication 1 TABLET(S): at 12:09

## 2019-09-10 RX ADMIN — WARFARIN SODIUM 3 MILLIGRAM(S): 2.5 TABLET ORAL at 21:50

## 2019-09-10 RX ADMIN — Medication 1 DROP(S): at 12:10

## 2019-09-10 RX ADMIN — SERTRALINE 25 MILLIGRAM(S): 25 TABLET, FILM COATED ORAL at 12:09

## 2019-09-10 RX ADMIN — Medication 3 MILLILITER(S): at 07:55

## 2019-09-10 RX ADMIN — FINASTERIDE 5 MILLIGRAM(S): 5 TABLET, FILM COATED ORAL at 12:09

## 2019-09-10 RX ADMIN — TAMSULOSIN HYDROCHLORIDE 0.4 MILLIGRAM(S): 0.4 CAPSULE ORAL at 21:50

## 2019-09-10 NOTE — PROVIDER CONTACT NOTE (OTHER) - REASON
Consultant Interval History: Remains altered and febrile. On very low dose levophed - endocrine suspicious of mild AI. Knee aspirated with GNR on gram stain with cx pending.    Review of Systems   Unable to perform ROS: Mental status change     Objective:     Vital Signs (Most Recent):  Temp: 98.8 °F (37.1 °C) (09/09/17 1515)  Pulse: (!) 118 (09/09/17 1515)  Resp: (!) 22 (09/09/17 1515)  BP: (!) 112/58 (09/09/17 1515)  SpO2: 100 % (09/09/17 1515) Vital Signs (24h Range):  Temp:  [98.8 °F (37.1 °C)-103.5 °F (39.7 °C)] 98.8 °F (37.1 °C)  Pulse:  [111-153] 118  Resp:  [20-51] 22  SpO2:  [99 %-100 %] 100 %  BP: ()/() 112/58  Arterial Line BP: ()/(41-75) 96/49     Weight: 67.4 kg (148 lb 9.4 oz)  Body mass index is 23.27 kg/m².    Estimated Creatinine Clearance: 54.1 mL/min (based on SCr of 1.8 mg/dL (H)).    Physical Exam   Eyes: No scleral icterus.   Neck: Neck supple.   Cardiovascular: Tachycardia present.    Murmur heard.   Systolic murmur is present   Pulmonary/Chest: No respiratory distress. He has decreased breath sounds in the right lower field and the left lower field. He has no wheezes. He has no rales.   Abdominal: Soft. Bowel sounds are normal. There is no tenderness.   Neurological: He is alert.   Orientated to person, month and state   Skin: Skin is warm and dry. He is not diaphoretic.   Papular lesions with exor   Nursing note and vitals reviewed.      Significant Labs:   CBC:   Recent Labs  Lab 09/07/17  2148 09/08/17  0241 09/08/17  0357 09/09/17  0321   WBC 1.33* 1.41*  --  7.44   HGB 14.9 14.8  --  12.3*   HCT 43.2 40.6  --  34.3*   PLT 73* SEE COMMENT 52* SEE COMMENT     CMP:   Recent Labs  Lab 09/07/17  2148 09/08/17  0241 09/08/17  1658 09/09/17  0321   * 128*  --  129*   K 3.7 3.9  --  3.5   CL 99 96  --  99   CO2 18* 15*  --  17*   GLU <30* 26* 71 72   BUN 31* 34*  --  37*   CREATININE 2.55* 2.2*  --  1.8*   CALCIUM 8.2* 8.1*  --  8.2*   PROT  --  8.1  --  6.5   ALBUMIN  --  1.8*  --   1.3*   BILITOT  --  1.1*  --  1.3*   ALKPHOS  --  32*  --  48*   AST  --  369*  --  319*   ALT  --  82*  --  93*   ANIONGAP 14 17*  --  13   EGFRNONAA 31.5* 37.7*  --  48.0*       Significant Imaging: I have reviewed all pertinent imaging results/findings within the past 24 hours.     Abdominal U/S:  Biliary sludge within the gallbladder lumen.  No definite secondary sonographic abnormalities to suggest acute cholecystitis.    Microbiology:  9/8 blood cx: negative x2  9/8 urine cx: negative  9/8 uGC negative  9/8 crypto ag negative  9/9 knee aspirate: gram stain with WBC and GNR

## 2019-09-10 NOTE — CONSULT NOTE ADULT - ASSESSMENT
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  Recommend diuresis with iv lasix.  Diuresis with close monitoring of the renal function and electrolytes.  Goal potassium of 4 and magnesium of 2.   Strict I/O and daily wt checks. Low sodium diet. Nutrition education.     HTN- continue home meds.    Other medical issues- Management per primary team.   Thank you for allowing me to participate in the care of this patient. Please feel free to contact me with any questions.
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
## 2019-09-11 LAB
ANION GAP SERPL CALC-SCNC: 11 MMOL/L — SIGNIFICANT CHANGE UP (ref 5–17)
BUN SERPL-MCNC: 40 MG/DL — HIGH (ref 7–23)
CALCIUM SERPL-MCNC: 8.5 MG/DL — SIGNIFICANT CHANGE UP (ref 8.5–10.1)
CHLORIDE SERPL-SCNC: 100 MMOL/L — SIGNIFICANT CHANGE UP (ref 96–108)
CO2 SERPL-SCNC: 26 MMOL/L — SIGNIFICANT CHANGE UP (ref 22–31)
CREAT SERPL-MCNC: 1.54 MG/DL — HIGH (ref 0.5–1.3)
GLUCOSE SERPL-MCNC: 93 MG/DL — SIGNIFICANT CHANGE UP (ref 70–99)
HCT VFR BLD CALC: 29.3 % — LOW (ref 39–50)
HGB BLD-MCNC: 9.5 G/DL — LOW (ref 13–17)
INR BLD: 2.16 RATIO — HIGH (ref 0.88–1.16)
MCHC RBC-ENTMCNC: 31 PG — SIGNIFICANT CHANGE UP (ref 27–34)
MCHC RBC-ENTMCNC: 32.4 GM/DL — SIGNIFICANT CHANGE UP (ref 32–36)
MCV RBC AUTO: 95.8 FL — SIGNIFICANT CHANGE UP (ref 80–100)
PLATELET # BLD AUTO: 187 K/UL — SIGNIFICANT CHANGE UP (ref 150–400)
POTASSIUM SERPL-MCNC: 3.5 MMOL/L — SIGNIFICANT CHANGE UP (ref 3.5–5.3)
POTASSIUM SERPL-SCNC: 3.5 MMOL/L — SIGNIFICANT CHANGE UP (ref 3.5–5.3)
PROTHROM AB SERPL-ACNC: 24.5 SEC — HIGH (ref 10–12.9)
RBC # BLD: 3.06 M/UL — LOW (ref 4.2–5.8)
RBC # FLD: 15.1 % — HIGH (ref 10.3–14.5)
SODIUM SERPL-SCNC: 137 MMOL/L — SIGNIFICANT CHANGE UP (ref 135–145)
WBC # BLD: 7.34 K/UL — SIGNIFICANT CHANGE UP (ref 3.8–10.5)
WBC # FLD AUTO: 7.34 K/UL — SIGNIFICANT CHANGE UP (ref 3.8–10.5)

## 2019-09-11 RX ADMIN — FINASTERIDE 5 MILLIGRAM(S): 5 TABLET, FILM COATED ORAL at 12:06

## 2019-09-11 RX ADMIN — Medication 1 DROP(S): at 12:06

## 2019-09-11 RX ADMIN — Medication 3 MILLILITER(S): at 19:55

## 2019-09-11 RX ADMIN — SERTRALINE 25 MILLIGRAM(S): 25 TABLET, FILM COATED ORAL at 12:06

## 2019-09-11 RX ADMIN — Medication 1 TABLET(S): at 12:06

## 2019-09-11 RX ADMIN — PANTOPRAZOLE SODIUM 40 MILLIGRAM(S): 20 TABLET, DELAYED RELEASE ORAL at 06:45

## 2019-09-11 RX ADMIN — AZITHROMYCIN 255 MILLIGRAM(S): 500 TABLET, FILM COATED ORAL at 21:15

## 2019-09-11 RX ADMIN — Medication 3 MILLILITER(S): at 07:36

## 2019-09-11 RX ADMIN — Medication 12.5 MILLIGRAM(S): at 21:15

## 2019-09-11 RX ADMIN — ATORVASTATIN CALCIUM 40 MILLIGRAM(S): 80 TABLET, FILM COATED ORAL at 21:15

## 2019-09-11 RX ADMIN — WARFARIN SODIUM 3 MILLIGRAM(S): 2.5 TABLET ORAL at 21:15

## 2019-09-11 RX ADMIN — Medication 5 MILLIGRAM(S): at 06:46

## 2019-09-11 RX ADMIN — TAMSULOSIN HYDROCHLORIDE 0.4 MILLIGRAM(S): 0.4 CAPSULE ORAL at 21:15

## 2019-09-11 RX ADMIN — Medication 40 MILLIGRAM(S): at 06:07

## 2019-09-11 RX ADMIN — Medication 3 MILLILITER(S): at 14:06

## 2019-09-11 RX ADMIN — CEFTRIAXONE 1000 MILLIGRAM(S): 500 INJECTION, POWDER, FOR SOLUTION INTRAMUSCULAR; INTRAVENOUS at 17:53

## 2019-09-11 NOTE — PROGRESS NOTE ADULT - ATTENDING COMMENTS
Patient seen and examined with Family Medicine Residents Pauline Day, Rhea Grier, Alexandrea Lopez, Carissa Morse and PA Student Mynor Noguera on the Family Medicine Teaching Service.  Agree with history, physical, labs and plan which were reviewed in detail after a face to face encounter with the patient.

## 2019-09-12 ENCOUNTER — TRANSCRIPTION ENCOUNTER (OUTPATIENT)
Age: 84
End: 2019-09-12

## 2019-09-12 VITALS — OXYGEN SATURATION: 97 %

## 2019-09-12 DIAGNOSIS — J84.112 IDIOPATHIC PULMONARY FIBROSIS: ICD-10-CM

## 2019-09-12 DIAGNOSIS — J84.9 INTERSTITIAL PULMONARY DISEASE, UNSPECIFIED: ICD-10-CM

## 2019-09-12 DIAGNOSIS — I27.23 PULMONARY HYPERTENSION DUE TO LUNG DISEASES AND HYPOXIA: ICD-10-CM

## 2019-09-12 DIAGNOSIS — J84.89 OTHER SPECIFIED INTERSTITIAL PULMONARY DISEASES: ICD-10-CM

## 2019-09-12 DIAGNOSIS — I50.9 HEART FAILURE, UNSPECIFIED: ICD-10-CM

## 2019-09-12 LAB
ANION GAP SERPL CALC-SCNC: 9 MMOL/L — SIGNIFICANT CHANGE UP (ref 5–17)
BUN SERPL-MCNC: 39 MG/DL — HIGH (ref 7–23)
CALCIUM SERPL-MCNC: 8.7 MG/DL — SIGNIFICANT CHANGE UP (ref 8.5–10.1)
CHLORIDE SERPL-SCNC: 100 MMOL/L — SIGNIFICANT CHANGE UP (ref 96–108)
CO2 SERPL-SCNC: 28 MMOL/L — SIGNIFICANT CHANGE UP (ref 22–31)
CREAT SERPL-MCNC: 1.55 MG/DL — HIGH (ref 0.5–1.3)
GLUCOSE SERPL-MCNC: 89 MG/DL — SIGNIFICANT CHANGE UP (ref 70–99)
INR BLD: 2.17 RATIO — HIGH (ref 0.88–1.16)
POTASSIUM SERPL-MCNC: 3.7 MMOL/L — SIGNIFICANT CHANGE UP (ref 3.5–5.3)
POTASSIUM SERPL-SCNC: 3.7 MMOL/L — SIGNIFICANT CHANGE UP (ref 3.5–5.3)
PROTHROM AB SERPL-ACNC: 24.7 SEC — HIGH (ref 10–12.9)
SODIUM SERPL-SCNC: 137 MMOL/L — SIGNIFICANT CHANGE UP (ref 135–145)

## 2019-09-12 PROCEDURE — 99222 1ST HOSP IP/OBS MODERATE 55: CPT

## 2019-09-12 RX ORDER — POTASSIUM CHLORIDE 20 MEQ
1 PACKET (EA) ORAL
Qty: 0 | Refills: 0 | DISCHARGE

## 2019-09-12 RX ADMIN — Medication 3 MILLILITER(S): at 20:06

## 2019-09-12 RX ADMIN — Medication 12.5 MILLIGRAM(S): at 06:22

## 2019-09-12 RX ADMIN — Medication 12.5 MILLIGRAM(S): at 17:08

## 2019-09-12 RX ADMIN — FINASTERIDE 5 MILLIGRAM(S): 5 TABLET, FILM COATED ORAL at 11:27

## 2019-09-12 RX ADMIN — Medication 1 DROP(S): at 11:27

## 2019-09-12 RX ADMIN — Medication 40 MILLIGRAM(S): at 06:23

## 2019-09-12 RX ADMIN — Medication 3 MILLILITER(S): at 13:31

## 2019-09-12 RX ADMIN — Medication 1 TABLET(S): at 11:27

## 2019-09-12 RX ADMIN — SERTRALINE 25 MILLIGRAM(S): 25 TABLET, FILM COATED ORAL at 11:27

## 2019-09-12 RX ADMIN — PANTOPRAZOLE SODIUM 40 MILLIGRAM(S): 20 TABLET, DELAYED RELEASE ORAL at 06:23

## 2019-09-12 RX ADMIN — CEFTRIAXONE 1000 MILLIGRAM(S): 500 INJECTION, POWDER, FOR SOLUTION INTRAMUSCULAR; INTRAVENOUS at 17:07

## 2019-09-12 NOTE — CONSULT NOTE ADULT - SUBJECTIVE AND OBJECTIVE BOX
HPI:  89 y/o male with a PMHx of BPH, CVA, CAD s/p PCI, HTN, Afib on coumadin, pacemaker presents to the ED c/o worsening cough and SOB, no sick contacts, rash,  HA, +congestion. Symptoms improve with rest and lying down. Pt also endorses leg swelling that improves when elevated but are more swollen than at baseline. Pt also c/o mid sternal chest pressure. No diaphoresis + chills no recent travel, HA, palpitations n/v/d. (09 Sep 2019 18:41)    History as above. Worked as a cabinet maked. Has remote smoking history.      PAST MEDICAL & SURGICAL HISTORY:  CVA (cerebrovascular accident)  BPH (benign prostatic hyperplasia)  HTN (hypertension)  Coronary artery disease  Pacemaker  S/P coronary artery stent placement  History of appendectomy      MEDICATIONS  (STANDING):  ALBUTerol/ipratropium for Nebulization 3 milliLiter(s) Nebulizer every 6 hours  atorvastatin 40 milliGRAM(s) Oral at bedtime  azithromycin  IVPB 500 milliGRAM(s) IV Intermittent every 24 hours  cefTRIAXone Injectable. 1000 milliGRAM(s) IV Push every 24 hours  finasteride 5 milliGRAM(s) Oral daily  furosemide   Injectable 40 milliGRAM(s) IV Push daily  metoprolol succinate ER 12.5 milliGRAM(s) Oral two times a day  multivitamin 1 Tablet(s) Oral daily  pantoprazole    Tablet 40 milliGRAM(s) Oral before breakfast  sertraline 25 milliGRAM(s) Oral daily  tamsulosin 0.4 milliGRAM(s) Oral at bedtime  timolol 0.5% Solution 1 Drop(s) Both EYES daily  warfarin 3 milliGRAM(s) Oral daily    MEDICATIONS  (PRN):  acetaminophen   Tablet .. 650 milliGRAM(s) Oral every 6 hours PRN Temp greater or equal to 38C (100.4F), Moderate Pain (4 - 6)  bisacodyl 5 milliGRAM(s) Oral daily PRN Constipation  ondansetron Injectable 4 milliGRAM(s) IV Push every 6 hours PRN Nausea      Allergies    No Known Allergies    Intolerances        SOCIAL HISTORY: Denies tobacco, etoh abuse or illicit drug use    FAMILY HISTORY:  Patient unable to provide medical history: patient does not known parents history      Vital Signs Last 24 Hrs  T(C): 36.8 (12 Sep 2019 05:59), Max: 36.9 (11 Sep 2019 11:52)  T(F): 98.3 (12 Sep 2019 05:59), Max: 98.5 (11 Sep 2019 11:52)  HR: 60 (12 Sep 2019 05:59) (60 - 61)  BP: 119/53 (12 Sep 2019 05:59) (103/40 - 130/43)  BP(mean): --  RR: 16 (12 Sep 2019 05:59) (16 - 17)  SpO2: 60% (12 Sep 2019 05:59) (60% - 98%)    REVIEW OF SYSTEMS:    CONSTITUTIONAL:  As per HPI.  SKIN: no rashes  HEENT:  Eyes:  No diplopia or blurred vision. ENT:  No earache, sore throat or runny nose.  CARDIOVASCULAR:  No pressure, squeezing, tightness, heaviness or aching about the chest, neck, axilla or epigastrium.  RESPIRATORY:  No cough, shortness of breath, PND or orthopnea.  GASTROINTESTINAL:  No nausea, vomiting or diarrhea.  GENITOURINARY:  No dysuria, frequency or urgency.  MUSCULOSKELETAL:  As per HPI.  SKIN:  No change in skin, hair or nails.  NEUROLOGIC:  No paresthesias, fasciculations, seizures or weakness.  PSYCHIATRIC:  No disorder of thought or mood.  ENDOCRINE:  No heat or cold intolerance, polyuria or polydipsia.  HEMATOLOGICAL:  No easy bruising or bleedings:  .     PHYSICAL EXAMINATION:    GENERAL APPEARANCE:  Pt. is not currently dyspneic, in no distress. Pt. is alert, oriented, and pleasant.  HEENT:  Pupils are normal and react normally. No icterus. Mucous membranes well colored.  NECK:  Supple. No lymphadenopathy. Jugular venous pressure not elevated. Carotids equal.   HEART:   S1S2 reg w 2/6 systolic murmur  CHEST: bibasilar crackles w decreased BS right base  ABDOMEN:  Soft and nontender.   EXTREMITIES:  There is no cyanosis, clubbing or edema.   SKIN:  No rash or significant lesions are noted.  CNS: no focal deficits    LABS:                        9.5    7.34  )-----------( 187      ( 11 Sep 2019 07:49 )             29.3     09-11    137  |  100  |  40<H>  ----------------------------<  93  3.5   |  26  |  1.54<H>    Ca    8.5      11 Sep 2019 07:49        PT/INR - ( 11 Sep 2019 07:49 )   PT: 24.5 sec;   INR: 2.16 ratio                     RADIOLOGY & ADDITIONAL STUDIES:

## 2019-09-12 NOTE — CONSULT NOTE ADULT - ASSESSMENT
- CT scan reviewed:  - has bilateral lower lobe fibrosis and honeycombing consistent with UIP, however, there is some upper lobe infiltrate/scarring which may be consistent with NSIP  - probably has pulmonary HTN  - small right effussion  - would be conservative  - dvt proph

## 2019-09-12 NOTE — DISCHARGE NOTE PROVIDER - CARE PROVIDER_API CALL
Radha Andrea)  Medicine  [address] [place] [address]  Phone: [phone]  Fax: [phone]  Follow Up Time: 1 week    Sg Washington)  Cardiovascular Disease; Nuclear Cardiology  [address] [place] [address]  Phone: [phone]  Fax: [phone]  Follow Up Time: 1 week

## 2019-09-12 NOTE — DISCHARGE NOTE PROVIDER - HOSPITAL COURSE
89 y/o male with a PMHx of BPH, CVA, CAD s/p PCI, HTN, Afib on coumadin, pacemaker presents to the ED c/o worsening cough and SOB due to pneumonia. The patient was started on ceftriaxone and azithromycin for community acquired pneumonia. The ct scan of the chest showed interstitial lung disease with possible superimposed pneumonia. Pulmonary evaluated the patient and recommended conservative mangement. The patient completed 4 days worth of antibiotics in the hospital and will be going home on ceftin 500mg twice a day for 4 more days. The patient is afebrile, shortness of breath has improved, and is stable for discharge. Spoke with  who states that both the daughter Maria Antonia and the patient are adamantly refusing subacute rehab as the patient got sick the last time he went to [facility] and wants the patient to go home with the assistance of the home aids.        s: spoke with patient in the morning and states that he wants us to discuss with his daughter about plan.     ID:[id number]        Vital Signs Last 24 Hrs    T(C): 36.6 (12 Sep 2019 10:44), Max: 36.9 (11 Sep 2019 19:50)    T(F): 97.9 (12 Sep 2019 10:44), Max: 98.5 (11 Sep 2019 19:50)    HR: 60 (12 Sep 2019 10:44) (60 - 61)    BP: 111/44 (12 Sep 2019 10:44) (111/44 - 130/43)    BP(mean): --    RR: 18 (12 Sep 2019 10:44) (16 - 18)    SpO2: 93% (12 Sep 2019 10:44) (60% - 97%)        PE    General NAD awake and alert    cardio s1 s2 rrr no murmurs gallops or rubs    resp crackles on the right, no wheezing rales, on rhonchi     abdomen soft nt nd    ext pitting edema of the ankles

## 2019-09-12 NOTE — PROGRESS NOTE ADULT - SUBJECTIVE AND OBJECTIVE BOX
CHIEF COMPLAINT:    SUBJECTIVE:     89 y/o male with a PMHx of BPH, CVA, CAD s/p PCI, HTN, Afib on coumadin, pacemaker presents to the ED c/o worsening cough and SOB, no sick contacts, rash,  HA, +congestion. Symptoms improve with rest and lying down. Pt also endorses leg swelling that improves when elevated but are more swollen than at baseline. Pt also c/o mid sternal chest pressure. No diaphoresis + chills no recent travel, HA, palpitations n/v/d.     9/11 pt seen and examined at bedside today. PT states that he is bringing up some dark sputum that looks like "smoke". PT states he feels "so so" and has no complaints at this time. PT is aware that he has children in [place] and children here. Spoke with the daughter who states that if things go wrong they want everything done for her father. Daughter is health care proxy. As per daughter the patient is full code. Spoke with DR. Ying who is out sick today and states he did not receive the consult but will go to see the patient tomorrow.    REVIEW OF SYSTEMS:  CONSTITUTIONAL: No weakness, fevers or chills  EYES/ENT: No visual changes;  No vertigo or throat pain   NECK: No pain or stiffness  RESPIRATORY: + productive cough, no wheezing, hemoptysis; No shortness of breath  CARDIOVASCULAR: No chest pain or palpitations  GASTROINTESTINAL: No abdominal or epigastric pain. No nausea, vomiting, or hematemesis; No diarrhea or constipation. No melena or hematochezia.  GENITOURINARY: No dysuria, frequency or hematuria  NEUROLOGICAL: No numbness or weakness  SKIN: No itching, burning, rashes, or lesions   All other review of systems is negative unless indicated above    Vital Signs Last 24 Hrs  T(C): 36.9 (11 Sep 2019 11:52), Max: 36.9 (11 Sep 2019 01:07)  T(F): 98.5 (11 Sep 2019 11:52), Max: 98.5 (11 Sep 2019 01:07)  HR: 60 (11 Sep 2019 11:52) (59 - 60)  BP: 103/40 (11 Sep 2019 11:52) (103/40 - 129/45)  BP(mean): --  RR: 17 (11 Sep 2019 11:52) (17 - 18)  SpO2: 98% (11 Sep 2019 11:52) (92% - 98%)    I&O's Summary      CAPILLARY BLOOD GLUCOSE          PHYSICAL EXAM:  Constitutional: NAD, awake and alert, well-developed  HEENT: PERR, EOMI, Normal Hearing, MMM  Neck: Soft and supple, No LAD, No JVD  Respiratory: crackles on the right, No wheezing, rales or rhonchi  Cardiovascular: S1 and S2, regular rate and rhythm, no Murmurs, gallops or rubs  Gastrointestinal: Bowel Sounds present, soft, nontender, nondistended, no guarding, no rebound  Extremities: No peripheral edema  Vascular: 2+ peripheral pulses  Neurological: A/O x 2(self and time), no focal deficits  Skin: No visible rashes    MEDICATIONS:  MEDICATIONS  (STANDING):  ALBUTerol/ipratropium for Nebulization 3 milliLiter(s) Nebulizer every 6 hours  atorvastatin 40 milliGRAM(s) Oral at bedtime  azithromycin  IVPB 500 milliGRAM(s) IV Intermittent every 24 hours  cefTRIAXone Injectable. 1000 milliGRAM(s) IV Push every 24 hours  finasteride 5 milliGRAM(s) Oral daily  furosemide   Injectable 40 milliGRAM(s) IV Push daily  metoprolol succinate ER 12.5 milliGRAM(s) Oral two times a day  multivitamin 1 Tablet(s) Oral daily  pantoprazole    Tablet 40 milliGRAM(s) Oral before breakfast  sertraline 25 milliGRAM(s) Oral daily  tamsulosin 0.4 milliGRAM(s) Oral at bedtime  timolol 0.5% Solution 1 Drop(s) Both EYES daily  warfarin 3 milliGRAM(s) Oral daily      LABS: All Labs Reviewed:                        9.5    7.34  )-----------( 187      ( 11 Sep 2019 07:49 )             29.3     09-11    137  |  100  |  40<H>  ----------------------------<  93  3.5   |  26  |  1.54<H>    Ca    8.5      11 Sep 2019 07:49  Phos  3.1     09-10  Mg     2.2     09-10    TPro  7.5  /  Alb  3.2<L>  /  TBili  1.6<H>  /  DBili  x   /  AST  23  /  ALT  26  /  AlkPhos  97  09-09    PT/INR - ( 11 Sep 2019 07:49 )   PT: 24.5 sec;   INR: 2.16 ratio         PTT - ( 09 Sep 2019 15:34 )  PTT:42.5 sec  CARDIAC MARKERS ( 09 Sep 2019 21:59 )  <0.015 ng/mL / x     / x     / x     / x      CARDIAC MARKERS ( 09 Sep 2019 18:54 )  <0.015 ng/mL / x     / x     / x     / x      CARDIAC MARKERS ( 09 Sep 2019 15:34 )  <0.015 ng/mL / x     / x     / x     / x          Blood Culture: 09-09 @ 19:19  Organism --  Gram Stain Blood -- Gram Stain --  Specimen Source .Urine None  Culture-Blood --    09-09 @ 15:34  Organism --  Gram Stain Blood -- Gram Stain --  Specimen Source .Blood None  Culture-Blood --        RADIOLOGY/EKG:  < from: CT Chest No Cont (09.09.19 @ 17:32) >  IMPRESSION:     Pulmonary findings compatible with interstitial lung disease.    Focal patchy opacities in both upper lobes may represent superimposed   pneumonia. Clinical correlation is recommended.    Other incidental findings are as above.    < end of copied text >      < from: US Duplex Venous Lower Ext Complete, Bilateral (09.10.19 @ 08:37) >  IMPRESSION:     No evidence of deep venous thrombosis in either lower extremity.    DVT PPX:    ADVANCED DIRECTIVE:    DISPOSITION:
Pt has been seen and examined with FP resident, resident supervised agree with a/p       Patient is a 89y old  Male who presents with a chief complaint of shortness of breath         HPI:  89 y/o male with a PMHx of BPH, CVA, CAD s/p PCI, HTN, Afib on coumadin, pacemaker presents to the ED c/o worsening cough and SOB, no sick contacts, rash,  HA, +congestion. Symptoms improve with rest and lying down. Pt also endorses leg swelling that improves when elevated but are more swollen than at baseline. Pt also c/o mid sternal chest pressure. No diaphoresis + chills no recent travel, HA, palpitations n/v/d. (09 Sep 2019 18:41)        PHYSICAL EXAM:  Vital Signs Last 24 Hrs  T(C): 36.2 (10 Sep 2019 10:50), Max: 38.2 (09 Sep 2019 15:59)  T(F): 97.2 (10 Sep 2019 10:50), Max: 100.7 (09 Sep 2019 15:59)  HR: 66 (10 Sep 2019 13:19) (59 - 69)  BP: 107/45 (10 Sep 2019 10:50) (99/41 - 136/40)  BP(mean): 50 (10 Sep 2019 06:02) (50 - 50)  RR: 18 (10 Sep 2019 10:50) (16 - 20)  SpO2: 92% (10 Sep 2019 10:50) (91% - 99%)  general- comfortable   -rs-aeeb,coarse crackles present   -cvs-s1s2 normal   -p/a-soft,bs+  -extremity- no asymmetrical swelling noted   -cns- non focal         A/P    #probably pneumonia with underlying interstitial lung disease or worsening of ILD   -ct abx, supportive care   -gentle diuresis to see if his symptoms improves for possible heart failure -does not appears hypervolumic   -echo to follow     #DVT pr
CHIEF COMPLAINT:    SUBJECTIVE:   HPI:  89 y/o male with a PMHx of BPH, CVA, CAD s/p PCI, HTN, Afib on coumadin, pacemaker presents to the ED c/o worsening cough and SOB, no sick contacts, rash,  HA, +congestion. Symptoms improve with rest and lying down. Pt also endorses leg swelling that improves when elevated but are more swollen than at baseline. Pt also c/o mid sternal chest pressure. No diaphoresis + chills no recent travel, HA, palpitations n/v/d.     9/10 Pt seen and examined at bedside this morning. As per pt he has some sob but also thinks that he is in a [place] so review of systems may not be reliable. Pt states he feels "so so". Pt has no other complaints    REVIEW OF SYSTEMS:  +sob, cough, and weakness  may not be reliable as pt is only alert to self.    All other review of systems is negative unless indicated above    Vital Signs Last 24 Hrs  T(C): 36.2 (10 Sep 2019 10:50), Max: 38.2 (09 Sep 2019 15:59)  T(F): 97.2 (10 Sep 2019 10:50), Max: 100.7 (09 Sep 2019 15:59)  HR: 66 (10 Sep 2019 13:19) (59 - 69)  BP: 107/45 (10 Sep 2019 10:50) (99/41 - 137/44)  BP(mean): 50 (10 Sep 2019 06:02) (50 - 50)  RR: 18 (10 Sep 2019 10:50) (16 - 20)  SpO2: 92% (10 Sep 2019 10:50) (91% - 100%)    I&O's Summary    09 Sep 2019 07:01  -  10 Sep 2019 07:00  --------------------------------------------------------  IN: 0 mL / OUT: 500 mL / NET: -500 mL        CAPILLARY BLOOD GLUCOSE          PHYSICAL EXAM:  Constitutional: NAD, awake and alert, well-developed  HEENT: PERR, EOMI, Normal Hearing, MMM  Neck: Soft and supple, No LAD, No JVD  Respiratory: No wheezing,+ rales or no  rhonchi  Cardiovascular: S1 and S2, regular rate and rhythm, no Murmurs, gallops or rubs  Gastrointestinal: Bowel Sounds present, soft, nontender, nondistended, no guarding, no rebound  Extremities: minimal pitting edema at ankles  Vascular: 2+ peripheral pulses  Neurological: A/O x 1 to self, no focal deficits  Musculoskeletal: 5/5 strength b/l upper and lower extremities  Skin: No rashes    MEDICATIONS:  MEDICATIONS  (STANDING):  ALBUTerol/ipratropium for Nebulization 3 milliLiter(s) Nebulizer every 6 hours  atorvastatin 40 milliGRAM(s) Oral at bedtime  azithromycin  IVPB 500 milliGRAM(s) IV Intermittent every 24 hours  cefTRIAXone Injectable. 1000 milliGRAM(s) IV Push every 24 hours  finasteride 5 milliGRAM(s) Oral daily  furosemide   Injectable 40 milliGRAM(s) IV Push two times a day  metoprolol succinate ER 12.5 milliGRAM(s) Oral two times a day  multivitamin 1 Tablet(s) Oral daily  pantoprazole    Tablet 40 milliGRAM(s) Oral before breakfast  sertraline 25 milliGRAM(s) Oral daily  tamsulosin 0.4 milliGRAM(s) Oral at bedtime  timolol 0.5% Solution 1 Drop(s) Both EYES daily  warfarin 3 milliGRAM(s) Oral daily      LABS: All Labs Reviewed:                        8.9    8.66  )-----------( 160      ( 10 Sep 2019 08:05 )             27.6     09-10    140  |  101  |  42<H>  ----------------------------<  92  3.7   |  30  |  1.81<H>    Ca    8.6      10 Sep 2019 08:05  Phos  3.1     09-10  Mg     2.2     09-10    TPro  7.5  /  Alb  3.2<L>  /  TBili  1.6<H>  /  DBili  x   /  AST  23  /  ALT  26  /  AlkPhos  97  09-09    PT/INR - ( 10 Sep 2019 08:05 )   PT: 31.7 sec;   INR: 2.77 ratio         PTT - ( 09 Sep 2019 15:34 )  PTT:42.5 sec  CARDIAC MARKERS ( 09 Sep 2019 21:59 )  <0.015 ng/mL / x     / x     / x     / x      CARDIAC MARKERS ( 09 Sep 2019 18:54 )  <0.015 ng/mL / x     / x     / x     / x      CARDIAC MARKERS ( 09 Sep 2019 15:34 )  <0.015 ng/mL / x     / x     / x     / x          Blood Culture:     RADIOLOGY/EKG:  < from: CT Chest No Cont (09.09.19 @ 17:32) >  IMPRESSION:     Pulmonary findings compatible with interstitial lung disease.    Focal patchy opacities in both upper lobes may represent superimposed   pneumonia. Clinical correlation is recommended.    Other incidental findings are as above.    < end of copied text >      < from: US Duplex Venous Lower Ext Complete, Bilateral (09.10.19 @ 08:37) >  IMPRESSION:     No evidence of deep venous thrombosis in either lower extremity.        DVT PPX:    ADVANCED DIRECTIVE: Will speak to family about DNR/DNI    DISPOSITION:
Patient is a 89y old  Male who presents with a chief complaint of CAP, CHF       HPI:  89 y/o male with a PMHx of BPH, CVA, CAD s/p PCI, HTN, Afib on coumadin, pacemaker presents to the ED c/o worsening cough and SOB, no sick contacts, rash,  HA, +congestion. Symptoms improve with rest and lying down. Pt also endorses leg swelling that improves when elevated but are more swollen than at baseline. Pt also c/o mid sternal chest pressure. No diaphoresis + chills no recent travel, HA, palpitations n/v/d.    Pt recently had some insurance change and did not have aide for 1.5months and during that time he burnt his hands with clorox and was admitted to [facility] and discharge on lasix 80mg po daily.  I saw him in the office on the day of admission and he states he was not feeling good but was unable to describe fully.  Pt thought lasix was making him have abdominal pain.    9/12- pt seen and examined by me today. Pt denies any CP or SOB.        PAST MEDICAL & SURGICAL HISTORY:  CVA (cerebrovascular accident)  BPH (benign prostatic hyperplasia)  HTN (hypertension)  Coronary artery disease  Pacemaker  S/P coronary artery stent placement  History of appendectomy      MEDICATIONS  (STANDING):  ALBUTerol/ipratropium for Nebulization 3 milliLiter(s) Nebulizer every 6 hours  atorvastatin 40 milliGRAM(s) Oral at bedtime  azithromycin  IVPB 500 milliGRAM(s) IV Intermittent every 24 hours  cefTRIAXone Injectable. 1000 milliGRAM(s) IV Push every 24 hours  finasteride 5 milliGRAM(s) Oral daily  furosemide   Injectable 40 milliGRAM(s) IV Push daily  metoprolol succinate ER 12.5 milliGRAM(s) Oral two times a day  multivitamin 1 Tablet(s) Oral daily  pantoprazole    Tablet 40 milliGRAM(s) Oral before breakfast  sertraline 25 milliGRAM(s) Oral daily  tamsulosin 0.4 milliGRAM(s) Oral at bedtime  timolol 0.5% Solution 1 Drop(s) Both EYES daily  warfarin 3 milliGRAM(s) Oral daily    MEDICATIONS  (PRN):  acetaminophen   Tablet .. 650 milliGRAM(s) Oral every 6 hours PRN Temp greater or equal to 38C (100.4F), Moderate Pain (4 - 6)  bisacodyl 5 milliGRAM(s) Oral daily PRN Constipation  ondansetron Injectable 4 milliGRAM(s) IV Push every 6 hours PRN Nausea      FAMILY HISTORY:  Patient unable to provide medical history: patient does not known parents history      SOCIAL HISTORY:  lives alone. no smoking recently     REVIEW OF SYSTEMS:  CONSTITUTIONAL:    No fatigue, malaise, lethargy.  No fever or chills.  RESPIRATORY:  No cough.  No wheeze.  No hemoptysis.  no shortness of breath.  CARDIOVASCULAR:  No chest pains.  No palpitations. no shortness of breath, No orthopnea or PND.  GASTROINTESTINAL:  c/o abdominal pain.  No nausea or vomiting.    GENITOURINARY:    No hematuria.    MUSCULOSKELETAL:  No musculoskeletal pain.  No joint swelling.  No arthritis.  NEUROLOGICAL:  No tingling or numbness or weakness.  PSYCHIATRIC:  No confusion    ICU Vital Signs Last 24 Hrs  T(C): 36.8 (12 Sep 2019 05:59), Max: 36.9 (11 Sep 2019 11:52)  T(F): 98.3 (12 Sep 2019 05:59), Max: 98.5 (11 Sep 2019 11:52)  HR: 60 (12 Sep 2019 05:59) (60 - 61)  BP: 119/53 (12 Sep 2019 05:59) (103/40 - 130/43)  BP(mean): --  ABP: --  ABP(mean): --  RR: 16 (12 Sep 2019 05:59) (16 - 17)  SpO2: 60% (12 Sep 2019 05:59) (60% - 98%)      PHYSICAL EXAM-    Constitutional: elderly male in no acute distress    Head: Head is normocephalic and atraumatic.      Neck: No JVD.     Cardiovascular: irregular rate and rhythm without S3, S4. No murmurs or rubs are appreciated.      Respiratory: rales at bases b/l     Abdomen: Soft, nontender, nondistended with positive bowel sounds.      Extremity: No tenderness. 2+ ankle   pitting edema     Neurologic: The patient is alert and oriented.      Skin: No rash, no obvious lesions noted.      Psychiatric: The patient appears to be emotionally stable.      INTERPRETATION OF TELEMETRY:  afib v paced    ECG: v paced ramu    I&O's Detail    11 Sep 2019 07:01  -  12 Sep 2019 07:00  --------------------------------------------------------  IN:  Total IN: 0 mL    OUT:    Voided: 250 mL  Total OUT: 250 mL    Total NET: -250 mL                                9.5    7.34  )-----------( 187      ( 11 Sep 2019 07:49 )             29.3     09-11    137  |  100  |  40<H>  ----------------------------<  93  3.5   |  26  |  1.54<H>    Ca    8.5      11 Sep 2019 07:49            PT/INR - ( 11 Sep 2019 07:49 )   PT: 24.5 sec;   INR: 2.16 ratio               I&O's Summary    11 Sep 2019 07:01  -  12 Sep 2019 07:00  --------------------------------------------------------  IN: 0 mL / OUT: 250 mL / NET: -250 mL      BNP  RADIOLOGY & ADDITIONAL STUDIES:  < from: CT Chest No Cont (09.09.19 @ 17:32) >  IMPRESSION:     Pulmonary findings compatible with interstitial lung disease.    Focal patchy opacities in both upper lobes may represent superimposed   pneumonia. Clinical correlation is recommended.    Other incidental findings are as above.          AMAIRANI CESPEDES M.D. ATTENDING RADIOLOGIST  This document has been electronically signed. Sep  9 2019  5:54PM    < end of copied text >

## 2019-09-12 NOTE — DISCHARGE NOTE PROVIDER - PROVIDER TOKENS
PROVIDER:[TOKEN:[[id number]],FOLLOWUP:[1 week]],PROVIDER:[TOKEN:[[id number]],FOLLOWUP:[1 week]]

## 2019-09-12 NOTE — DISCHARGE NOTE PROVIDER - NSDCCPCAREPLAN_GEN_ALL_CORE_FT
PRINCIPAL DISCHARGE DIAGNOSIS  Diagnosis: Shortness of breath  Assessment and Plan of Treatment: Shortness of breath due to pneumonia  Patient completed a course of azithromycin and will complete 4 more days of ceftin 500mg twice a day.  If you experience worsening shortness of breath, fevers, or chills then return to the ED for evaluation  Follow up with your primary care physician for further management      SECONDARY DISCHARGE DIAGNOSES  Diagnosis: CAP (community acquired pneumonia)  Assessment and Plan of Treatment: Patient completed a course of azithromycin and will complete 4 more days of ceftin 500mg twice a day.  If you experience worsening shortness of breath, fevers, or chills then return to the ED for evaluation  Follow up with your primary care physician for further management    Diagnosis: Congestive heart failure, unspecified HF chronicity, unspecified heart failure type  Assessment and Plan of Treatment: Congestive heart failure, unspecified HF chronicity, unspecified heart failure type  continue home dose of lasix 80mg daily  monitor kidney function while on the lasix   follow up outpatient with your cardiologist Dr. Washington for further management    Diagnosis: Coronary artery disease  Assessment and Plan of Treatment: continue atorvastatin 40mg daily  follow up with your cardiologist for further management    Diagnosis: BPH (benign prostatic hyperplasia)  Assessment and Plan of Treatment: continue finasteride and tamsulosin and follow up with your primary care physcian for further management    Diagnosis: Atrial fibrillation  Assessment and Plan of Treatment: continue 3mg of coumadin and follow up with your primary care physician within 3 days to repeat the INR and adjust the medications accordingly  Conitnue the metoprolol for the rate control    Diagnosis: Hypertension  Assessment and Plan of Treatment: stable  continue with the 12.5 mg twice a day and follow up with your primary care physcian with in 1 week  Maintain a low salt diet

## 2019-09-12 NOTE — DISCHARGE NOTE NURSING/CASE MANAGEMENT/SOCIAL WORK - PATIENT PORTAL LINK FT
You can access the FollowMyHealth Patient Portal offered by [facility] by registering at the following website: [email or web address]. By joining [facility]’s FollowMyHealth portal, you will also be able to view your health information using other applications (apps) compatible with our system.

## 2019-09-12 NOTE — PHYSICAL THERAPY INITIAL EVALUATION ADULT - PERTINENT HX OF CURRENT PROBLEM, REHAB EVAL
Pt admitted to  secondary to cough, SOB, and bilateral LE swelling. Pt with recent admission to [facility] secondary to burning hands with clorox. INR- 2.17.

## 2019-09-12 NOTE — PHYSICAL THERAPY INITIAL EVALUATION ADULT - ACTIVE RANGE OF MOTION EXAMINATION, REHAB EVAL
Left LE Active ROM was WFL (within functional limits)/Bilateral shoulder flex ~ 140 degrees, bilateral hip flex ~ 90 degrees, and bilateral DF neutral./Left UE Active ROM was WFL (within functional limits)/Right LE Active ROM was WFL (within functional limits)/Right UE Active ROM was WFL (within functional limits)

## 2019-09-13 RX ORDER — CEFUROXIME AXETIL 250 MG
1 TABLET ORAL
Qty: 10 | Refills: 0
Start: 2019-09-13 | End: 2019-09-17

## 2019-09-13 RX ORDER — CEFUROXIME AXETIL 250 MG
1 TABLET ORAL
Qty: 8 | Refills: 0
Start: 2019-09-13 | End: 2019-09-16

## 2019-09-14 LAB
CULTURE RESULTS: SIGNIFICANT CHANGE UP
CULTURE RESULTS: SIGNIFICANT CHANGE UP
SPECIMEN SOURCE: SIGNIFICANT CHANGE UP
SPECIMEN SOURCE: SIGNIFICANT CHANGE UP

## 2019-09-17 NOTE — CDI QUERY NOTE - NSCDIOTHERTXTBX_GEN_ALL_CORE_HH
This patient is documented with JACQUELIN/acute renal failure.    DOCUMENTATION:      Creatinine Trend:  Creatinine, Serum: 1.55 mg/dL <H> [0.50 - 1.30] (09-12-19)  Creatinine, Serum: 1.54 mg/dL <H> [0.50 - 1.30] (09-11-19)  Creatinine, Serum: 1.81 mg/dL <H> [0.50 - 1.30] (09-10-19)  Creatinine, Serum: 1.83 mg/dL <H> [0.50 - 1.30] (09-09-19)         [facility] policy based on KDIGO guidelines defines JACQUELIN (applicable to both adult and pediatric patients) as any of the following;  •	Increase Creatinine = 0.3 mg/dl from baseline within 48 hours; or  •	Increase in Creatinine level to = 1.5x baseline, which is known or presumed to have occurred within the prior 7 days; or      According to clinical guidelines, clinical criteria must support documented clinical diagnoses.    Can you please clarify the clinical indicators supporting the diagnosis of JACQUELIN or clarify that JACQUELIN has been ruled out?

## 2019-09-25 NOTE — ED PROVIDER NOTE - CROS ED ROS STATEMENT
Puckett  PPM interrogation- in chart, cardiologist to confirm  f/u B12(wnl), HBA1c(7.5), TSH all other ROS negative except as per HPI

## 2019-10-03 NOTE — CHART NOTE - NSCHARTNOTEFT_GEN_A_CORE
Patient with JACQUELIN based on more than 0.3 increase in creatinine from baseline.     Comprehensive Metabolic Panel (05.05.19 @ 18:28)    Creatinine, Serum: 1.19 mg/dL  Basic Metabolic Panel in AM (05.08.19 @ 06:02)    Creatinine, Serum: 1.50 mg/dL  Comprehensive Metabolic Panel (09.09.19 @ 15:34)    Creatinine, Serum: 1.83 mg/dL

## 2019-10-05 NOTE — PROGRESS NOTE ADULT - ASSESSMENT
Pt sitting bed, appears to be RIS  Looking around room and speaking to self  Pt denies any needs at this time  Reports to writer she ate a pack of tracee crackers 
89 y/o male with a PMHx of BPH, CVA, CAD s/p PCI, HTN, Afib on coumadin, CHF, pacemaker presents to the ED c/o worsening cough, weakness, congestion, leg swelling and SOB likely due to pneumonia    Dyspnia 2/2 interstitial lung disease due to community acquired pneumonia  -CT chest showing interstitial lung disease  -continue azithromycin and ceftriaxone D3  -follow up pulmonary consult for recommendations for possible steroids    chronic diastolic CHF  -Last echo revealed tricuspid regurgitation, pulmonary HTN and an ejection fraction of 65%  -no orthopnea, no jvd and no PND, less likely to be in exacerbation  -DASH diet  -continue lasix 40 BID  -f/u cardio consult    Afib   -coumadin initially held because supratherapeutic  -give 3mg of coumadin tonight  - continue metoprolol 12.5 bid for rate control    CAD  -continue atorvastatin 40 qhs    BPH  continue tamsulosin and finasteride    DVT ppx: on coumadin    Above discussed w/ Dr. Sanchez
89 y/o male with a PMHx of BPH, CVA, CAD s/p PCI, HTN, Afib on coumadin, CHF, pacemaker presents to the ED c/o worsening cough, weakness, congestion, leg swelling and SOB likely due to pneumonia    Dyspnia 2/2 interstitial lung disease due to community acquired pneumonia  -CT chest showing interstitial lung disease  -continue azithromycin and ceftriaxone D2  -follow up pulmonary consult for recommendations for possible steroids    chronic diastolic CHF  -Last echo revealed tricuspid regurgitation, pulmonary HTN and an ejection fraction of 65%  -no orthopnea, no jvd and no PND, less likely to be in exacerbation  -DASH diet  -continue lasix 40 BID  -cardio consult appreciated    Afib   -coumadin initially held because supratherapeutic  -give 3mg of coumadin tonight  - continue metoprolol 12.5 bid for rate control    CAD  -continue atorvastatin 40 qhs    BPH  continue tamsulosin and finasteride    DVT ppx: on coumadin    Above discussed w/ Dr. Cornell
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  Recommend continue diuresis with iv lasix.  Diuresis with close monitoring of the renal function and electrolytes.  Goal potassium of 4 and magnesium of 2.   Strict I/O and daily wt checks. Low sodium diet. Nutrition education.     HTN- continue home meds.    Other medical issues- Management per primary team.   Thank you for allowing me to participate in the care of this patient. Please feel free to contact me with any questions.

## 2020-01-01 ENCOUNTER — APPOINTMENT (OUTPATIENT)
Dept: HOME HEALTH SERVICES | Facility: HOME HEALTH | Age: 85
End: 2020-01-01
Payer: MEDICARE

## 2020-01-01 ENCOUNTER — NON-APPOINTMENT (OUTPATIENT)
Age: 85
End: 2020-01-01

## 2020-01-01 ENCOUNTER — APPOINTMENT (OUTPATIENT)
Dept: CARE COORDINATION | Facility: HOME HEALTH | Age: 85
End: 2020-01-01
Payer: MEDICARE

## 2020-01-01 ENCOUNTER — TRANSCRIPTION ENCOUNTER (OUTPATIENT)
Age: 85
End: 2020-01-01

## 2020-01-01 ENCOUNTER — INPATIENT (INPATIENT)
Facility: HOSPITAL | Age: 85
LOS: 3 days | Discharge: HOME HEALTH | DRG: 291 | End: 2020-10-08
Attending: HOSPITALIST | Admitting: HOSPITALIST
Payer: MEDICARE

## 2020-01-01 VITALS
SYSTOLIC BLOOD PRESSURE: 100 MMHG | TEMPERATURE: 97.2 F | DIASTOLIC BLOOD PRESSURE: 60 MMHG | RESPIRATION RATE: 18 BRPM | OXYGEN SATURATION: 94 % | HEIGHT: 70 IN | WEIGHT: 146 LBS | BODY MASS INDEX: 20.9 KG/M2 | HEART RATE: 61 BPM

## 2020-01-01 VITALS
HEART RATE: 72 BPM | SYSTOLIC BLOOD PRESSURE: 134 MMHG | OXYGEN SATURATION: 94 % | RESPIRATION RATE: 18 BRPM | DIASTOLIC BLOOD PRESSURE: 80 MMHG

## 2020-01-01 VITALS — HEIGHT: 78 IN | WEIGHT: 162.04 LBS

## 2020-01-01 VITALS
TEMPERATURE: 98 F | OXYGEN SATURATION: 98 % | HEART RATE: 60 BPM | SYSTOLIC BLOOD PRESSURE: 96 MMHG | DIASTOLIC BLOOD PRESSURE: 59 MMHG | RESPIRATION RATE: 18 BRPM

## 2020-01-01 DIAGNOSIS — Z79.01 LONG TERM (CURRENT) USE OF ANTICOAGULANTS: ICD-10-CM

## 2020-01-01 DIAGNOSIS — Z90.49 ACQUIRED ABSENCE OF OTHER SPECIFIED PARTS OF DIGESTIVE TRACT: Chronic | ICD-10-CM

## 2020-01-01 DIAGNOSIS — N18.4 CHRONIC KIDNEY DISEASE, STAGE 4 (SEVERE): ICD-10-CM

## 2020-01-01 DIAGNOSIS — F32.9 MAJOR DEPRESSIVE DISORDER, SINGLE EPISODE, UNSPECIFIED: ICD-10-CM

## 2020-01-01 DIAGNOSIS — E87.70 FLUID OVERLOAD, UNSPECIFIED: ICD-10-CM

## 2020-01-01 DIAGNOSIS — I25.10 ATHEROSCLEROTIC HEART DISEASE OF NATIVE CORONARY ARTERY WITHOUT ANGINA PECTORIS: ICD-10-CM

## 2020-01-01 DIAGNOSIS — I13.0 HYPERTENSIVE HEART AND CHRONIC KIDNEY DISEASE WITH HEART FAILURE AND STAGE 1 THROUGH STAGE 4 CHRONIC KIDNEY DISEASE, OR UNSPECIFIED CHRONIC KIDNEY DISEASE: ICD-10-CM

## 2020-01-01 DIAGNOSIS — Z86.73 PERSONAL HISTORY OF TRANSIENT ISCHEMIC ATTACK (TIA), AND CEREBRAL INFARCTION WITHOUT RESIDUAL DEFICITS: ICD-10-CM

## 2020-01-01 DIAGNOSIS — Z95.5 PRESENCE OF CORONARY ANGIOPLASTY IMPLANT AND GRAFT: ICD-10-CM

## 2020-01-01 DIAGNOSIS — R63.4 ABNORMAL WEIGHT LOSS: ICD-10-CM

## 2020-01-01 DIAGNOSIS — Z95.0 PRESENCE OF CARDIAC PACEMAKER: ICD-10-CM

## 2020-01-01 DIAGNOSIS — J96.00 ACUTE RESPIRATORY FAILURE, UNSPECIFIED WHETHER WITH HYPOXIA OR HYPERCAPNIA: ICD-10-CM

## 2020-01-01 DIAGNOSIS — Z23 ENCOUNTER FOR IMMUNIZATION: ICD-10-CM

## 2020-01-01 DIAGNOSIS — I10 ESSENTIAL (PRIMARY) HYPERTENSION: ICD-10-CM

## 2020-01-01 DIAGNOSIS — N40.0 BENIGN PROSTATIC HYPERPLASIA WITHOUT LOWER URINARY TRACT SYMPTOMS: ICD-10-CM

## 2020-01-01 DIAGNOSIS — I48.91 UNSPECIFIED ATRIAL FIBRILLATION: ICD-10-CM

## 2020-01-01 DIAGNOSIS — N17.9 ACUTE KIDNEY FAILURE, UNSPECIFIED: ICD-10-CM

## 2020-01-01 DIAGNOSIS — I50.33 ACUTE ON CHRONIC DIASTOLIC (CONGESTIVE) HEART FAILURE: ICD-10-CM

## 2020-01-01 DIAGNOSIS — Z95.5 PRESENCE OF CORONARY ANGIOPLASTY IMPLANT AND GRAFT: Chronic | ICD-10-CM

## 2020-01-01 DIAGNOSIS — I50.9 HEART FAILURE, UNSPECIFIED: ICD-10-CM

## 2020-01-01 DIAGNOSIS — Z78.9 OTHER SPECIFIED HEALTH STATUS: ICD-10-CM

## 2020-01-01 DIAGNOSIS — D64.9 ANEMIA, UNSPECIFIED: ICD-10-CM

## 2020-01-01 DIAGNOSIS — E87.1 HYPO-OSMOLALITY AND HYPONATREMIA: ICD-10-CM

## 2020-01-01 DIAGNOSIS — K29.70 GASTRITIS, UNSPECIFIED, WITHOUT BLEEDING: ICD-10-CM

## 2020-01-01 LAB
ALBUMIN SERPL ELPH-MCNC: 3 G/DL — LOW (ref 3.3–5)
ALBUMIN SERPL ELPH-MCNC: 3.1 G/DL — LOW (ref 3.3–5)
ALBUMIN SERPL ELPH-MCNC: 3.1 G/DL — LOW (ref 3.3–5)
ALBUMIN SERPL ELPH-MCNC: 3.3 G/DL — SIGNIFICANT CHANGE UP (ref 3.3–5)
ALBUMIN SERPL ELPH-MCNC: 3.3 G/DL — SIGNIFICANT CHANGE UP (ref 3.3–5)
ALBUMIN SERPL ELPH-MCNC: 3.4 G/DL — SIGNIFICANT CHANGE UP (ref 3.3–5)
ALP SERPL-CCNC: 89 U/L — SIGNIFICANT CHANGE UP (ref 40–120)
ALT FLD-CCNC: 21 U/L — SIGNIFICANT CHANGE UP (ref 12–78)
ANION GAP SERPL CALC-SCNC: 4 MMOL/L — LOW (ref 5–17)
ANION GAP SERPL CALC-SCNC: 5 MMOL/L — SIGNIFICANT CHANGE UP (ref 5–17)
ANION GAP SERPL CALC-SCNC: 6 MMOL/L — SIGNIFICANT CHANGE UP (ref 5–17)
ANION GAP SERPL CALC-SCNC: 7 MMOL/L — SIGNIFICANT CHANGE UP (ref 5–17)
ANION GAP SERPL CALC-SCNC: 9 MMOL/L — SIGNIFICANT CHANGE UP (ref 5–17)
APPEARANCE UR: CLEAR — SIGNIFICANT CHANGE UP
APTT BLD: 32.8 SEC — SIGNIFICANT CHANGE UP (ref 27.5–35.5)
APTT BLD: 34.7 SEC — SIGNIFICANT CHANGE UP (ref 27.5–35.5)
APTT BLD: 35.1 SEC — SIGNIFICANT CHANGE UP (ref 27.5–35.5)
APTT BLD: 35.2 SEC — SIGNIFICANT CHANGE UP (ref 27.5–35.5)
AST SERPL-CCNC: 26 U/L — SIGNIFICANT CHANGE UP (ref 15–37)
BASOPHILS # BLD AUTO: 0.04 K/UL — SIGNIFICANT CHANGE UP (ref 0–0.2)
BASOPHILS NFR BLD AUTO: 0.6 % — SIGNIFICANT CHANGE UP (ref 0–2)
BILIRUB SERPL-MCNC: 0.6 MG/DL — SIGNIFICANT CHANGE UP (ref 0.2–1.2)
BILIRUB UR-MCNC: NEGATIVE — SIGNIFICANT CHANGE UP
BUN SERPL-MCNC: 64 MG/DL — HIGH (ref 7–23)
BUN SERPL-MCNC: 67 MG/DL — HIGH (ref 7–23)
BUN SERPL-MCNC: 69 MG/DL — HIGH (ref 7–23)
BUN SERPL-MCNC: 73 MG/DL — HIGH (ref 7–23)
BUN SERPL-MCNC: 75 MG/DL — HIGH (ref 7–23)
BUN SERPL-MCNC: 78 MG/DL — HIGH (ref 7–23)
BUN SERPL-MCNC: 79 MG/DL — HIGH (ref 7–23)
BUN SERPL-MCNC: 80 MG/DL — HIGH (ref 7–23)
CALCIUM SERPL-MCNC: 8.9 MG/DL — SIGNIFICANT CHANGE UP (ref 8.5–10.1)
CALCIUM SERPL-MCNC: 8.9 MG/DL — SIGNIFICANT CHANGE UP (ref 8.5–10.1)
CALCIUM SERPL-MCNC: 9 MG/DL — SIGNIFICANT CHANGE UP (ref 8.5–10.1)
CALCIUM SERPL-MCNC: 9 MG/DL — SIGNIFICANT CHANGE UP (ref 8.5–10.1)
CALCIUM SERPL-MCNC: 9.1 MG/DL — SIGNIFICANT CHANGE UP (ref 8.5–10.1)
CALCIUM SERPL-MCNC: 9.2 MG/DL — SIGNIFICANT CHANGE UP (ref 8.5–10.1)
CHLORIDE SERPL-SCNC: 100 MMOL/L — SIGNIFICANT CHANGE UP (ref 96–108)
CHLORIDE SERPL-SCNC: 102 MMOL/L — SIGNIFICANT CHANGE UP (ref 96–108)
CHLORIDE SERPL-SCNC: 103 MMOL/L — SIGNIFICANT CHANGE UP (ref 96–108)
CHLORIDE SERPL-SCNC: 104 MMOL/L — SIGNIFICANT CHANGE UP (ref 96–108)
CHLORIDE SERPL-SCNC: 104 MMOL/L — SIGNIFICANT CHANGE UP (ref 96–108)
CO2 SERPL-SCNC: 24 MMOL/L — SIGNIFICANT CHANGE UP (ref 22–31)
CO2 SERPL-SCNC: 25 MMOL/L — SIGNIFICANT CHANGE UP (ref 22–31)
CO2 SERPL-SCNC: 27 MMOL/L — SIGNIFICANT CHANGE UP (ref 22–31)
CO2 SERPL-SCNC: 28 MMOL/L — SIGNIFICANT CHANGE UP (ref 22–31)
CO2 SERPL-SCNC: 29 MMOL/L — SIGNIFICANT CHANGE UP (ref 22–31)
CO2 SERPL-SCNC: 31 MMOL/L — SIGNIFICANT CHANGE UP (ref 22–31)
COLOR SPEC: YELLOW — SIGNIFICANT CHANGE UP
CREAT SERPL-MCNC: 2.95 MG/DL — HIGH (ref 0.5–1.3)
CREAT SERPL-MCNC: 3.1 MG/DL — HIGH (ref 0.5–1.3)
CREAT SERPL-MCNC: 3.22 MG/DL — HIGH (ref 0.5–1.3)
CREAT SERPL-MCNC: 3.28 MG/DL — HIGH (ref 0.5–1.3)
CREAT SERPL-MCNC: 3.35 MG/DL — HIGH (ref 0.5–1.3)
CREAT SERPL-MCNC: 3.36 MG/DL — HIGH (ref 0.5–1.3)
CREAT SERPL-MCNC: 3.47 MG/DL — HIGH (ref 0.5–1.3)
CREAT SERPL-MCNC: 3.49 MG/DL — HIGH (ref 0.5–1.3)
CULTURE RESULTS: SIGNIFICANT CHANGE UP
DIFF PNL FLD: ABNORMAL
EOSINOPHIL # BLD AUTO: 0.06 K/UL — SIGNIFICANT CHANGE UP (ref 0–0.5)
EOSINOPHIL NFR BLD AUTO: 0.9 % — SIGNIFICANT CHANGE UP (ref 0–6)
GLUCOSE SERPL-MCNC: 102 MG/DL — HIGH (ref 70–99)
GLUCOSE SERPL-MCNC: 111 MG/DL — HIGH (ref 70–99)
GLUCOSE SERPL-MCNC: 115 MG/DL — HIGH (ref 70–99)
GLUCOSE SERPL-MCNC: 131 MG/DL — HIGH (ref 70–99)
GLUCOSE SERPL-MCNC: 86 MG/DL — SIGNIFICANT CHANGE UP (ref 70–99)
GLUCOSE SERPL-MCNC: 91 MG/DL — SIGNIFICANT CHANGE UP (ref 70–99)
GLUCOSE SERPL-MCNC: 91 MG/DL — SIGNIFICANT CHANGE UP (ref 70–99)
GLUCOSE SERPL-MCNC: 92 MG/DL — SIGNIFICANT CHANGE UP (ref 70–99)
GLUCOSE UR QL: NEGATIVE MG/DL — SIGNIFICANT CHANGE UP
HCT VFR BLD CALC: 28.2 % — LOW (ref 39–50)
HCT VFR BLD CALC: 28.3 % — LOW (ref 39–50)
HCT VFR BLD CALC: 28.9 % — LOW (ref 39–50)
HCT VFR BLD CALC: 29.3 % — LOW (ref 39–50)
HGB BLD-MCNC: 8.9 G/DL — LOW (ref 13–17)
HGB BLD-MCNC: 9 G/DL — LOW (ref 13–17)
HGB BLD-MCNC: 9.1 G/DL — LOW (ref 13–17)
HGB BLD-MCNC: 9.5 G/DL — LOW (ref 13–17)
IMM GRANULOCYTES NFR BLD AUTO: 0.6 % — SIGNIFICANT CHANGE UP (ref 0–1.5)
INR BLD: 1.62 RATIO — HIGH (ref 0.88–1.16)
INR BLD: 1.68 RATIO — HIGH (ref 0.88–1.16)
INR BLD: 1.68 RATIO — HIGH (ref 0.88–1.16)
INR BLD: 2.31 RATIO — HIGH (ref 0.88–1.16)
INR PPP: 1.58 RATIO
INR PPP: 1.75 RATIO
INR PPP: 1.8 RATIO
KETONES UR-MCNC: NEGATIVE — SIGNIFICANT CHANGE UP
LEUKOCYTE ESTERASE UR-ACNC: NEGATIVE — SIGNIFICANT CHANGE UP
LYMPHOCYTES # BLD AUTO: 1 K/UL — SIGNIFICANT CHANGE UP (ref 1–3.3)
LYMPHOCYTES # BLD AUTO: 15.6 % — SIGNIFICANT CHANGE UP (ref 13–44)
MCHC RBC-ENTMCNC: 31.4 GM/DL — LOW (ref 32–36)
MCHC RBC-ENTMCNC: 31.5 GM/DL — LOW (ref 32–36)
MCHC RBC-ENTMCNC: 31.9 GM/DL — LOW (ref 32–36)
MCHC RBC-ENTMCNC: 32.2 PG — SIGNIFICANT CHANGE UP (ref 27–34)
MCHC RBC-ENTMCNC: 32.4 GM/DL — SIGNIFICANT CHANGE UP (ref 32–36)
MCHC RBC-ENTMCNC: 32.4 PG — SIGNIFICANT CHANGE UP (ref 27–34)
MCHC RBC-ENTMCNC: 32.8 PG — SIGNIFICANT CHANGE UP (ref 27–34)
MCHC RBC-ENTMCNC: 32.9 PG — SIGNIFICANT CHANGE UP (ref 27–34)
MCV RBC AUTO: 101.4 FL — HIGH (ref 80–100)
MCV RBC AUTO: 102.1 FL — HIGH (ref 80–100)
MCV RBC AUTO: 102.9 FL — HIGH (ref 80–100)
MCV RBC AUTO: 102.9 FL — HIGH (ref 80–100)
MONOCYTES # BLD AUTO: 0.48 K/UL — SIGNIFICANT CHANGE UP (ref 0–0.9)
MONOCYTES NFR BLD AUTO: 7.5 % — SIGNIFICANT CHANGE UP (ref 2–14)
NEUTROPHILS # BLD AUTO: 4.81 K/UL — SIGNIFICANT CHANGE UP (ref 1.8–7.4)
NEUTROPHILS NFR BLD AUTO: 74.8 % — SIGNIFICANT CHANGE UP (ref 43–77)
NITRITE UR-MCNC: NEGATIVE — SIGNIFICANT CHANGE UP
NT-PROBNP SERPL-SCNC: 4045 PG/ML — HIGH (ref 0–450)
OSMOLALITY SERPL: 306 MOSMOL/KG — HIGH (ref 280–301)
PH UR: 7 — SIGNIFICANT CHANGE UP (ref 5–8)
PHOSPHATE SERPL-MCNC: 4 MG/DL — SIGNIFICANT CHANGE UP (ref 2.5–4.5)
PHOSPHATE SERPL-MCNC: 4.1 MG/DL — SIGNIFICANT CHANGE UP (ref 2.5–4.5)
PHOSPHATE SERPL-MCNC: 4.1 MG/DL — SIGNIFICANT CHANGE UP (ref 2.5–4.5)
PHOSPHATE SERPL-MCNC: 4.2 MG/DL — SIGNIFICANT CHANGE UP (ref 2.5–4.5)
PHOSPHATE SERPL-MCNC: 4.3 MG/DL — SIGNIFICANT CHANGE UP (ref 2.5–4.5)
PLATELET # BLD AUTO: 176 K/UL — SIGNIFICANT CHANGE UP (ref 150–400)
PLATELET # BLD AUTO: 180 K/UL — SIGNIFICANT CHANGE UP (ref 150–400)
PLATELET # BLD AUTO: 202 K/UL — SIGNIFICANT CHANGE UP (ref 150–400)
PLATELET # BLD AUTO: 225 K/UL — SIGNIFICANT CHANGE UP (ref 150–400)
POTASSIUM SERPL-MCNC: 4.1 MMOL/L — SIGNIFICANT CHANGE UP (ref 3.5–5.3)
POTASSIUM SERPL-MCNC: 4.2 MMOL/L — SIGNIFICANT CHANGE UP (ref 3.5–5.3)
POTASSIUM SERPL-MCNC: 4.2 MMOL/L — SIGNIFICANT CHANGE UP (ref 3.5–5.3)
POTASSIUM SERPL-MCNC: 4.3 MMOL/L — SIGNIFICANT CHANGE UP (ref 3.5–5.3)
POTASSIUM SERPL-MCNC: 4.6 MMOL/L — SIGNIFICANT CHANGE UP (ref 3.5–5.3)
POTASSIUM SERPL-MCNC: 4.7 MMOL/L — SIGNIFICANT CHANGE UP (ref 3.5–5.3)
POTASSIUM SERPL-MCNC: 4.8 MMOL/L — SIGNIFICANT CHANGE UP (ref 3.5–5.3)
POTASSIUM SERPL-MCNC: 4.9 MMOL/L — SIGNIFICANT CHANGE UP (ref 3.5–5.3)
POTASSIUM SERPL-SCNC: 4.1 MMOL/L — SIGNIFICANT CHANGE UP (ref 3.5–5.3)
POTASSIUM SERPL-SCNC: 4.2 MMOL/L — SIGNIFICANT CHANGE UP (ref 3.5–5.3)
POTASSIUM SERPL-SCNC: 4.2 MMOL/L — SIGNIFICANT CHANGE UP (ref 3.5–5.3)
POTASSIUM SERPL-SCNC: 4.3 MMOL/L — SIGNIFICANT CHANGE UP (ref 3.5–5.3)
POTASSIUM SERPL-SCNC: 4.6 MMOL/L — SIGNIFICANT CHANGE UP (ref 3.5–5.3)
POTASSIUM SERPL-SCNC: 4.7 MMOL/L — SIGNIFICANT CHANGE UP (ref 3.5–5.3)
POTASSIUM SERPL-SCNC: 4.8 MMOL/L — SIGNIFICANT CHANGE UP (ref 3.5–5.3)
POTASSIUM SERPL-SCNC: 4.9 MMOL/L — SIGNIFICANT CHANGE UP (ref 3.5–5.3)
PROT SERPL-MCNC: 8.4 GM/DL — HIGH (ref 6–8.3)
PROT UR-MCNC: 15 MG/DL
PROTHROM AB SERPL-ACNC: 18.5 SEC — HIGH (ref 10.6–13.6)
PROTHROM AB SERPL-ACNC: 19 SEC — HIGH (ref 10.6–13.6)
PROTHROM AB SERPL-ACNC: 19.2 SEC — HIGH (ref 10.6–13.6)
PROTHROM AB SERPL-ACNC: 25.8 SEC — HIGH (ref 10.6–13.6)
PT BLD: 18.2 SEC
PT BLD: 20.1 SEC
PT BLD: 20.6 SEC
RBC # BLD: 2.74 M/UL — LOW (ref 4.2–5.8)
RBC # BLD: 2.75 M/UL — LOW (ref 4.2–5.8)
RBC # BLD: 2.83 M/UL — LOW (ref 4.2–5.8)
RBC # BLD: 2.89 M/UL — LOW (ref 4.2–5.8)
RBC # FLD: 13.6 % — SIGNIFICANT CHANGE UP (ref 10.3–14.5)
RBC # FLD: 13.7 % — SIGNIFICANT CHANGE UP (ref 10.3–14.5)
SARS-COV-2 IGG SERPL QL IA: NEGATIVE — SIGNIFICANT CHANGE UP
SARS-COV-2 IGM SERPL IA-ACNC: 0.44 INDEX — SIGNIFICANT CHANGE UP
SARS-COV-2 RNA SPEC QL NAA+PROBE: SIGNIFICANT CHANGE UP
SARS-COV-2 RNA SPEC QL NAA+PROBE: SIGNIFICANT CHANGE UP
SODIUM SERPL-SCNC: 132 MMOL/L — LOW (ref 135–145)
SODIUM SERPL-SCNC: 135 MMOL/L — SIGNIFICANT CHANGE UP (ref 135–145)
SODIUM SERPL-SCNC: 136 MMOL/L — SIGNIFICANT CHANGE UP (ref 135–145)
SODIUM SERPL-SCNC: 137 MMOL/L — SIGNIFICANT CHANGE UP (ref 135–145)
SODIUM SERPL-SCNC: 138 MMOL/L — SIGNIFICANT CHANGE UP (ref 135–145)
SODIUM SERPL-SCNC: 140 MMOL/L — SIGNIFICANT CHANGE UP (ref 135–145)
SP GR SPEC: 1 — LOW (ref 1.01–1.02)
SPECIMEN SOURCE: SIGNIFICANT CHANGE UP
TROPONIN I SERPL-MCNC: <0.015 NG/ML — SIGNIFICANT CHANGE UP (ref 0.01–0.04)
UROBILINOGEN FLD QL: NEGATIVE MG/DL — SIGNIFICANT CHANGE UP
WBC # BLD: 4.66 K/UL — SIGNIFICANT CHANGE UP (ref 3.8–10.5)
WBC # BLD: 4.97 K/UL — SIGNIFICANT CHANGE UP (ref 3.8–10.5)
WBC # BLD: 5.79 K/UL — SIGNIFICANT CHANGE UP (ref 3.8–10.5)
WBC # BLD: 6.43 K/UL — SIGNIFICANT CHANGE UP (ref 3.8–10.5)
WBC # FLD AUTO: 4.66 K/UL — SIGNIFICANT CHANGE UP (ref 3.8–10.5)
WBC # FLD AUTO: 4.97 K/UL — SIGNIFICANT CHANGE UP (ref 3.8–10.5)
WBC # FLD AUTO: 5.79 K/UL — SIGNIFICANT CHANGE UP (ref 3.8–10.5)
WBC # FLD AUTO: 6.43 K/UL — SIGNIFICANT CHANGE UP (ref 3.8–10.5)

## 2020-01-01 PROCEDURE — 99232 SBSQ HOSP IP/OBS MODERATE 35: CPT | Mod: GC

## 2020-01-01 PROCEDURE — 85610 PROTHROMBIN TIME: CPT

## 2020-01-01 PROCEDURE — 93306 TTE W/DOPPLER COMPLETE: CPT

## 2020-01-01 PROCEDURE — 97163 PT EVAL HIGH COMPLEX 45 MIN: CPT | Mod: GP

## 2020-01-01 PROCEDURE — 93005 ELECTROCARDIOGRAM TRACING: CPT

## 2020-01-01 PROCEDURE — G0506: CPT

## 2020-01-01 PROCEDURE — 99487 CPLX CHRNC CARE 1ST 60 MIN: CPT

## 2020-01-01 PROCEDURE — 80048 BASIC METABOLIC PNL TOTAL CA: CPT

## 2020-01-01 PROCEDURE — 36415 COLL VENOUS BLD VENIPUNCTURE: CPT

## 2020-01-01 PROCEDURE — 71045 X-RAY EXAM CHEST 1 VIEW: CPT | Mod: 26

## 2020-01-01 PROCEDURE — 99345 HOME/RES VST NEW HIGH MDM 75: CPT | Mod: 25

## 2020-01-01 PROCEDURE — 99233 SBSQ HOSP IP/OBS HIGH 50: CPT | Mod: GC

## 2020-01-01 PROCEDURE — 74176 CT ABD & PELVIS W/O CONTRAST: CPT | Mod: 26

## 2020-01-01 PROCEDURE — 99239 HOSP IP/OBS DSCHRG MGMT >30: CPT

## 2020-01-01 PROCEDURE — U0003: CPT

## 2020-01-01 PROCEDURE — 86769 SARS-COV-2 COVID-19 ANTIBODY: CPT

## 2020-01-01 PROCEDURE — 80069 RENAL FUNCTION PANEL: CPT

## 2020-01-01 PROCEDURE — 93010 ELECTROCARDIOGRAM REPORT: CPT

## 2020-01-01 PROCEDURE — 83930 ASSAY OF BLOOD OSMOLALITY: CPT

## 2020-01-01 PROCEDURE — 99223 1ST HOSP IP/OBS HIGH 75: CPT | Mod: GC,AI

## 2020-01-01 PROCEDURE — 85730 THROMBOPLASTIN TIME PARTIAL: CPT

## 2020-01-01 PROCEDURE — 93306 TTE W/DOPPLER COMPLETE: CPT | Mod: 26

## 2020-01-01 PROCEDURE — 99495 TRANSJ CARE MGMT MOD F2F 14D: CPT

## 2020-01-01 PROCEDURE — 97116 GAIT TRAINING THERAPY: CPT | Mod: GP

## 2020-01-01 PROCEDURE — 85027 COMPLETE CBC AUTOMATED: CPT

## 2020-01-01 RX ORDER — METOPROLOL TARTRATE 50 MG
50 TABLET ORAL DAILY
Refills: 0 | Status: DISCONTINUED | OUTPATIENT
Start: 2020-01-01 | End: 2020-01-01

## 2020-01-01 RX ORDER — FUROSEMIDE 40 MG
80 TABLET ORAL DAILY
Refills: 0 | Status: DISCONTINUED | OUTPATIENT
Start: 2020-01-01 | End: 2020-01-01

## 2020-01-01 RX ORDER — ATORVASTATIN CALCIUM 80 MG/1
40 TABLET, FILM COATED ORAL DAILY
Refills: 0 | Status: DISCONTINUED | OUTPATIENT
Start: 2020-01-01 | End: 2020-01-01

## 2020-01-01 RX ORDER — TAMSULOSIN HYDROCHLORIDE 0.4 MG/1
0.4 CAPSULE ORAL AT BEDTIME
Refills: 0 | Status: DISCONTINUED | OUTPATIENT
Start: 2020-01-01 | End: 2020-01-01

## 2020-01-01 RX ORDER — FUROSEMIDE 40 MG
60 TABLET ORAL
Refills: 0 | Status: DISCONTINUED | OUTPATIENT
Start: 2020-01-01 | End: 2020-01-01

## 2020-01-01 RX ORDER — WARFARIN SODIUM 2.5 MG/1
3 TABLET ORAL DAILY
Refills: 0 | Status: DISCONTINUED | OUTPATIENT
Start: 2020-01-01 | End: 2020-01-01

## 2020-01-01 RX ORDER — FUROSEMIDE 40 MG
1 TABLET ORAL
Qty: 0 | Refills: 0 | DISCHARGE
Start: 2020-01-01 | End: 2020-01-01

## 2020-01-01 RX ORDER — METOPROLOL TARTRATE 50 MG
25 TABLET ORAL DAILY
Refills: 0 | Status: DISCONTINUED | OUTPATIENT
Start: 2020-01-01 | End: 2020-01-01

## 2020-01-01 RX ORDER — WARFARIN SODIUM 2.5 MG/1
2 TABLET ORAL DAILY
Refills: 0 | Status: DISCONTINUED | OUTPATIENT
Start: 2020-01-01 | End: 2020-01-01

## 2020-01-01 RX ORDER — PANTOPRAZOLE SODIUM 20 MG/1
40 TABLET, DELAYED RELEASE ORAL
Refills: 0 | Status: DISCONTINUED | OUTPATIENT
Start: 2020-01-01 | End: 2020-01-01

## 2020-01-01 RX ORDER — TIMOLOL 0.5 %
1 DROPS OPHTHALMIC (EYE) DAILY
Refills: 0 | Status: DISCONTINUED | OUTPATIENT
Start: 2020-01-01 | End: 2020-01-01

## 2020-01-01 RX ORDER — FUROSEMIDE 40 MG
60 TABLET ORAL THREE TIMES A DAY
Refills: 0 | Status: DISCONTINUED | OUTPATIENT
Start: 2020-01-01 | End: 2020-01-01

## 2020-01-01 RX ORDER — SODIUM CHLORIDE 9 MG/ML
250 INJECTION INTRAMUSCULAR; INTRAVENOUS; SUBCUTANEOUS ONCE
Refills: 0 | Status: COMPLETED | OUTPATIENT
Start: 2020-01-01 | End: 2020-01-01

## 2020-01-01 RX ORDER — FUROSEMIDE 40 MG
1 TABLET ORAL
Qty: 30 | Refills: 0
Start: 2020-01-01 | End: 2020-01-01

## 2020-01-01 RX ORDER — WARFARIN SODIUM 2.5 MG/1
4 TABLET ORAL DAILY
Refills: 0 | Status: DISCONTINUED | OUTPATIENT
Start: 2020-01-01 | End: 2020-01-01

## 2020-01-01 RX ORDER — SERTRALINE 25 MG/1
1 TABLET, FILM COATED ORAL
Qty: 0 | Refills: 0 | DISCHARGE
Start: 2020-01-01

## 2020-01-01 RX ORDER — FUROSEMIDE 40 MG
40 TABLET ORAL
Refills: 0 | Status: DISCONTINUED | OUTPATIENT
Start: 2020-01-01 | End: 2020-01-01

## 2020-01-01 RX ORDER — FUROSEMIDE 40 MG
1 TABLET ORAL
Qty: 0 | Refills: 0 | DISCHARGE

## 2020-01-01 RX ORDER — POLYETHYLENE GLYCOL 3350 17 G/17G
17 POWDER, FOR SOLUTION ORAL DAILY
Refills: 0 | Status: DISCONTINUED | OUTPATIENT
Start: 2020-01-01 | End: 2020-01-01

## 2020-01-01 RX ORDER — CYPROHEPTADINE HYDROCHLORIDE 4 MG/1
4 TABLET ORAL EVERY 12 HOURS
Refills: 0 | Status: DISCONTINUED | OUTPATIENT
Start: 2020-01-01 | End: 2020-01-01

## 2020-01-01 RX ORDER — SERTRALINE 25 MG/1
25 TABLET, FILM COATED ORAL DAILY
Refills: 0 | Status: DISCONTINUED | OUTPATIENT
Start: 2020-01-01 | End: 2020-01-01

## 2020-01-01 RX ORDER — FINASTERIDE 5 MG/1
5 TABLET, FILM COATED ORAL DAILY
Refills: 0 | Status: DISCONTINUED | OUTPATIENT
Start: 2020-01-01 | End: 2020-01-01

## 2020-01-01 RX ORDER — FUROSEMIDE 40 MG
40 TABLET ORAL DAILY
Refills: 0 | Status: DISCONTINUED | OUTPATIENT
Start: 2020-01-01 | End: 2020-01-01

## 2020-01-01 RX ORDER — SERTRALINE 25 MG/1
1 TABLET, FILM COATED ORAL
Qty: 0 | Refills: 0 | DISCHARGE

## 2020-01-01 RX ADMIN — FINASTERIDE 5 MILLIGRAM(S): 5 TABLET, FILM COATED ORAL at 09:54

## 2020-01-01 RX ADMIN — Medication 60 MILLIGRAM(S): at 21:14

## 2020-01-01 RX ADMIN — POLYETHYLENE GLYCOL 3350 17 GRAM(S): 17 POWDER, FOR SOLUTION ORAL at 10:06

## 2020-01-01 RX ADMIN — SERTRALINE 25 MILLIGRAM(S): 25 TABLET, FILM COATED ORAL at 10:07

## 2020-01-01 RX ADMIN — WARFARIN SODIUM 3 MILLIGRAM(S): 2.5 TABLET ORAL at 18:27

## 2020-01-01 RX ADMIN — ATORVASTATIN CALCIUM 40 MILLIGRAM(S): 80 TABLET, FILM COATED ORAL at 21:41

## 2020-01-01 RX ADMIN — TAMSULOSIN HYDROCHLORIDE 0.4 MILLIGRAM(S): 0.4 CAPSULE ORAL at 21:14

## 2020-01-01 RX ADMIN — Medication 80 MILLIGRAM(S): at 14:28

## 2020-01-01 RX ADMIN — Medication 40 MILLIGRAM(S): at 23:11

## 2020-01-01 RX ADMIN — Medication 80 MILLIGRAM(S): at 09:59

## 2020-01-01 RX ADMIN — Medication 50 MILLIGRAM(S): at 09:44

## 2020-01-01 RX ADMIN — TAMSULOSIN HYDROCHLORIDE 0.4 MILLIGRAM(S): 0.4 CAPSULE ORAL at 21:41

## 2020-01-01 RX ADMIN — FINASTERIDE 5 MILLIGRAM(S): 5 TABLET, FILM COATED ORAL at 09:46

## 2020-01-01 RX ADMIN — SODIUM CHLORIDE 250 MILLILITER(S): 9 INJECTION INTRAMUSCULAR; INTRAVENOUS; SUBCUTANEOUS at 10:31

## 2020-01-01 RX ADMIN — ATORVASTATIN CALCIUM 40 MILLIGRAM(S): 80 TABLET, FILM COATED ORAL at 21:32

## 2020-01-01 RX ADMIN — TAMSULOSIN HYDROCHLORIDE 0.4 MILLIGRAM(S): 0.4 CAPSULE ORAL at 21:32

## 2020-01-01 RX ADMIN — Medication 40 MILLIGRAM(S): at 10:06

## 2020-01-01 RX ADMIN — POLYETHYLENE GLYCOL 3350 17 GRAM(S): 17 POWDER, FOR SOLUTION ORAL at 09:54

## 2020-01-01 RX ADMIN — SERTRALINE 25 MILLIGRAM(S): 25 TABLET, FILM COATED ORAL at 09:54

## 2020-01-01 RX ADMIN — FINASTERIDE 5 MILLIGRAM(S): 5 TABLET, FILM COATED ORAL at 10:06

## 2020-01-01 RX ADMIN — Medication 1 DROP(S): at 13:35

## 2020-01-01 RX ADMIN — Medication 50 MILLIGRAM(S): at 09:59

## 2020-01-01 RX ADMIN — PANTOPRAZOLE SODIUM 40 MILLIGRAM(S): 20 TABLET, DELAYED RELEASE ORAL at 05:53

## 2020-01-01 RX ADMIN — WARFARIN SODIUM 4 MILLIGRAM(S): 2.5 TABLET ORAL at 21:14

## 2020-01-01 RX ADMIN — Medication 1 DROP(S): at 09:45

## 2020-01-01 RX ADMIN — SERTRALINE 25 MILLIGRAM(S): 25 TABLET, FILM COATED ORAL at 09:45

## 2020-01-01 RX ADMIN — PANTOPRAZOLE SODIUM 40 MILLIGRAM(S): 20 TABLET, DELAYED RELEASE ORAL at 06:00

## 2020-01-01 RX ADMIN — WARFARIN SODIUM 3 MILLIGRAM(S): 2.5 TABLET ORAL at 21:32

## 2020-01-01 RX ADMIN — PANTOPRAZOLE SODIUM 40 MILLIGRAM(S): 20 TABLET, DELAYED RELEASE ORAL at 05:28

## 2020-01-01 RX ADMIN — Medication 1 DROP(S): at 09:59

## 2020-01-01 RX ADMIN — PANTOPRAZOLE SODIUM 40 MILLIGRAM(S): 20 TABLET, DELAYED RELEASE ORAL at 05:20

## 2020-01-01 RX ADMIN — Medication 60 MILLIGRAM(S): at 05:52

## 2020-01-01 RX ADMIN — SERTRALINE 25 MILLIGRAM(S): 25 TABLET, FILM COATED ORAL at 09:59

## 2020-01-01 RX ADMIN — CYPROHEPTADINE HYDROCHLORIDE 4 MILLIGRAM(S): 4 TABLET ORAL at 10:07

## 2020-01-01 RX ADMIN — WARFARIN SODIUM 4 MILLIGRAM(S): 2.5 TABLET ORAL at 21:41

## 2020-01-01 RX ADMIN — POLYETHYLENE GLYCOL 3350 17 GRAM(S): 17 POWDER, FOR SOLUTION ORAL at 09:45

## 2020-01-01 RX ADMIN — Medication 60 MILLIGRAM(S): at 13:40

## 2020-01-01 RX ADMIN — FINASTERIDE 5 MILLIGRAM(S): 5 TABLET, FILM COATED ORAL at 09:59

## 2020-01-01 RX ADMIN — Medication 50 MILLIGRAM(S): at 10:06

## 2020-01-01 RX ADMIN — Medication 1 DROP(S): at 09:54

## 2020-01-01 RX ADMIN — ATORVASTATIN CALCIUM 40 MILLIGRAM(S): 80 TABLET, FILM COATED ORAL at 21:14

## 2020-01-01 RX ADMIN — POLYETHYLENE GLYCOL 3350 17 GRAM(S): 17 POWDER, FOR SOLUTION ORAL at 09:59

## 2020-10-04 NOTE — ED PROVIDER NOTE - CLINICAL SUMMARY MEDICAL DECISION MAKING FREE TEXT BOX
89 y/o male unable ot urinate, presents for CHF exacerbation, constipation, blood work, will place Mercedes and admit for further w/u 89 y/o male unable to urinate, presents for CHF exacerbation, constipation, blood work, will place Mercedes and admit for further w/u

## 2020-10-04 NOTE — ED PROVIDER NOTE - OBJECTIVE STATEMENT
89 y/o male with PMHx of CVA, BPH, HTN, CAD pacemaker presents to the ED c/o constipation. +Associated abd pain, vomiting, b/l leg edema. Pt on 80mg Lasix, follows with Dr. Alfaro. Recently has stopped urinating, experienced a 10 lb weight loss. Pt's daughter states he has frequent dyspnea on exertion, and his O2 sat was low the last time he visited PMD. Pt on Coumadin.  c/o depression, decrease appetite, weakness and dysuria. +mild shortness of breath. Denies abdominal pain 89 y/o male with PMHx of CVA, BPH, HTN, CAD, pacemaker presents to the ED c/o decrease of appetite, weakness and unable to urinate. +mild shortness of breath. Denies abdominal pain. As per daughter pt has been constipated, vomiting, and has b/l leg edema. Pt on 80mg Lasix, follows with Dr. Alfaro. Recently has stopped urinating, experienced a 10 lb weight loss. Pt's daughter states he has frequent dyspnea on exertion, and his O2 sat was low the last time he visited PMD. Pt on Coumadin.

## 2020-10-04 NOTE — ED ADULT TRIAGE NOTE - CHIEF COMPLAINT QUOTE
As per daughter and son, pt endorses decreased PO intake, constipation, nausea x3days, Decreased urination. Maria Antonia spaulding, daughter, [phone].

## 2020-10-04 NOTE — ED ADULT NURSE REASSESSMENT NOTE - NS ED NURSE REASSESS COMMENT FT1
Spoke to patient about administration of LASIX with [place]  [id number] Alicia. Patient verbalized understanding of side effects of the medications and agreed to purewick placement so he doesn't have to get up to use the bathroom. Refused oden catheter placement. Patient alert/oriented. VSS

## 2020-10-04 NOTE — ED PROVIDER NOTE - MUSCULOSKELETAL, MLM
Spine appears normal, range of motion is not limited, no muscle or joint tenderness. 2+ pitting edema up to b/l knees

## 2020-10-04 NOTE — ED ADULT NURSE NOTE - INTERVENTIONS DEFINITIONS
Physically safe environment: no spills, clutter or unnecessary equipment/Provide visual clues: red socks/Stretcher in lowest position, wheels locked, appropriate side rails in place/Provide visual cue, wrist band, yellow gown, etc./Monitor gait and stability/Monitor for mental status changes and reorient to person, place, and time

## 2020-10-04 NOTE — ED STATDOCS - PROGRESS NOTE DETAILS
Joanne VERAS for ED attending, Dr. Blanco: 89 y/o male with PMHx of CVA, BPH, HTN, CAD pacemaker presents to the ED c/o constipation. Associated abd pain, vomiting, b/l leg edema. Pt on 80mg Lasix, follows with Dr. Alfaro. Recently has stopped urinating, experienced a 10 lb weight loss. Pt's daughter states he has frequent dyspnea on exertion, and his O2 sat was low the last time he visited PMD. Pt on Coumadin. Pt's daughter, [phone]. Will send pt to main ED for further evaluation.

## 2020-10-04 NOTE — ED PROVIDER NOTE - CONSTITUTIONAL, MLM
normal... Well appearing, awake, alert, oriented to person, place, time/situation and in no apparent distress. Elderly male laying bed, awake, alert, oriented to person, place, time/situation and in no apparent distress.

## 2020-10-04 NOTE — ED ADULT NURSE NOTE - OBJECTIVE STATEMENT
Ambulatory via hospital wheelchair brought in by daughter who states that patient has been constipated with decreased PO intake with difficulty urinating x3 days. At present denies N/V/D or abdominal pain, travel, sick contacts, or flu like symptoms. VSS. Calm and cooperative, [place] speaking primarily but has competency of English language, minimally [place]. 18g PIV inserted to L FA, labs drawn and sent. COVID swab pending result. Continuing cardiac monitoring in place. Pending evaluation by provider. Safety maintained, needs attended, will continue to monitor.

## 2020-10-04 NOTE — ED ADULT NURSE NOTE - CHIEF COMPLAINT QUOTE
As per daughter and son, pt endorses decreased PO intake, constipation, nausea x3days, Decreased urination. Maria Antonia spaulding, daughter, [phone].

## 2020-10-05 NOTE — H&P ADULT - NSICDXPASTMEDICALHX_GEN_ALL_CORE_FT
PAST MEDICAL HISTORY:  BPH (benign prostatic hyperplasia)     Congestive heart failure     Coronary artery disease s/p PCI    CVA (cerebrovascular accident) Over 10 years ago    Dementia     Depression     History of gastritis     HTN (hypertension)     Pacemaker

## 2020-10-05 NOTE — H&P ADULT - HISTORY OF PRESENT ILLNESS
89 yo M w PMH of CHF w EF of 65% (May 2019), HTN, CAD, 89 yo M w PMH of dementia, CHF w EF of 65% (May 2019), HTN, CAD s/p PCI, s/p pacemaker, Afib on Warfarin, BPH, depression & gastritis presented to the ED w daughter for SOB on exertion. Pt is pleasantly confused, responds appropriately but poor historian. History obtained from  previous charts and daughter Maria Antonia Berger (HCP). Daughter states that pt has had decreased mobility over the past 5 months, being relatively independent to decreasing mobility to increased SOB on exertion, ambulates on a roller. States that pt has also had decreased PO intake, decreased urination, 10 lb weight loss over the past week, since his Furosemide was increased to 80mg QD. Daughter states that pt started seeing Dr. Alfaro (Nephrologist) about 3 months ago due to "renal failure", states new baseline of Cr is 3. At bedside, pt denies chest pain, SOB, abd pain, HA, palpitations, feeling light headed or malaise.   Last admission in Sept 2019 for HF exacerbation.   In ED, pts vitals were stable, oxygenating well on RA. Pt was given 40mg Lasix.

## 2020-10-05 NOTE — H&P ADULT - RESPIRATORY AND THORAX
Call placed to patient to confirm knowledge of appointment, if she has any questions she is to call back to [facility]   details…

## 2020-10-05 NOTE — CONSULT NOTE ADULT - ASSESSMENT
A/P: 89 yo M w PMH of dementia, CHF w EF of 65% (May 2019), HTN, CAD s/p PCI, s/p pacemaker, Afib on Warfarin, BPH, depression & gastritis presented to the ED w daughter for SOB on exertion. BNP elevated at 4045, CT shows B/L pleural effusions, R>L, and 2+ pitting edema B/L. Pt also has increased Cr 3.10, daughter states this is new baseline.     1. SOB. Acute on chronic diastolic HF exacerbation, ?PNA.  2Decho 5/19- Normal LV fxn. Repeat pending.  Cont IV diuresis. Pt likely has cardiorenal syndrome- Cr may improve with diuresis.   Suggest renal eval. Strict I/Os, daily wts.   Cont Bbl for HR/BP control.   Suggest CT chest noncontrast to assess PNA/CHF.    2. CRI. Cr now 2.95. ?baseline. Renal consult pending.  Avoid nephrotoxic meds.     3. Atrial fibrillation. Cont rate control with Bbl.   Can continue coumadin for CVA proph for now (elevated CHADs Vasc score) although I am   concerned about LTA in this elderly, demented pt. Will d/w his primary cardiologist.  No bridging at this time.     4. Hypertension. Cont Bbl and follow for now. Avoid ACEi/ARBs.#Hx of CVA  -Atorvastatin 40mg QD    5. CAD s/p PCI. Pt has no active CP. Trops negative x 1.   Conservative medical mgmt. Not an ASA due to hx of gastritis.    6. DVT proph, keep K>4, Mag>2.

## 2020-10-05 NOTE — H&P ADULT - NSHPPHYSICALEXAM_GEN_ALL_CORE
Vital Signs Last 24 Hrs  T(C): 36.5 (05 Oct 2020 02:19), Max: 36.6 (04 Oct 2020 19:31)  T(F): 97.7 (05 Oct 2020 02:19), Max: 97.8 (04 Oct 2020 19:31)  HR: 60 (05 Oct 2020 02:19) (59 - 60)  BP: 118/62 (05 Oct 2020 02:19) (118/62 - 133/60)  BP(mean): 81 (04 Oct 2020 19:31) (81 - 81)  RR: 16 (05 Oct 2020 02:19) (16 - 16)  SpO2: 97% (05 Oct 2020 02:19) (97% - 97%)

## 2020-10-05 NOTE — H&P ADULT - NSICDXPASTSURGICALHX_GEN_ALL_CORE_FT
PAST SURGICAL HISTORY:  History of appendectomy Over 50 years ago    S/P coronary artery stent placement

## 2020-10-05 NOTE — H&P ADULT - ASSESSMENT
89 yo M w PMH of dementia, CHF w EF of 65% (May 2019), HTN, CAD s/p PCI, s/p pacemaker, Afib on Warfarin, BPH, depression & gastritis presented to the ED w daughter for SOB on exertion. BNP elevated at 4045, CT shows B/L pleural effusions, R>L, and 2+ pitting edema B/L. Pt also has increased Cr 3.10, daughter states this is new baseline.     #CHF exacerbation  Pt w PMH of CHF, EF 65% from May 2019, bilateral pitting edema, bilateral pleural effusions on CT.  -Since administration of Lasix in ED, pt has good urine output  -Continue on Lasix 40mg BID  -Continue Metoprolol 25mg BID  -Awaiting official Xray read  -Monitor vitals/oxygen saturation on room air  -Cardio eval?  -Echo?    #Acute on Chronic Renal Failure  -Cr elevated from previous admission, from 1.5 to 3  -Daughter states this is new baseline, seeing Dr. Alfaro for past 5 months  -Consult nephro?  -Trend Cr    #Afib on Coumadin (CHADVASC score of 7)  -Pt in sinus rhythm, on a pacemaker  -INR 1.62, subtherapeutic.   -Daughter states home dose is 2mg warfarin  -Administer 3mg warfarin  -F/U PT/INR in AM, adjust warfarin daily    #HTN  -BP today 124/62  -Continue home Metoprolol 25 mg BID    #BPH  -Pt able to urinate w/o the use of a oden  -Continue home finasteride 5mg QD  -Continue home tamsulosin 0.4mg QD  -Monitor I&O's    #Depression  -Continue home Sertraline 25mg QD    #Hx of CVA  -Continue home Atorvastatin 40mg QD    #CAD s/p PCI    -Continue home Atorvastatin 40mg QD  -Not an ASA due to hx of gastritis  -On Warfarin    #Gastritis  -Denies abd pain  -Continue home omeprazole 20mg     #Decreased mobility  -Pt consult?    #Diet  -DASH diet    #DVT PPX  -On Warfarin    #Advanced Directives  -Discussed w daughter who is HCP, pt is FULL CODE    Discussed w Dr. Xiao         89 yo M w PMH of dementia, CHF w EF of 65% (May 2019), HTN, CAD s/p PCI, s/p pacemaker, Afib on Warfarin, BPH, depression & gastritis presented to the ED w daughter for SOB on exertion. BNP elevated at 4045, CT shows B/L pleural effusions, R>L, and 2+ pitting edema B/L. Pt also has increased Cr 3.10, daughter states this is new baseline.     #CHF exacerbation  Pt w PMH of CHF, EF 65% from May 2019, bilateral pitting edema, bilateral pleural effusions on CT.   -Since administration of Lasix in ED, pt has good urine output  -Continue on Lasix 40mg BID for now, reassess in the AM  -Continue Metoprolol 25mg BID  -Awaiting official Xray read  -Monitor vitals/oxygen saturation on room air  -Daily weights, water restriction up to 2L/day  -Cardio eval placed  -F/U Echo     #Acute on Chronic Renal Failure  -Cr elevated from previous admission, from 1.5 to 3  -Daughter states this is new baseline, seeing Dr. Alfaro for past 5 months  -Trend Cr    #Afib on Coumadin (CHADVASC score of 7)  -Pt in sinus rhythm, on a pacemaker  -INR 1.62, subtherapeutic.   -Daughter states home dose is 2mg warfarin  -Administered 3mg warfarin  -F/U PT/INR in AM, adjust warfarin daily    #Hyponatremia  -Na 132  -F/U Serum osm  -Encourage oral hydration    #Constipation  -Start on bowel regimen  -I&O's    #HTN  -BP today 124/62  -Continue home Metoprolol 25 mg BID    #BPH  -Pt able to urinate w/o the use of a oden  -Continue home finasteride 5mg QD  -Continue home tamsulosin 0.4mg QD  -Monitor I&O's    #Depression  -Continue home Sertraline 25mg QD    #Hx of CVA  -Continue home Atorvastatin 40mg QD    #CAD s/p PCI    -Continue home Atorvastatin 40mg QD  -Not an ASA due to hx of gastritis  -On Warfarin    #Gastritis  -Denies abd pain  -Continue home omeprazole 20mg     #Decreased mobility  -Pt consult    #Diet  -DASH diet    #DVT PPX  -On Warfarin    #Advanced Directives  -Discussed w daughter who is HCP, pt is FULL CODE    Discussed w Dr. Xiao         89 yo M w PMH of dementia, CHF w EF of 65% (May 2019), HTN, CAD s/p PCI, s/p pacemaker, Afib on Warfarin, BPH, depression & gastritis presented to the ED w daughter for SOB on exertion. BNP elevated at 4045, CT shows B/L pleural effusions, R>L, and 2+ pitting edema B/L. Pt also has increased Cr 3.10, daughter states this is new baseline.     #CHF exacerbation  Pt w PMH of CHF, EF 65% from May 2019, bilateral pitting edema, bilateral pleural effusions on CT.   -Since administration of Lasix in ED, pt has good urine output  -Continue on Lasix 40mg BID for now, reassess in the AM  -Continue Toprol XL 50mg QD  -Awaiting official Xray read  -Monitor vitals/oxygen saturation on room air  -Daily weights, water restriction up to 2L/day  -Cardio eval placed  -F/U Echo     #Acute on Chronic Renal Failure  -Cr elevated from previous admission, from 1.5 to 3  -Daughter states this is new baseline, seeing Dr. Alfaro for past 5 months  -Trend Cr    #Afib on Coumadin (CHADVASC score of 7)  -Pt in sinus rhythm, on a pacemaker  -INR 1.62, subtherapeutic.   -Daughter states home dose is 2mg warfarin  -Administered 3mg warfarin  -F/U PT/INR in AM, adjust warfarin daily    #Hyponatremia  -Na 132  -F/U Serum osm  -Encourage oral hydration    #Constipation  -Start on bowel regimen  -I&O's    #HTN  -BP today 124/62  -Continue Toprol XL 50 mg QD    #BPH  -Pt able to urinate w/o the use of a oden  -Continue home finasteride 5mg QD  -Continue home tamsulosin 0.4mg QD  -Monitor I&O's    #Depression  -Continue home Sertraline 25mg QD    #Hx of CVA  -Continue home Atorvastatin 40mg QD    #CAD s/p PCI    -Continue home Atorvastatin 40mg QD  -Not an ASA due to hx of gastritis  -On Warfarin    #Gastritis  -Denies abd pain  -Continue home omeprazole 20mg     #Decreased mobility  -Pt consult    #Diet  -DASH diet    #DVT PPX  -On Warfarin    #Advanced Directives  -Discussed w daughter who is HCP, pt is FULL CODE    Discussed w Dr. Xiao         89 yo M w PMH of dementia, CHF w EF of 65% (May 2019), HTN, CAD s/p PCI, s/p pacemaker, Afib on Warfarin, BPH, depression & gastritis presented to the ED w daughter for SOB on exertion. BNP elevated at 4045, CT shows B/L pleural effusions, R>L, and 2+ pitting edema B/L. Pt also has increased Cr 3.10, daughter states this is new baseline.     #CHF exacerbation  Pt w PMH of CHF, EF 65% from May 2019, bilateral pitting edema, bilateral pleural effusions on CT.   -Since administration of Lasix in ED, pt has good urine output  -Continue on Lasix 40mg BID for now, reassess in the AM  -Continue Toprol XL 50mg QD  -Awaiting official Xray read  -Monitor vitals/oxygen saturation on room air  -Daily weights, water restriction up to 2L/day  -Cardio eval placed  -F/U Echo     #Acute on Chronic Renal Failure  -Cr elevated from previous admission, from 1.5 to 3  -Daughter states this is new baseline, seeing Dr. Alfaro for past 5 months  -Trend Cr    #Afib on Coumadin (CHADVASC score of 7)  -Pt in sinus rhythm, on a pacemaker  -INR 1.62, subtherapeutic.   -Daughter states home dose is 2mg warfarin  -Administered 3mg warfarin  -F/U PT/INR in AM, adjust warfarin daily    #Hyponatremia  -Na 132  -F/U Serum osm  -Encourage oral hydration    #Constipation  -Start on bowel regimen  -I&O's    #HTN  -BP today 124/62  -Continue Toprol XL 50 mg QD    #BPH  -Pt able to urinate w/o the use of a oden  -Continue home finasteride 5mg QD  -Continue home tamsulosin 0.4mg QD  -Monitor I&O's    #Depression  -Continue home Sertraline 25mg QD    #Hx of CVA  -Continue home Atorvastatin 40mg QD    #CAD s/p PCI    -Continue home Atorvastatin 40mg QD  -Not an ASA due to hx of gastritis  -On Warfarin    #Gastritis  -Denies abd pain  -Started on Protonix 40mg    #Decreased mobility  -Pt consult    #Diet  -DASH diet    #DVT PPX  -On Warfarin    #Advanced Directives  -Discussed w daughter who is HCP, pt is FULL CODE    Discussed w Dr. Xiao

## 2020-10-05 NOTE — PROGRESS NOTE ADULT - SUBJECTIVE AND OBJECTIVE BOX
89 yo M w PMH of dementia, CHF w EF of 65% (May 2019), HTN, CAD s/p PCI, s/p pacemaker, Afib on Warfarin, BPH, depression & gastritis presented to the ED w daughter for SOB on exertion. Pt is pleasantly confused, responds appropriately but poor historian. History obtained from  previous charts and daughter Maria Antonia Berger (HCP). Daughter states that pt has had decreased mobility over the past 5 months, being relatively independent to decreasing mobility to increased SOB on exertion, ambulates on a roller. States that pt has also had decreased PO intake, decreased urination, 10 lb weight loss over the past week, since his Furosemide was increased to 80mg QD. Daughter states that pt started seeing Dr. Alfaro (Nephrologist) about 3 months ago due to "renal failure", states new baseline of Cr is 3. At bedside, pt denies chest pain, SOB, abd pain, HA, palpitations, feeling light headed or malaise.   Last admission in Sept 2019 for HF exacerbation.   In ED, pts vitals were stable, oxygenating well on RA. Pt was given 40mg Lasix.     Subjective: Pt seen at bedside. NAD    REVIEW OF SYSTEMS:  CONSTITUTIONAL: No weakness, fevers or chills  EYES/ENT: No visual changes;  No vertigo or throat pain   NECK: No pain or stiffness  RESPIRATORY: No cough, wheezing, hemoptysis; No shortness of breath  CARDIOVASCULAR: No chest pain or palpitations  GASTROINTESTINAL: No abdominal or epigastric pain. No nausea, vomiting, or hematemesis; No diarrhea or constipation. No melena or hematochezia.  GENITOURINARY: No dysuria, frequency or hematuria  NEUROLOGICAL: No numbness or weakness  SKIN: No itching, burning, rashes, or lesions   All other review of systems is negative unless indicated above    PHYSICAL EXAM:  Vital Signs Last 24 Hrs  T(C): 36.3 (05 Oct 2020 08:02), Max: 36.6 (04 Oct 2020 19:31)  T(F): 97.3 (05 Oct 2020 08:02), Max: 97.8 (04 Oct 2020 19:31)  HR: 60 (05 Oct 2020 08:02) (59 - 60)  BP: 112/66 (05 Oct 2020 08:02) (109/69 - 133/60)  BP(mean): 81 (04 Oct 2020 19:31) (81 - 81)  RR: 18 (05 Oct 2020 08:02) (16 - 18)  SpO2: 97% (05 Oct 2020 08:02) (95% - 97%)    Constitutional: Pt lying in bed, awake and alert, NAD  HEENT: EOMI, normal hearing, moist mucous membranes  Neck: Soft and supple, no JVD  Respiratory: CTABL, No wheezing, rales or rhonchi  Cardiovascular: S1S2+, RRR, no M/G/R  Gastrointestinal: BS+, soft, NT/ND, no guarding, no rebound  Extremities: No peripheral edema  Vascular: 2+ peripheral pulses  Neurological: AAOx3, no focal deficits  Musculoskeletal: 5/5 strength b/l upper and lower extremities  Skin: No rashes    MEDICATIONS:  MEDICATIONS  (STANDING):  atorvastatin Oral Tab/Cap - Peds 40 milliGRAM(s) Oral daily  cyproheptadine 4 milliGRAM(s) Oral every 12 hours  finasteride 5 milliGRAM(s) Oral daily  furosemide   Injectable 60 milliGRAM(s) IV Push three times a day  metoprolol succinate ER 50 milliGRAM(s) Oral daily  pantoprazole    Tablet 40 milliGRAM(s) Oral before breakfast  polyethylene glycol 3350 17 Gram(s) Oral daily  sertraline 25 milliGRAM(s) Oral daily  tamsulosin 0.4 milliGRAM(s) Oral at bedtime  timolol 0.5% Solution 1 Drop(s) Both EYES daily  warfarin 4 milliGRAM(s) Oral daily      LABS: All Labs Reviewed:                        9.1    4.66  )-----------( 202      ( 05 Oct 2020 07:50 )             28.9     10-05    136  |  103  |  64<H>  ----------------------------<  91  4.3   |  28  |  2.95<H>    Ca    9.1      05 Oct 2020 07:50    TPro  8.4<H>  /  Alb  3.3  /  TBili  0.6  /  DBili  x   /  AST  26  /  ALT  21  /  AlkPhos  89  10-04    PT/INR - ( 05 Oct 2020 07:50 )   PT: 19.0 sec;   INR: 1.68 ratio         PTT - ( 05 Oct 2020 07:50 )  PTT:32.8 sec  CARDIAC MARKERS ( 04 Oct 2020 19:59 )  <0.015 ng/mL / x     / x     / x     / x          Blood Culture:   I&O's Summary    CAPILLARY BLOOD GLUCOSE          RADIOLOGY/EKG: 89 yo M w PMH of dementia, CHF w EF of 65% (May 2019), HTN, CAD s/p PCI, s/p pacemaker, Afib on Warfarin, BPH, depression & gastritis presented to the ED w daughter for SOB on exertion. Pt is pleasantly confused, responds appropriately but poor historian. History obtained from  previous charts and daughter Maria Antonia Berger (HCP). Daughter states that pt has had decreased mobility over the past 5 months, being relatively independent to decreasing mobility to increased SOB on exertion, ambulates on a roller. States that pt has also had decreased PO intake, decreased urination, 10 lb weight loss over the past week, since his Furosemide was increased to 80mg QD. Daughter states that pt started seeing Dr. Alfaro (Nephrologist) about 3 months ago due to "renal failure", states new baseline of Cr is 3. At bedside, pt denies chest pain, SOB, abd pain, HA, palpitations, feeling light headed or malaise.   Last admission in Sept 2019 for HF exacerbation.   In ED, pts vitals were stable, oxygenating well on RA. Pt was given 40mg Lasix.     Subjective: Pt seen at bedside. No acute events overnight. NAD.     REVIEW OF SYSTEMS:  CONSTITUTIONAL: No weakness, fevers or chills  EYES/ENT: No visual changes;  No vertigo or throat pain   NECK: No pain or stiffness  RESPIRATORY: No cough, wheezing, hemoptysis; No shortness of breath  CARDIOVASCULAR: No chest pain or palpitations  GASTROINTESTINAL: No abdominal or epigastric pain. No nausea, vomiting, or hematemesis; No diarrhea or constipation. No melena or hematochezia.  GENITOURINARY: No dysuria, frequency or hematuria  NEUROLOGICAL: No numbness or weakness  SKIN: No itching, burning, rashes, or lesions   All other review of systems is negative unless indicated above    PHYSICAL EXAM:  Vital Signs Last 24 Hrs  T(C): 36.3 (05 Oct 2020 08:02), Max: 36.6 (04 Oct 2020 19:31)  T(F): 97.3 (05 Oct 2020 08:02), Max: 97.8 (04 Oct 2020 19:31)  HR: 60 (05 Oct 2020 08:02) (59 - 60)  BP: 112/66 (05 Oct 2020 08:02) (109/69 - 133/60)  BP(mean): 81 (04 Oct 2020 19:31) (81 - 81)  RR: 18 (05 Oct 2020 08:02) (16 - 18)  SpO2: 97% (05 Oct 2020 08:02) (95% - 97%)    Constitutional: Pt lying in bed, awake and alert, NAD  HEENT: EOMI, normal hearing, moist mucous membranes  Neck: Soft and supple, no JVD  Respiratory: CTABL, No wheezing, rales or rhonchi  Cardiovascular: S1S2+, RRR, no M/G/R  Gastrointestinal: BS+, soft, NT/ND, no guarding, no rebound  Extremities: +1 pittng edema b/l  Vascular: 2+ peripheral pulses  Neurological: AAOx1 (name), no focal deficits  Musculoskeletal: 5/5 strength b/l upper and lower extremities  Skin: No rashes    MEDICATIONS:  MEDICATIONS  (STANDING):  atorvastatin Oral Tab/Cap - Peds 40 milliGRAM(s) Oral daily  cyproheptadine 4 milliGRAM(s) Oral every 12 hours  finasteride 5 milliGRAM(s) Oral daily  furosemide   Injectable 60 milliGRAM(s) IV Push three times a day  metoprolol succinate ER 50 milliGRAM(s) Oral daily  pantoprazole    Tablet 40 milliGRAM(s) Oral before breakfast  polyethylene glycol 3350 17 Gram(s) Oral daily  sertraline 25 milliGRAM(s) Oral daily  tamsulosin 0.4 milliGRAM(s) Oral at bedtime  timolol 0.5% Solution 1 Drop(s) Both EYES daily  warfarin 4 milliGRAM(s) Oral daily      LABS: All Labs Reviewed:                        9.1    4.66  )-----------( 202      ( 05 Oct 2020 07:50 )             28.9     10-05    136  |  103  |  64<H>  ----------------------------<  91  4.3   |  28  |  2.95<H>    Ca    9.1      05 Oct 2020 07:50    TPro  8.4<H>  /  Alb  3.3  /  TBili  0.6  /  DBili  x   /  AST  26  /  ALT  21  /  AlkPhos  89  10-04    PT/INR - ( 05 Oct 2020 07:50 )   PT: 19.0 sec;   INR: 1.68 ratio         PTT - ( 05 Oct 2020 07:50 )  PTT:32.8 sec  CARDIAC MARKERS ( 04 Oct 2020 19:59 )  <0.015 ng/mL / x     / x     / x     / x              RADIOLOGY/EKG:  < from: Xray Chest 1 View- PORTABLE-Urgent (Xray Chest 1 View- PORTABLE-Urgent .) (10.04.20 @ 20:15) >  IMPRESSION: Interstitial opacities bilaterally are without significant interval change most consistent with interstitial lung disease/interstitial pulmonary fibrosis.    < end of copied text >  < from: CT Abdomen and Pelvis No Cont (10.04.20 @ 20:42) >  IMPRESSION:    No explanation for abdominal pain on this CT.  No significant fecal load.  Diverticulosis.  Interval progression of interstitial lung disease with pulmonary fibrosis. Trace bilateral pleural effusions, right greater than left.  Cardiomegaly.    < end of copied text >   89 yo M w PMH of dementia, CHF w EF of 65% (May 2019), HTN, CAD s/p PCI, s/p pacemaker, Afib on Warfarin, BPH, depression & gastritis presented to the ED w daughter for SOB on exertion. Pt is pleasantly confused, responds appropriately but poor historian. History obtained from  previous charts and daughter Maria Antonia Berger (HCP). Daughter states that pt has had decreased mobility over the past 5 months, being relatively independent to decreasing mobility to increased SOB on exertion, ambulates on a roller. States that pt has also had decreased PO intake, decreased urination, 10 lb weight loss over the past week, since his Furosemide was increased to 80mg QD. Daughter states that pt started seeing Dr. Alfaro (Nephrologist) about 3 months ago due to "renal failure", states new baseline of Cr is 3. At bedside, pt denies chest pain, SOB, abd pain, HA, palpitations, feeling light headed or malaise.   Last admission in Sept 2019 for HF exacerbation.   In ED, pts vitals were stable, oxygenating well on RA. Pt was given 40mg Lasix.     Subjective: Pt seen at bedside. Pt doing well, no complaints. NAD.     REVIEW OF SYSTEMS:  CONSTITUTIONAL: No weakness, fevers or chills  EYES/ENT: No visual changes;  No vertigo or throat pain   NECK: No pain or stiffness  RESPIRATORY: No cough, wheezing, hemoptysis; No shortness of breath  CARDIOVASCULAR: No chest pain or palpitations  GASTROINTESTINAL: No abdominal or epigastric pain. No nausea, vomiting, or hematemesis; No diarrhea or constipation. No melena or hematochezia.  GENITOURINARY: No dysuria, frequency or hematuria  NEUROLOGICAL: No numbness or weakness  SKIN: No itching, burning, rashes, or lesions   All other review of systems is negative unless indicated above    PHYSICAL EXAM:  Vital Signs Last 24 Hrs  T(C): 36.3 (05 Oct 2020 08:02), Max: 36.6 (04 Oct 2020 19:31)  T(F): 97.3 (05 Oct 2020 08:02), Max: 97.8 (04 Oct 2020 19:31)  HR: 60 (05 Oct 2020 08:02) (59 - 60)  BP: 112/66 (05 Oct 2020 08:02) (109/69 - 133/60)  BP(mean): 81 (04 Oct 2020 19:31) (81 - 81)  RR: 18 (05 Oct 2020 08:02) (16 - 18)  SpO2: 97% (05 Oct 2020 08:02) (95% - 97%)    Constitutional: Pt lying in bed, awake and alert, NAD  HEENT: EOMI, normal hearing, moist mucous membranes  Neck: Soft and supple, no JVD  Respiratory: CTABL, No wheezing, rales or rhonchi  Cardiovascular: S1S2+, RRR, no M/G/R  Gastrointestinal: BS+, soft, NT/ND, no guarding, no rebound  Extremities: +1 pittng edema b/l  Vascular: 2+ peripheral pulses  Neurological: AAOx1 (name), no focal deficits  Musculoskeletal: 5/5 strength b/l upper and lower extremities  Skin: No rashes    MEDICATIONS:  MEDICATIONS  (STANDING):  atorvastatin Oral Tab/Cap - Peds 40 milliGRAM(s) Oral daily  cyproheptadine 4 milliGRAM(s) Oral every 12 hours  finasteride 5 milliGRAM(s) Oral daily  furosemide   Injectable 60 milliGRAM(s) IV Push three times a day  metoprolol succinate ER 50 milliGRAM(s) Oral daily  pantoprazole    Tablet 40 milliGRAM(s) Oral before breakfast  polyethylene glycol 3350 17 Gram(s) Oral daily  sertraline 25 milliGRAM(s) Oral daily  tamsulosin 0.4 milliGRAM(s) Oral at bedtime  timolol 0.5% Solution 1 Drop(s) Both EYES daily  warfarin 4 milliGRAM(s) Oral daily      LABS: All Labs Reviewed:                        9.1    4.66  )-----------( 202      ( 05 Oct 2020 07:50 )             28.9     10-05    136  |  103  |  64<H>  ----------------------------<  91  4.3   |  28  |  2.95<H>    Ca    9.1      05 Oct 2020 07:50    TPro  8.4<H>  /  Alb  3.3  /  TBili  0.6  /  DBili  x   /  AST  26  /  ALT  21  /  AlkPhos  89  10-04    PT/INR - ( 05 Oct 2020 07:50 )   PT: 19.0 sec;   INR: 1.68 ratio         PTT - ( 05 Oct 2020 07:50 )  PTT:32.8 sec  CARDIAC MARKERS ( 04 Oct 2020 19:59 )  <0.015 ng/mL / x     / x     / x     / x              RADIOLOGY/EKG:  < from: Xray Chest 1 View- PORTABLE-Urgent (Xray Chest 1 View- PORTABLE-Urgent .) (10.04.20 @ 20:15) >  IMPRESSION: Interstitial opacities bilaterally are without significant interval change most consistent with interstitial lung disease/interstitial pulmonary fibrosis.    < end of copied text >  < from: CT Abdomen and Pelvis No Cont (10.04.20 @ 20:42) >  IMPRESSION:    No explanation for abdominal pain on this CT.  No significant fecal load.  Diverticulosis.  Interval progression of interstitial lung disease with pulmonary fibrosis. Trace bilateral pleural effusions, right greater than left.  Cardiomegaly.    < end of copied text >

## 2020-10-05 NOTE — PHYSICAL THERAPY INITIAL EVALUATION ADULT - PLANNED THERAPY INTERVENTIONS, PT EVAL
Eval, amb, transfers, bed mobility x 15'./gait training/strengthening/transfer training/ROM/bed mobility training

## 2020-10-05 NOTE — H&P ADULT - ATTENDING COMMENTS
89 year old male patient with narrative as previously stated    -Admit to Tele    #Acute Respiratory failure  -CHF exacerbation likely etiology  -strict I&O  -daily weights  -salt and water restrictions  -give lasix  -get morning echo  -cardiology to evaluate pt    #CHF exacerbation  strict I&O  -daily weights  -salt and water restrictions  -give lasix  -get morning echo  -cardiology to evaluate pt      #JACQUELIN on CKD  -likely pre-renal  -encourage oral hydration  -trend kidney function    #Hyponatremia  -get morning serum osm  -encourage oral hydration    # Constipation  -start bowel regimen    # Hx of afib  - on metoprolol and on coumadin    #HTN  -on lasix    #CAD  -on BB and statin    #Depression  -on sertraline    #Debility   -PT evaluation    # Advanced Directives  -Full code    #DVT ppx  -pt on coumadin

## 2020-10-05 NOTE — CONSULT NOTE ADULT - SUBJECTIVE AND OBJECTIVE BOX
Cardiology Consultation    HPI: 89 yo M w PMH of dementia, CHF w EF of 65% (May 2019), HTN, CAD s/p PCI, s/p pacemaker, Afib on Warfarin, BPH, depression & gastritis presented to the ED w daughter for SOB on exertion. Pt is pleasantly confused, responds appropriately but poor historian. History obtained from  previous charts and daughter Maria Antonia Berger (HCP). Daughter states that pt has had decreased mobility over the past 5 months, being relatively independent to decreasing mobility to increased SOB on exertion, ambulates on a roller. States that pt has also had decreased PO intake, decreased urination, 10 lb weight loss over the past week, since his Furosemide was increased to 80mg QD. Daughter states that pt started seeing Dr. Alfaro (Nephrologist) about 3 months ago due to "renal failure", states new baseline of Cr is 3. At bedside, pt denies chest pain, SOB, abd pain, HA, palpitations, feeling light headed or malaise.   Last admission in 2019 for HF exacerbation.           PAST MEDICAL & SURGICAL HISTORY:  Congestive heart failure  Dementia  Depression  History of gastritis  CVA (cerebrovascular accident)  Over 10 years ago  BPH (benign prostatic hyperplasia)  HTN (hypertension)  Coronary artery disease  s/p PCI  Pacemaker  History of appendectomy  Over 50 years ago    Allergies  No Known Allergies    SOCIAL HISTORY: Denies tobacco, etoh abuse or illicit drug use    FAMILY HISTORY:  Patient unable to provide medical history  patient does not known parents history    MEDICATIONS  (STANDING):  atorvastatin Oral Tab/Cap - Peds 40 milliGRAM(s) Oral daily  finasteride 5 milliGRAM(s) Oral daily  furosemide   Injectable 60 milliGRAM(s) IV Push three times a day  metoprolol succinate ER 50 milliGRAM(s) Oral daily  pantoprazole    Tablet 40 milliGRAM(s) Oral before breakfast  polyethylene glycol 3350 17 Gram(s) Oral daily  sertraline 25 milliGRAM(s) Oral daily  tamsulosin 0.4 milliGRAM(s) Oral at bedtime  timolol 0.5% Solution 1 Drop(s) Both EYES daily  warfarin 4 milliGRAM(s) Oral daily    MEDICATIONS  (PRN):      Vital Signs Last 24 Hrs  T(C): 36.3 (05 Oct 2020 16:37), Max: 36.6 (04 Oct 2020 19:31)  T(F): 97.4 (05 Oct 2020 16:37), Max: 97.8 (04 Oct 2020 19:31)  HR: 62 (05 Oct 2020 16:37) (59 - 62)  BP: 128/75 (05 Oct 2020 16:37) (109/69 - 133/60)  BP(mean): 81 (04 Oct 2020 19:31) (81 - 81)  RR: 18 (05 Oct 2020 16:37) (16 - 18)  SpO2: 62% (05 Oct 2020 16:37) (62% - 97%)    REVIEW OF SYSTEMS:    CONSTITUTIONAL:  As per HPI.  HEENT:  Eyes:  No diplopia or blurred vision. ENT:  No earache, sore throat or runny nose.  CARDIOVASCULAR:  No pressure, squeezing, strangling, tightness, heaviness or aching about the chest, neck, axilla or epigastrium.  RESPIRATORY:  No cough, shortness of breath, PND or orthopnea.  GASTROINTESTINAL:  No nausea, vomiting or diarrhea.  GENITOURINARY:  No dysuria, frequency or urgency.  MUSCULOSKELETAL:  As per HPI.  NEUROLOGIC:  No paresthesias, fasciculations, seizures or weakness.      PHYSICAL EXAMINATION:    GENERAL APPEARANCE:  Pt. is not currently dyspneic, in no distress. Pt. is alert, oriented, and pleasant.  HEENT:  Pupils are normal and react normally. No icterus. Mucous membranes well colored.  NECK:  Supple. No lymphadenopathy. Jugular venous pressure not elevated. Carotids equal.   HEART:   The cardiac impulse has a normal quality. There are no murmurs, rubs or gallops noted  CHEST:  Chest is clear to auscultation. Normal respiratory effort.  ABDOMEN:  Soft and nontender.   EXTREMITIES:  There is no edema.     LABS:                        9.1    4.66  )-----------( 202      ( 05 Oct 2020 07:50 )             28.9     10-05    136  |  103  |  64<H>  ----------------------------<  91  4.3   |  28  |  2.95<H>    Ca    9.1      05 Oct 2020 07:50    TPro  8.4<H>  /  Alb  3.3  /  TBili  0.6  /  DBili  x   /  AST  26  /  ALT  21  /  AlkPhos  89  10-04    LIVER FUNCTIONS - ( 04 Oct 2020 19:59 )  Alb: 3.3 g/dL / Pro: 8.4 gm/dL / ALK PHOS: 89 U/L / ALT: 21 U/L / AST: 26 U/L / GGT: x           PT/INR - ( 05 Oct 2020 07:50 )   PT: 19.0 sec;   INR: 1.68 ratio       PTT - ( 05 Oct 2020 07:50 )  PTT:32.8 sec  CARDIAC MARKERS ( 04 Oct 2020 19:59 )  <0.015 ng/mL / x     / x     / x     / x        Urinalysis Basic - ( 04 Oct 2020 19:58 )    Color: Yellow / Appearance: Clear / S.005 / pH: x  Gluc: x / Ketone: Negative  / Bili: Negative / Urobili: Negative mg/dL   Blood: x / Protein: 15 mg/dL / Nitrite: Negative   Leuk Esterase: Negative / RBC: Negative /HPF / WBC 0-2   Sq Epi: x / Non Sq Epi: Occasional / Bacteria: Negative    EKG: Vpaced @ 62.    TELEMETRY: Not on tele    CARDIAC TESTS: pending    RADIOLOGY & ADDITIONAL STUDIES: < from: CT Abdomen and Pelvis No Cont (10.04.20 @ 20:42) >  No explanation for abdominal pain on this CT.  No significant fecal load.  Diverticulosis.  Interval progression of interstitial lung disease with pulmonary fibrosis. Trace bilateral pleural effusions, right greater than left.  Cardiomegaly.    < from: Xray Chest 1 View- PORTABLE-Urgent (Xray Chest 1 View- PORTABLE-Urgent .) (10.04.20 @ 20:15) >  IMPRESSION: Interstitial opacities bilaterally are without significant interval change most consistent with interstitial lung disease/interstitial pulmonary fibrosis.    ASSESSMENT & PLAN:

## 2020-10-05 NOTE — PHYSICAL THERAPY INITIAL EVALUATION ADULT - ACTIVE RANGE OF MOTION EXAMINATION, REHAB EVAL
Left UE Active ROM was WFL (within functional limits)/Left LE Active ROM was WFL (within functional limits)/Right LE Active ROM was WFL (within functional limits)/Right UE Active ROM was WFL (within functional limits)/Bilateral shoulder flex ~ 140 degrees, bilateral hip flex ~ 90 degrees, and bilateral DF neutral.

## 2020-10-05 NOTE — PHYSICAL THERAPY INITIAL EVALUATION ADULT - PERTINENT HX OF CURRENT PROBLEM, REHAB EVAL
Pt admitted to  secondary to SOB on exertion. COVID 19: neg. CT abd/pelvis: diverticulosis. Progression of interstitial lung disease with pulmonary fibrosis. Trace bilateral pleural effusions right>left, cardiomegaly

## 2020-10-05 NOTE — PHYSICAL THERAPY INITIAL EVALUATION ADULT - ADDITIONAL COMMENTS
Has flight of stairs at home with rail. info obtained from eval 9/2019. Has flight of stairs at home with rail. info obtained from eval 9/2019. Update 10/5: Pt stating he lives alone but also has an aide.

## 2020-10-05 NOTE — PROGRESS NOTE ADULT - ASSESSMENT
89 yo M w PMH of dementia, CHF w EF of 65% (May 2019), HTN, CAD s/p PCI, s/p pacemaker, Afib on Warfarin, BPH, depression & gastritis presented to the ED w daughter for SOB on exertion. BNP elevated at 4045, CT shows B/L pleural effusions, R>L, and 2+ pitting edema B/L. Pt also has increased Cr 3.10, daughter states this is new baseline.       #CHF exacerbation  Pt w PMH of CHF, EF 65% from May 2019, bilateral pitting edema, bilateral pleural effusions on CT.   -Continue on Lasix 40mg BID for now  -Continue Toprol XL 50mg QD  -Awaiting official Xray read  -Monitor vitals/oxygen saturation on room air  -Daily weights, water restriction up to 2L/day  -Cardio eval placed  -F/U Echo     #Acute on Chronic Renal Failure  -Cr elevated from previous admission, from 1.5 to 3  -Daughter states this is new baseline, seeing Dr. Alfaro for past 5 months  -Trend Cr    #Afib on Coumadin (CHADVASC score of 7)  -Pt in sinus rhythm, on a pacemaker  -INR 1.62, subtherapeutic.   -Daughter states home dose is 2mg warfarin  -Administered 3mg warfarin  -F/U PT/INR in AM, adjust warfarin daily    #Hyponatremia  -Na 132  -F/U Serum osm  -Encourage oral hydration    #Constipation  -Start on bowel regimen  -I&O's    #HTN  -BP today 124/62  -Continue Toprol XL 50 mg QD    #BPH  -Pt able to urinate w/o the use of a oden  -Continue home finasteride 5mg QD  -Continue home tamsulosin 0.4mg QD  -Monitor I&O's    #Depression  -Continue home Sertraline 25mg QD    #Hx of CVA  -Continue home Atorvastatin 40mg QD    #CAD s/p PCI    -Continue home Atorvastatin 40mg QD  -Not an ASA due to hx of gastritis  -On Warfarin    #Gastritis  -Denies abd pain  -Started on Protonix 40mg    #Decreased mobility  -Pt consult    #Diet  -DASH diet    #DVT PPX  -On Warfarin    #Advanced Directives  -Discussed w daughter who is HCP, pt is FULL CODE 89 yo M w PMH of dementia, CHF w EF of 65% (May 2019), HTN, CAD s/p PCI, s/p pacemaker, Afib on Warfarin, BPH, depression & gastritis presented to the ED w daughter for SOB on exertion. BNP elevated at 4045, CT shows B/L pleural effusions, R>L, and 2+ pitting edema B/L. Pt also has increased Cr 3.10, daughter states this is new baseline.     #Acute CHF exacerbation  Pt w PMH of CHF, EF 65% from May 2019, bilateral pitting edema, bilateral pleural effusions on CT.   -Continue on Lasix 40mg BID for now  -Continue Toprol XL 50mg QD  -Monitor vitals/oxygen saturation on room air  -Daily weights, water restriction up to 2L/day  -F/u cardio   -F/U echo    #Acute on Chronic Renal Failure  -Cr elevated from previous admission, from 1.5 to 3  -2.95 today  -Daughter states this is new baseline, seeing Dr. Alfaro for past 5 months  -Trend Cr    #Afib (CHADVASC score of 7)  -on metoprolol and coumaidin  -INR 1.68, subtherapeutic.   -F/U PT/INR, adjust warfarin daily    #Hyponatremia  -resolved  -Na 132-->136  -monitor    #Constipation  -Bowel regimen  -I&O's    #HTN  -Continue Toprol XL 50 mg QD    #BPH  -Continue home finasteride 5mg QD  -Continue home tamsulosin 0.4mg QD  -Monitor I&O's    #Depression  -Sertraline 25mg QD    #Hx of CVA  -Atorvastatin 40mg QD    #CAD s/p PCI    -Atorvastatin 40mg QD  -Not an ASA due to hx of gastritis  -On Warfarin    #Gastritis  -Denies abd pain  -Protonix 40mg    #Decreased mobility  -Pt consult    #Diet  -DASH diet    #DVT PPX  -On Warfarin    #Advanced Directives  -Full code, daughter is HCP. Tried to reach out to daughter about considering GOC meeting but unable to get in contact 89 yo M w PMH of dementia, CHF w EF of 65% (May 2019), HTN, CAD s/p PCI, s/p pacemaker, Afib on Warfarin, BPH, depression & gastritis presented to the ED w daughter for SOB on exertion. BNP elevated at 4045, CT shows B/L pleural effusions, R>L, and 2+ pitting edema B/L. Pt also has increased Cr 3.10, daughter states this is new baseline.     #Acute CHF exacerbation  Pt w PMH of CHF, EF 65% from May 2019, bilateral pitting edema, bilateral pleural effusions on CT.   -Continue on Lasix 40mg BID for now  -Continue Toprol XL 50mg QD  -Monitor vitals/oxygen saturation on room air  -Daily weights, water restriction up to 2L/day  -Cardio recommendations appreciated  -F/U echo    #Acute on Chronic Renal Failure  -Cr elevated from previous admission, from 1.5 to 3  -2.95 today  -Daughter states this is new baseline, seeing Dr. Alfaro for past 5 months  -Trend Cr    #Afib (CHADVASC score of 7)  -on metoprolol and coumaidin  -INR 1.68, subtherapeutic.   -F/U PT/INR, adjust warfarin daily    #Hyponatremia  -resolved  -Na 132-->136  -monitor    #Constipation  -Bowel regimen  -I&O's    #HTN  -Continue Toprol XL 50 mg QD    #BPH  -Continue home finasteride 5mg QD  -Continue home tamsulosin 0.4mg QD  -Monitor I&O's    #Depression  -Sertraline 25mg QD    #Hx of CVA  -Atorvastatin 40mg QD    #CAD s/p PCI    -Atorvastatin 40mg QD  -Not an ASA due to hx of gastritis  -On Warfarin    #Gastritis  -Denies abd pain  -Protonix 40mg    #Decreased mobility  -Pt consult    #Diet  -DASH diet    #DVT PPX  -On Warfarin    #Advanced Directives  -Full code, daughter is HCP. Tried to reach out to daughter about considering GOC meeting but unable to get in contact 89 yo M w PMH of dementia, CHF w EF of 65% (May 2019), HTN, CAD s/p PCI, s/p pacemaker, Afib on Warfarin, BPH, depression & gastritis presented to the ED w daughter for SOB on exertion. BNP elevated at 4045, CT shows B/L pleural effusions, R>L, and 2+ pitting edema B/L. Pt also has increased Cr 3.10, daughter states this is new baseline.     #Acute CHF exacerbation  Pt w PMH of CHF, EF 65% from May 2019, bilateral pitting edema, bilateral pleural effusions on CT.   -Continue on Lasix 40mg BID for now  -Continue Toprol XL 50mg QD  -Monitor vitals/oxygen saturation on room air  -Daily weights, water restriction up to 2L/day  -Cardio recommendations appreciated  -F/U echo    #Acute on Chronic Renal Failure  -Cr elevated from previous admission, from 1.5 to 3  -2.95 today  -Daughter states this is new baseline, seeing Dr. Alfaro for past 5 months  -Trend Cr    #Afib (CHADVASC score of 7)  -on metoprolol and coumaidin  -INR 1.68, subtherapeutic.   -F/U PT/INR, adjust warfarin daily    #Hyponatremia  -resolved  -Na 132-->136  -monitor    #Constipation  -Bowel regimen  -I&O's    #HTN  -Continue Toprol XL 50 mg QD    #BPH  -Continue home finasteride 5mg QD  -Continue home tamsulosin 0.4mg QD  -Monitor I&O's    #Depression  -Sertraline 25mg QD    #Hx of CVA  -Atorvastatin 40mg QD    #CAD s/p PCI    -Atorvastatin 40mg QD  -Not an ASA due to hx of gastritis  -On Warfarin    #Gastritis  -Denies abd pain  -Protonix 40mg    #Decreased mobility  -Pt consult    #Diet  -DASH diet    #DVT PPX  -On Warfarin    #Advanced Directives  -Full code, daughter is HCP. Spoke to daughter today regarding code status and consideration of GOC meeting. Pt still would like full code and no GOC meeting at this time. 89 yo M w PMH of dementia, CHF w EF of 65% (May 2019), HTN, CAD s/p PCI, s/p pacemaker, Afib on Warfarin, BPH, depression & gastritis presented to the ED w daughter for SOB on exertion. BNP elevated at 4045, CT shows B/L pleural effusions, R>L, and 2+ pitting edema B/L. Pt also has increased Cr 3.10, daughter states this is new baseline.     #Acute CHF exacerbation  Pt w PMH of CHF, EF 65% from May 2019, bilateral pitting edema, bilateral pleural effusions on CT.   -Continue on Lasix 40mg BID for now  -Continue Toprol XL 50mg QD  -Monitor vitals/oxygen saturation on room air  -Daily weights, water restriction up to 2L/day  -Cardio recommendations appreciated  -F/U echo    #Acute on Chronic Renal Failure  -Cr elevated from previous admission, from 1.5 to 3  -2.95 today  -Daughter states this is new baseline, seeing Dr. Alfaro for past 5 months  -Trend Cr    #Afib (CHADVASC score of 7)  -on metoprolol and coumaidin  -INR 1.68, subtherapeutic.   -F/U PT/INR, adjust warfarin daily    #Hyponatremia  -resolved  -Na 132-->136  -monitor    #Constipation  -Bowel regimen  -I&O's    #HTN  -Continue Toprol XL 50 mg QD    #BPH  -Continue home finasteride 5mg QD  -Continue home tamsulosin 0.4mg QD  -Monitor I&O's    #Depression  -Sertraline 25mg QD    #Hx of CVA  -Atorvastatin 40mg QD    #CAD s/p PCI    -Atorvastatin 40mg QD  -Not an ASA due to hx of gastritis  -On Warfarin    #Gastritis  -Denies abd pain  -Protonix 40mg    #Decreased mobility  -Pt consult    #Diet  -DASH diet    #DVT PPX  -On Warfarin    #Advanced Directives  -Full code, daughter is HCP. Spoke to daughter today regarding code status and consideration of GOC meeting. Pt still would like full code and no GOC meeting at this time. States that she will speak to her two brothers and let know if change mind.

## 2020-10-05 NOTE — H&P ADULT - NSICDXFAMILYHX_GEN_ALL_CORE_FT
FAMILY HISTORY:  Patient unable to provide medical history, patient does not known parents history

## 2020-10-05 NOTE — PHYSICAL THERAPY INITIAL EVALUATION ADULT - DIAGNOSIS, PT EVAL
SOB on exertion, CHF exacerbation, acute on chronic kidney disease, a-fib, hyponatremia SOB on exertion, CHF exacerbation, acute on chronic renal failure, a-fib, hyponatremia

## 2020-10-05 NOTE — PROGRESS NOTE ADULT - ASSESSMENT
Pt has been seen and examined with FP resident, resident supervised agree with a/p       Patient is a 89y old  Male who presents with a chief complaint of Acute respiratory failure (05 Oct 2020 01:13)          PHYSICAL EXAM:  Vital Signs Last 24 Hrs  T(C): 36.3 (05 Oct 2020 08:02), Max: 36.6 (04 Oct 2020 19:31)  T(F): 97.3 (05 Oct 2020 08:02), Max: 97.8 (04 Oct 2020 19:31)  HR: 60 (05 Oct 2020 08:02) (59 - 60)  BP: 112/66 (05 Oct 2020 08:02) (109/69 - 133/60)  BP(mean): 81 (04 Oct 2020 19:31) (81 - 81)  RR: 18 (05 Oct 2020 08:02) (16 - 18)  SpO2: 97% (05 Oct 2020 08:02) (95% - 97%)  -rs-aeeb, cta  -cvs-s1s2 normal   -p/a-soft,bs+      A/P    #give extra dose of lasix with monitoring of electrolytes     #dvt pr

## 2020-10-06 NOTE — DISCHARGE NOTE PROVIDER - PROVIDER TOKENS
PROVIDER:[TOKEN:[[id number]]],PROVIDER:[TOKEN:[[id number]]] PROVIDER:[TOKEN:[[id number]]],PROVIDER:[TOKEN:[[id number]]]

## 2020-10-06 NOTE — PROGRESS NOTE ADULT - SUBJECTIVE AND OBJECTIVE BOX
Patient is a 89y old  Male who presents with a chief complaint of Acute respiratory failure.       HPI:  89 yo M w PMH of dementia, CHF w EF of 65% (May 2019), HTN, CAD s/p PCI, s/p pacemaker, Afib on Warfarin, BPH, depression & gastritis presented to the ED w daughter for SOB on exertion.    He was diagnosed with CHF and was being treated with iv diuretics.  This am he denies any CP or SOB.      PAST MEDICAL & SURGICAL HISTORY:  Congestive heart failure    Dementia    Depression    History of gastritis    CVA (cerebrovascular accident)  Over 10 years ago    BPH (benign prostatic hyperplasia)    HTN (hypertension)    Coronary artery disease  s/p PCI    Pacemaker    S/P coronary artery stent placement    History of appendectomy  Over 50 years ago        MEDICATIONS  (STANDING):  atorvastatin Oral Tab/Cap - Peds 40 milliGRAM(s) Oral daily  finasteride 5 milliGRAM(s) Oral daily  furosemide   Injectable 60 milliGRAM(s) IV Push three times a day  metoprolol succinate ER 50 milliGRAM(s) Oral daily  pantoprazole    Tablet 40 milliGRAM(s) Oral before breakfast  polyethylene glycol 3350 17 Gram(s) Oral daily  sertraline 25 milliGRAM(s) Oral daily  tamsulosin 0.4 milliGRAM(s) Oral at bedtime  timolol 0.5% Solution 1 Drop(s) Both EYES daily  warfarin 4 milliGRAM(s) Oral daily    MEDICATIONS  (PRN):      FAMILY HISTORY:  Patient unable to provide medical history  patient does not known parents history        SOCIAL HISTORY: no recent smoking     REVIEW OF SYSTEMS:  CONSTITUTIONAL:    No fatigue, malaise, lethargy.  No fever or chills.  RESPIRATORY:  No cough.  No wheeze.  No hemoptysis.  No shortness of breath.  CARDIOVASCULAR:  No chest pains.  No palpitations. No shortness of breath, No orthopnea or PND.  GASTROINTESTINAL:  No abdominal pain.  No nausea or vomiting.    GENITOURINARY:    No hematuria.    MUSCULOSKELETAL:  No musculoskeletal pain.  No joint swelling.  No arthritis.  NEUROLOGICAL:  No tingling or numbness or weakness.  PSYCHIATRIC:  No confusion  SKIN:  No rashes.             Vital Signs Last 24 Hrs  T(C): 36.3 (06 Oct 2020 08:30), Max: 36.6 (05 Oct 2020 21:14)  T(F): 97.4 (06 Oct 2020 08:30), Max: 97.9 (05 Oct 2020 21:14)  HR: 60 (06 Oct 2020 08:30) (60 - 62)  BP: 106/53 (06 Oct 2020 08:30) (106/53 - 128/75)  BP(mean): --  RR: 17 (06 Oct 2020 08:30) (16 - 18)  SpO2: 93% (06 Oct 2020 08:30) (62% - 98%)    PHYSICAL EXAM-    Constitutional:elderly frail ill looking male in no acute distress    Head: Head is normocephalic and atraumatic.      Neck:  No JVD.     Cardiovascular: Regular rate and rhythm without S3, S4. No murmurs or rubs are appreciated.      Respiratory: Breathsounds are normal. No rales. No wheezing.    Abdomen: Soft, nontender, nondistended with positive bowel sounds.      Extremity: No tenderness. 2+ b/l pedal  pitting edema     Neurologic: The patient is alert and oriented.      Skin: No rash, no obvious lesions noted.      Psychiatric: The patient appears to be emotionally stable.      INTERPRETATION OF TELEMETRY: not on     ECG: V paced [facility]    I&O's Detail      LABS:                        8.9    4.97  )-----------( 176      ( 06 Oct 2020 07:42 )             28.3     10-06    138  |  104  |  73<H>  ----------------------------<  86  4.1   |  27  |  3.22<H>    Ca    9.0      06 Oct 2020 07:42  Phos  4.1     10-06    TPro  x   /  Alb  3.0<L>  /  TBili  x   /  DBili  x   /  AST  x   /  ALT  x   /  AlkPhos  x   10-06    CARDIAC MARKERS ( 04 Oct 2020 19:59 )  <0.015 ng/mL / x     / x     / x     / x          PT/INR - ( 06 Oct 2020 07:42 )   PT: 19.2 sec;   INR: 1.68 ratio         PTT - ( 06 Oct 2020 07:42 )  PTT:34.7 sec  Urinalysis Basic - ( 04 Oct 2020 19:58 )    Color: Yellow / Appearance: Clear / S.005 / pH: x  Gluc: x / Ketone: Negative  / Bili: Negative / Urobili: Negative mg/dL   Blood: x / Protein: 15 mg/dL / Nitrite: Negative   Leuk Esterase: Negative / RBC: Negative /HPF / WBC 0-2   Sq Epi: x / Non Sq Epi: Occasional / Bacteria: Negative      I&O's Summary    BNP  RADIOLOGY & ADDITIONAL STUDIES:  < from: Xray Chest 1 View- PORTABLE-Urgent (Xray Chest 1 View- PORTABLE-Urgent .) (10.04.20 @ 20:15) >    IMPRESSION: Interstitial opacities bilaterally are without significant interval change most consistent with interstitial lung disease/interstitial pulmonary fibrosis.              KARON CAVAZOS M.D., ATTENDING RADIOLOGIST  This document has been electronically signed. Oct  5 2020  9:07AM    < end of copied text >  < from: TTE Echo Complete w/o Contrast w/ Doppler (10.05.20 @ 21:28) >     Impression     Summary     Estimated left ventricular ejection fraction is 55-60 %.   Mild concentric left ventricular hypertrophy is present.   The left ventricle is normal in size, wall motion and contractility as   seen in limited views.   The left atrium is moderately dilated.   An interatrial septal aneurysm is noted. There is no evidence of ASD.   Fibrocalcific changes noted to the Aortic valve leaflets with preserved   leaflet excursion.   Themitral valve leaflets appear thin and normal.   Mild (1+) mitral regurgitation is present.   EA reversal of the mitral inflow consistent with reduced compliance of the   left ventricle.   The mitral chordae tendineae appears calcified.   Normal appearing tricuspid valve structure.   Moderate (2+) tricuspid valve regurgitation is present.   Moderate pulmonary hypertension.     Signature    < end of copied text >

## 2020-10-06 NOTE — DISCHARGE NOTE PROVIDER - NSDCCPCAREPLAN_GEN_ALL_CORE_FT
PRINCIPAL DISCHARGE DIAGNOSIS  Diagnosis: CHF exacerbation  Assessment and Plan of Treatment: -You were admitted with acute exacerbation of heart failure.   -We gave you lasix to help with water retention and to relieve your symptoms  -Please followup with your cardiologist and nephrologist as outpatient  DISCHARGE INSTRUCTIONS:  Call your local emergency number ([phone] in the [place]) if:  -You have any of the following signs of a heart attack: ?Squeezing, pressure, or pain in your chest  -You may also have any of the following:?Discomfort or pain in your back, neck, jaw, stomach, or arm  -Shortness of breath  -Nausea or vomiting  -Lightheadedness or a sudden cold sweat      SECONDARY DISCHARGE DIAGNOSES  Diagnosis: JACQUELIN (acute kidney injury)  Assessment and Plan of Treatment: -We monitored you creatinine while you were on lasix-  -We consulted Dr. Alfaro  -Please followup with Dr. Alfaro as outpatient     PRINCIPAL DISCHARGE DIAGNOSIS  Diagnosis: CHF exacerbation  Assessment and Plan of Treatment: -You were admitted with acute exacerbation of heart failure.   -We gave you lasix to help with fluid retention and to relieve your symptoms  -We consulted your cardiologist and nephrologist who evaluated you  -Please followup with your cardiologist and nephrologist as outpatient  DISCHARGE INSTRUCTIONS:  Call your local emergency number ([phone] in the [place]) if:  -You have any of the following signs of a heart attack: ?Squeezing, pressure, or pain in your chest  -You may also have any of the following:?Discomfort or pain in your back, neck, jaw, stomach, or arm  -Shortness of breath  -Nausea or vomiting  -Lightheadedness or a sudden cold sweat      SECONDARY DISCHARGE DIAGNOSES  Diagnosis: JACQUELIN (acute kidney injury)  Assessment and Plan of Treatment: -Your creatinine was found to be elevated  -We monitored your creatinine while you were on lasix  -We consulted your nephrologist Dr. Alfaro and as per nephrology your renal function stable and acutely increased with use of diuretic medication  -Please followup with Dr. Alfaro as outpatient     PRINCIPAL DISCHARGE DIAGNOSIS  Diagnosis: CHF exacerbation  Assessment and Plan of Treatment: -You were admitted with acute exacerbation of heart failure.   -We gave you lasix to help with fluid retention and to relieve your symptoms  -We consulted your cardiologist and nephrologist who evaluated you  -We will send you home with your home dose of lasix 80 mg PO  -Please followup with your cardiologist and nephrologist as outpatient  DISCHARGE INSTRUCTIONS:  Call your local emergency number ([phone] in the [place]) if:  -You have any of the following signs of a heart attack: ?Squeezing, pressure, or pain in your chest  -You may also have any of the following:?Discomfort or pain in your back, neck, jaw, stomach, or arm  -Shortness of breath  -Nausea or vomiting  -Lightheadedness or a sudden cold sweat      SECONDARY DISCHARGE DIAGNOSES  Diagnosis: BPH (benign prostatic hyperplasia)  Assessment and Plan of Treatment: -Continue home finasteride 5mg QD  -Continue home tamsulosin 0.4mg QD    Diagnosis: Hypertension  Assessment and Plan of Treatment: Continue Toprol XL 50 mg QD     PRINCIPAL DISCHARGE DIAGNOSIS  Diagnosis: CHF exacerbation  Assessment and Plan of Treatment: -You were admitted with acute exacerbation of heart failure.   -We gave you lasix to help with fluid retention and to relieve your symptoms  -We consulted your cardiologist and nephrologist who evaluated you  -We will send you home with your home dose of lasix 80 mg PO  -Please followup with your cardiologist and nephrologist as outpatient  DISCHARGE INSTRUCTIONS:  Call your local emergency number ([phone] in the [place]) if:  -You have any of the following signs of a heart attack: ?Squeezing, pressure, or pain in your chest  -You may also have any of the following:?Discomfort or pain in your back, neck, jaw, stomach, or arm  -Shortness of breath  -Nausea or vomiting  -Lightheadedness or a sudden cold sweat      SECONDARY DISCHARGE DIAGNOSES  Diagnosis: Atrial fibrillation  Assessment and Plan of Treatment: Continue taking coumadin as prescribed  -F/u with your PCP and cardiologist    Diagnosis: BPH (benign prostatic hyperplasia)  Assessment and Plan of Treatment: -Continue home finasteride 5mg QD  -Continue home tamsulosin 0.4mg QD    Diagnosis: Hypertension  Assessment and Plan of Treatment: Continue Toprol XL 50 mg QD

## 2020-10-06 NOTE — DISCHARGE NOTE PROVIDER - HOSPITAL COURSE
HPI: 89 y/o M w PMH of dementia, CHF w EF of 65% (May 2019), HTN, CAD s/p PCI, s/p pacemaker, Afib on Warfarin, BPH, depression & gastritis who presented to ED with daughter for SOB on exertion. Daughter states that pt has had decreased mobility over the past 5 months, being relatively independent to decreasing mobility to increased SOB on exertion, ambulates on a roller. States that pt has also had decreased PO intake, decreased urination, 10 lb weight loss over the past week, since his Furosemide was increased to 80mg QD. Daughter states that pt started seeing Dr. Alfaro (Nephrologist) about 3 months ago due to "renal failure", states new baseline of Cr is 3. In ED, pts vitals were stable, oxygenating well on RA. Pt was given 40mg Lasix. Pt evaluated by cardiology, rec continue IV diuresis. Pt was put on lasix 60 mg TID. Pt evaluated by nephrologist Dr. Alfaro today who rec taper IV lasix to BID dose and thereafter PO. Pt hemdoynamically stable for discharge, should followup with his cardiologist and nephrologist as outpatient.         RADIOLOGY:    < from: Xray Chest 1 View- PORTABLE-Urgent (Xray Chest 1 View- PORTABLE-Urgent .) (10.04.20 @ 20:15) >      IMPRESSION: Interstitial opacities bilaterally are without significant interval change most consistent with interstitial lung disease/interstitial pulmonary fibrosis.    < end of copied text >    < from: CT Abdomen and Pelvis No Cont (10.04.20 @ 20:42) >    IMPRESSION:    No explanation for abdominal pain on this CT.  No significant fecal load.  Diverticulosis.  Interval progression of interstitial lung disease with pulmonary fibrosis. Trace bilateral pleural effusions, right greater than left.  Cardiomegaly.    < end of copied text >     HPI: 89 y/o M w PMH of dementia, CHF w EF of 65% (May 2019), HTN, CAD s/p PCI, s/p pacemaker, Afib on Warfarin, BPH, depression & gastritis who presented to ED with daughter for SOB on exertion. Daughter states that pt has had decreased mobility over the past 5 months, being relatively independent to decreasing mobility to increased SOB on exertion, ambulates on a roller. States that pt has also had decreased PO intake, decreased urination, 10 lb weight loss over the past week, since his Furosemide was increased to 80mg QD. Daughter states that pt started seeing Dr. Alfaro (Nephrologist) about 3 months ago due to renal failure, states new baseline of Cr is 3. In ED, pts vitals were stable, oxygenating well on RA. Pt was given 40mg Lasix. Chext x-ray and CT abdomen done.  Pt evaluated by cardiology, rec continue IV diuresis. Pt was put on lasix 60 mg TID. Pt evaluated by nephrologist Dr. Alfaro who rec taper IV lasix to BID dose and then to PO soon after. Pt is currently hemdoynamically stable for discharge, should followup with his cardiologist and nephrologist as outpatient.         RADIOLOGY:    < from: Xray Chest 1 View- PORTABLE-Urgent (Xray Chest 1 View- PORTABLE-Urgent .) (10.04.20 @ 20:15) >      IMPRESSION: Interstitial opacities bilaterally are without significant interval change most consistent with interstitial lung disease/interstitial pulmonary fibrosis.    < end of copied text >    < from: CT Abdomen and Pelvis No Cont (10.04.20 @ 20:42) >    IMPRESSION:    No explanation for abdominal pain on this CT.  No significant fecal load.  Diverticulosis.  Interval progression of interstitial lung disease with pulmonary fibrosis. Trace bilateral pleural effusions, right greater than left.  Cardiomegaly.    < end of copied text >     HPI: 89 y/o M w PMH of dementia, CHF w EF of 65% (May 2019), HTN, CAD s/p PCI, s/p pacemaker, Afib on Warfarin, BPH, depression & gastritis who presented to ED with daughter for SOB on exertion. Daughter states that pt has had decreased mobility over the past 5 months, being relatively independent to decreasing mobility to increased SOB on exertion, ambulates on a roller. States that pt has also had decreased PO intake, decreased urination, 10 lb weight loss over the past week, since his Furosemide was increased to 80mg QD. Daughter states that pt started seeing Dr. Alfaro (Nephrologist) about 3 months ago due to renal failure, states new baseline of Cr is 3. In ED, pts vitals were stable, oxygenating well on RA. Pt was given 40mg Lasix. Chext x-ray and CT abdomen done.  Pt evaluated by cardiology, rec continue IV diuresis. Pt was put on lasix 60 mg TID. Pt evaluated by nephrologist Dr. Alfaro who rec taper IV lasix to BID dose and then to PO soon after. Pt switched home dose lasix 80 mg PO. Pt is currently hemodynamically stable for discharge, should followup with his cardiologist and nephrologist as outpatient.     Subjective: Pt seen at bedside. Pt doing well. NAD. Hemodynamically stable     Vitals  T(F): 97.9 (10-08-20 @ 08:26), Max: 98 (10-07-20 @ 21:30)  HR: 60 (10-08-20 @ 08:26) (60 - 60)  BP: 111/44 (10-08-20 @ 08:26) (100/42 - 114/74)  RR: 17 (10-08-20 @ 08:26) (16 - 17)  SpO2: 93% (10-08-20 @ 08:26) (93% - 98%)    Physical Exam   Gen: NAD, comfortable  HENT: atraumatic head and ears, no gross abnormalities of ears, mucous membranes moist,  neck supple without masses/goiter/lymphadenopathy  CV: RRR, nl s1/s2, no M/R/G  Pulm: nl respiratory effort, CTAB, no wheezes/crackles/rhonchi  Back: no scoliosis, lordosis, or kyphosis, no tenderness  Abd: normoactive bowel sounds in all 4 quadrants, soft, nontender, nondistended, no rebound, no guarding, no masses  Extremities: no pedal edema, pedal pulses palpable   Skin: nl warm and dry, no wounds   Neuro: A&Ox3, answering questions appropriately, PERRL, EOMI, / strength in upper and lower extremities bilaterally  Pysch: no depression, no SI, no HI      RADIOLOGY:    < from: Xray Chest 1 View- PORTABLE-Urgent (Xray Chest 1 View- PORTABLE-Urgent .) (10.04.20 @ 20:15) >      IMPRESSION: Interstitial opacities bilaterally are without significant interval change most consistent with interstitial lung disease/interstitial pulmonary fibrosis.    < end of copied text >    < from: CT Abdomen and Pelvis No Cont (10.04.20 @ 20:42) >    IMPRESSION:    No explanation for abdominal pain on this CT.  No significant fecal load.  Diverticulosis.  Interval progression of interstitial lung disease with pulmonary fibrosis. Trace bilateral pleural effusions, right greater than left.  Cardiomegaly.    < end of copied text >

## 2020-10-06 NOTE — DISCHARGE NOTE PROVIDER - NSDCMRMEDTOKEN_GEN_ALL_CORE_FT
atorvastatin 40 mg oral tablet: 1 tab(s) orally once a day  cefuroxime 500 mg oral tablet: 1 tab(s) orally 2 times a day   finasteride 5 mg oral tablet: 1 tab(s) orally once a day  metoprolol succinate 25 mg oral tablet, extended release: 0.5 tab(s) orally 2 times a day  raNITIdine 300 mg oral capsule: 1 cap(s) orally once a day (at bedtime)  sertraline 25 mg oral tablet: 1 tab(s) orally once a day  tamsulosin 0.4 mg oral capsule: 1 cap(s) orally once a day  timolol maleate 0.5% ophthalmic solution: 1 drop(s) in each eye once a day (in the morning)  Vitamin D3 50,000 intl units oral capsule: 1 cap(s) orally once a week on Sunday  warfarin 3 mg oral tablet: 1 tab(s) orally once a day  **Med HELD 9/7 and 9/8 - per md and supposed to restart 9/9 but did not take dose today**   atorvastatin 40 mg oral tablet: 1 tab(s) orally once a day  finasteride 5 mg oral tablet: 1 tab(s) orally once a day  furosemide 80 mg oral tablet: 1 tab(s) orally once a day  metoprolol succinate 25 mg oral tablet, extended release: 0.5 tab(s) orally 2 times a day  raNITIdine 300 mg oral capsule: 1 cap(s) orally once a day (at bedtime)  sertraline 25 mg oral tablet: 1 tab(s) orally once a day  tamsulosin 0.4 mg oral capsule: 1 cap(s) orally once a day  timolol maleate 0.5% ophthalmic solution: 1 drop(s) in each eye once a day (in the morning)  Vitamin D3 50,000 intl units oral capsule: 1 cap(s) orally once a week on Sunday  warfarin 3 mg oral tablet: 1 tab(s) orally once a day  **Med HELD 9/7 and 9/8 - per md and supposed to restart 9/9 but did not take dose today**

## 2020-10-06 NOTE — CONSULT NOTE ADULT - ASSESSMENT
89dementia, CHF w EF of 65% (May 2019), HTN, CAD s/p PCI, s/p pacemaker, Afib on Warfarin, BPH, depression CKD IV baseline near 3, recent JACQUELIN secondary to overdiuresis  presented to the ED w daughter for SOB on exertion.     CKD IV  -Renal function stable, subtle rise with diuresis  -Would taper down on diuretics to bid IV dosing and quickly therafter to PO  -Trend labs/lytes  -NSAID avoidance    CM  -[facility] following, lasix ongoing  -follow weights, labs, I's and O's as able    anemia  -Would check workup, fe/ferritin/tibc    thanks, will follow with you  d/c with RN staff, medical residents

## 2020-10-06 NOTE — CONSULT NOTE ADULT - SUBJECTIVE AND OBJECTIVE BOX
Patient is a 89y Male whom presented to the hospital with dementia, CHF w EF of 65% (May 2019), HTN, CAD s/p PCI, s/p pacemaker, Afib on Warfarin, BPH, depression CKD IV baseline near 3, recent JACQUELIN secondary to overdiuresis  presented to the ED w daughter for SOB on exertion. Patient has had decreased mobility over the past 5 months, and apparent 10 lb weight loss over the past week, since his Furosemide was increased to 80mg QD. Patient admitted despite this and started on high dose IV diuretics, 60 tid lasix IV.     PAST MEDICAL & SURGICAL HISTORY:  Congestive heart failure    Dementia    Depression    History of gastritis    CVA (cerebrovascular accident)  Over 10 years ago    BPH (benign prostatic hyperplasia)    HTN (hypertension)    Coronary artery disease  s/p PCI    Pacemaker    S/P coronary artery stent placement    History of appendectomy  Over 50 years ago        MEDICATIONS  (STANDING):  atorvastatin Oral Tab/Cap - Peds 40 milliGRAM(s) Oral daily  finasteride 5 milliGRAM(s) Oral daily  furosemide   Injectable 60 milliGRAM(s) IV Push three times a day  metoprolol succinate ER 50 milliGRAM(s) Oral daily  pantoprazole    Tablet 40 milliGRAM(s) Oral before breakfast  polyethylene glycol 3350 17 Gram(s) Oral daily  sertraline 25 milliGRAM(s) Oral daily  tamsulosin 0.4 milliGRAM(s) Oral at bedtime  timolol 0.5% Solution 1 Drop(s) Both EYES daily  warfarin 4 milliGRAM(s) Oral daily    MEDICATIONS  (PRN):      Allergies    No Known Allergies    Intolerances        SOCIAL HISTORY:  no etoh/cigg    FAMILY HISTORY:  Patient unable to provide medical history  patient does not known parents history        REVIEW OF SYSTEMS:    CONSTITUTIONAL: stable weakness, fevers or chills  EYES/ENT: No visual changes;  No vertigo or throat pain   NECK: No pain or stiffness  RESPIRATORY: No cough, wheezing, hemoptysis; stable shortness of breath  CARDIOVASCULAR: No chest pain or palpitations  GASTROINTESTINAL: No abdominal or epigastric pain. No nausea, vomiting, or hematemesis; No diarrhea or constipation. No melena or hematochezia.  GENITOURINARY: No dysuria, frequency or hematuria  NEUROLOGICAL: No numbness or weakness  SKIN: No itching, burning, rashes, or lesions   All other review of systems is negative unless indicated above.      T(C): , Max: 36.6 (10-05-20 @ 21:14)  T(F): , Max: 97.9 (10-05-20 @ 21:14)  HR: 60 (10-06-20 @ 08:30)  BP: 106/53 (10-06-20 @ 08:30)  BP(mean): --  RR: 17 (10-06-20 @ 08:30)  SpO2: 93% (10-06-20 @ 08:30)  Wt(kg): --        PHYSICAL EXAM:    Constitutional: NAD, frail  HEENT: dry MM  Neck: No LAD, No JVD  Respiratory: CTAB  Cardiovascular: S1 and S2  Extremities: chronic  peripheral edema  Neurological: Alert, pleasent  : No Mercedes  Skin: No rashes  Access: Not applicable        LABS:                        8.9    4.97  )-----------( 176      ( 06 Oct 2020 07:42 )             28.3     06 Oct 2020 07:42    138    |  104    |  73     ----------------------------<  86     4.1     |  27     |  3.22   05 Oct 2020 16:21    135    |  102    |  67     ----------------------------<  102    4.9     |  24     |  3.28   05 Oct 2020 07:50    136    |  103    |  64     ----------------------------<  91     4.3     |  28     |  2.95   04 Oct 2020 19:59    132    |  100    |  69     ----------------------------<  115    4.8     |  25     |  3.10     Ca    9.0        06 Oct 2020 07:42  Ca    9.2        05 Oct 2020 16:21  Ca    9.1        05 Oct 2020 07:50  Ca    9.1        04 Oct 2020 19:59  Phos  4.1       06 Oct 2020 07:42  Phos  4.3       05 Oct 2020 16:21    TPro  x      /  Alb  3.0    /  TBili  x      /  DBili  x      /  AST  x      /  ALT  x      /  AlkPhos  x      06 Oct 2020 07:42  TPro  x      /  Alb  3.4    /  TBili  x      /  DBili  x      /  AST  x      /  ALT  x      /  AlkPhos  x      05 Oct 2020 16:21  TPro  8.4    /  Alb  3.3    /  TBili  0.6    /  DBili  x      /  AST  26     /  ALT  21     /  AlkPhos  89     04 Oct 2020 19:59          Urine Studies:  Urinalysis Basic - ( 04 Oct 2020 19:58 )    Color: Yellow / Appearance: Clear / S.005 / pH: x  Gluc: x / Ketone: Negative  / Bili: Negative / Urobili: Negative mg/dL   Blood: x / Protein: 15 mg/dL / Nitrite: Negative   Leuk Esterase: Negative / RBC: Negative /HPF / WBC 0-2   Sq Epi: x / Non Sq Epi: Occasional / Bacteria: Negative            RADIOLOGY & ADDITIONAL STUDIES:

## 2020-10-06 NOTE — DISCHARGE NOTE PROVIDER - CARE PROVIDERS DIRECT ADDRESSES
[email or web address]

## 2020-10-06 NOTE — DISCHARGE NOTE PROVIDER - CARE PROVIDER_API CALL
Joseph Alfaro  INTERNAL MEDICINE  [address] [place] [address]  Phone: [phone]  Fax: [phone]  Follow Up Time:     Sg Washington)  Cardiovascular Disease; Nuclear Cardiology  [address] [place] [address]  Phone: [phone]  Fax: [phone]  Follow Up Time:    Joseph Alfaro  INTERNAL MEDICINE  [address] [place] [address]  Phone: [phone]  Fax: [phone]  Follow Up Time:     Michelle Lopez  CARDIOVASCULAR DISEASE  [address] [place] [address]  Phone: [phone]  Fax: [phone]  Follow Up Time:

## 2020-10-06 NOTE — PROGRESS NOTE ADULT - ASSESSMENT
Pt has been seen and examined with FP resident, resident supervised agree with a/p       Patient is a 89y old  Male who presents with a chief complaint of Acute respiratory failure (05 Oct 2020 01:13)          PHYSICAL EXAM:    -rs-aeeb, cta  -cvs-s1s2 normal   -p/a-soft,bs+      A/P    #ct lasix and monitoring     #dvt pr

## 2020-10-06 NOTE — PROGRESS NOTE ADULT - SUBJECTIVE AND OBJECTIVE BOX
89 yo M w PMH of dementia, CHF w EF of 65% (May 2019), HTN, CAD s/p PCI, s/p pacemaker, Afib on Warfarin, BPH, depression & gastritis presented to the ED w daughter for SOB on exertion. Pt is pleasantly confused, responds appropriately but poor historian. History obtained from  previous charts and daughter Maria Antonia Berger (HCP). Daughter states that pt has had decreased mobility over the past 5 months, being relatively independent to decreasing mobility to increased SOB on exertion, ambulates on a roller. States that pt has also had decreased PO intake, decreased urination, 10 lb weight loss over the past week, since his Furosemide was increased to 80mg QD. Daughter states that pt started seeing Dr. Alfaro (Nephrologist) about 3 months ago due to "renal failure", states new baseline of Cr is 3. At bedside, pt denies chest pain, SOB, abd pain, HA, palpitations, feeling light headed or malaise.   Last admission in Sept 2019 for HF exacerbation.   In ED, pts vitals were stable, oxygenating well on RA. Pt was given 40mg Lasix.     Subjective: Pt seen at bedside. Pt doing well, no complaints. NAD. Denies SOB, chest pain.    REVIEW OF SYSTEMS:  CONSTITUTIONAL: No weakness, fevers or chills  EYES/ENT: No visual changes;  No vertigo or throat pain   NECK: No pain or stiffness  RESPIRATORY: No cough, wheezing, hemoptysis; No shortness of breath  CARDIOVASCULAR: No chest pain or palpitations  GASTROINTESTINAL: No abdominal or epigastric pain. No nausea, vomiting, or hematemesis; No diarrhea or constipation. No melena or hematochezia.  GENITOURINARY: No dysuria, frequency or hematuria  NEUROLOGICAL: No numbness or weakness  SKIN: No itching, burning, rashes, or lesions   All other review of systems is negative unless indicated above    Vital Signs Last 24 Hrs  T(C): 36.3 (06 Oct 2020 15:27), Max: 36.6 (05 Oct 2020 21:14)  T(F): 97.4 (06 Oct 2020 15:27), Max: 97.9 (05 Oct 2020 21:14)  HR: 60 (06 Oct 2020 15:27) (60 - 62)  BP: 111/50 (06 Oct 2020 15:27) (106/53 - 128/75)  BP(mean): --  RR: 18 (06 Oct 2020 15:27) (16 - 18)  SpO2: 98% (06 Oct 2020 15:27) (62% - 98%)    PHYSICAL EXAM:  Constitutional: Pt lying in bed, awake and alert, NAD  HEENT: EOMI, normal hearing, moist mucous membranes  Neck: Soft and supple, no JVD  Respiratory: CTABL, No wheezing, rales or rhonchi  Cardiovascular: S1S2+, RRR, no M/G/R  Gastrointestinal: BS+, soft, NT/ND, no guarding, no rebound  Extremities: +1 pittng edema b/l  Vascular: 2+ peripheral pulses  Neurological: AAOx1 (name), no focal deficits  Musculoskeletal: 5/5 strength b/l upper and lower extremities  Skin: No rashes    MEDICATIONS:  MEDICATIONS  (STANDING):  atorvastatin Oral Tab/Cap - Peds 40 milliGRAM(s) Oral daily  cyproheptadine 4 milliGRAM(s) Oral every 12 hours  finasteride 5 milliGRAM(s) Oral daily  furosemide   Injectable 60 milliGRAM(s) IV Push three times a day  metoprolol succinate ER 50 milliGRAM(s) Oral daily  pantoprazole    Tablet 40 milliGRAM(s) Oral before breakfast  polyethylene glycol 3350 17 Gram(s) Oral daily  sertraline 25 milliGRAM(s) Oral daily  tamsulosin 0.4 milliGRAM(s) Oral at bedtime  timolol 0.5% Solution 1 Drop(s) Both EYES daily  warfarin 4 milliGRAM(s) Oral daily      LABS: All Labs Reviewed:                        8.9    4.97  )-----------( 176      ( 06 Oct 2020 07:42 )             28.3   10-06    138  |  104  |  73<H>  ----------------------------<  86  4.1   |  27  |  3.22<H>    Ca    9.0      06 Oct 2020 07:42  Phos  4.1     10-06    TPro  x   /  Alb  3.0<L>  /  TBili  x   /  DBili  x   /  AST  x   /  ALT  x   /  AlkPhos  x   10-06       PTT - ( 05 Oct 2020 07:50 )  PTT:32.8 sec  CARDIAC MARKERS ( 04 Oct 2020 19:59 )  <0.015 ng/mL / x     / x     / x     / x              RADIOLOGY/EKG:  < from: Xray Chest 1 View- PORTABLE-Urgent (Xray Chest 1 View- PORTABLE-Urgent .) (10.04.20 @ 20:15) >  IMPRESSION: Interstitial opacities bilaterally are without significant interval change most consistent with interstitial lung disease/interstitial pulmonary fibrosis.    < end of copied text >  < from: CT Abdomen and Pelvis No Cont (10.04.20 @ 20:42) >  IMPRESSION:    No explanation for abdominal pain on this CT.  No significant fecal load.  Diverticulosis.  Interval progression of interstitial lung disease with pulmonary fibrosis. Trace bilateral pleural effusions, right greater than left.  Cardiomegaly.    < end of copied text >

## 2020-10-06 NOTE — PROGRESS NOTE ADULT - ASSESSMENT
Acute on chronic decompensated HFpEF- HYpervolemic, NYHA class III-  He did not followup secondary to COVID pandemic in the office with me.  Continue diuresis with iv lasix.  He is on high dose lasix which needs close monitoring of his renal function.  He has baseline CKD as well.  Diuresis with close monitoring of the renal function and electrolytes.  Goal potassium of 4 and magnesium of 2.   Strict I/O and daily wt checks. Low sodium diet. Nutrition education.     HTN-continue home meds.  Close monitoring of the BP.    CAD- no evidence of ACS.  |Will continue current meds.    Hyperlipidemia- continue atorvastatin.    s/p PPM- normal function per EKG.    Other medical issues- Management per primary team.   Thank you for allowing me to participate in the care of this patient. Please feel free to contact me with any questions.

## 2020-10-06 NOTE — PROGRESS NOTE ADULT - ASSESSMENT
89 yo M w PMH of dementia, CHF w EF of 65% (May 2019), HTN, CAD s/p PCI, s/p pacemaker, Afib on Warfarin, BPH, depression & gastritis presented to the ED w daughter for SOB on exertion. BNP elevated at 4045, CT shows B/L pleural effusions, R>L, and 2+ pitting edema B/L. Pt also has increased Cr 3.10, daughter states this is new baseline.     #Acute CHF exacerbation  Pt w PMH of CHF, EF 65% from May 2019, bilateral pitting edema, bilateral pleural effusions on CT.   -Lasix 60mg BID plan to taper to PO  -Continue Toprol XL 50mg QD  -Monitor vitals/oxygen saturation on room air  -Daily weights, water restriction up to 2L/day  -Cardio recommendations appreciated  -Nephro rec appreciated  -Echo:  Estimated left ventricular ejection fraction is 55-60 %. Mild concentric left ventricular hypertrophy is present.    #Acute on Chronic Renal Failure  -Cr elevated from previous admission, from 1.5 to 3  -3.22 today  -Daughter states this is new baseline, seeing Dr. Alfaro for past 5 months  -Trend Cr    #Afib (CHADVASC score of 7)  -on metoprolol and coumaidin  -INR 1.68, subtherapeutic.   -F/U PT/INR, adjust warfarin daily    #Hyponatremia  -resolved  -Na 132-->138  -monitor    #Constipation  -Bowel regimen  -I&O's    #HTN  -Continue Toprol XL 50 mg QD    #BPH  -Continue home finasteride 5mg QD  -Continue home tamsulosin 0.4mg QD  -Monitor I&O's    #Depression  -Sertraline 25mg QD    #Hx of CVA  -Atorvastatin 40mg QD    #CAD s/p PCI    -Atorvastatin 40mg QD  -Not an ASA due to hx of gastritis  -On Warfarin    #Gastritis  -Denies abd pain  -Protonix 40mg    #Decreased mobility  -Pt consult    #Diet  -DASH diet    #DVT PPX  -On Warfarin    #Advanced Directives  -Full code, daughter is HCP. Spoke to daughter regarding code status and consideration of GOC meeting. Pt still would like full code and no GOC meeting at this time. States that she will speak to her two brothers and let know if change mind.    Dispo: discharge planning     Case discussed with Dr. Cornell

## 2020-10-07 NOTE — PROGRESS NOTE ADULT - ASSESSMENT
89dementia, CHF w EF of 65% (May 2019), HTN, CAD s/p PCI, s/p pacemaker, Afib on Warfarin, BPH, depression CKD IV baseline near 3, recent JACQUELIN secondary to overdiuresis  presented to the ED w daughter for SOB on exertion.     CKD IV  -Renal function subtle rise with diuresis  -DIuretics would look to taper down  -Trend labs/lytes  -NSAID avoidance    CM  -[facility] following, lasix ongoing  -follow weights, labs, I's and O's as able    dc with staff

## 2020-10-07 NOTE — PROGRESS NOTE ADULT - ASSESSMENT
Acute on chronic decompensated HFpEF- HYpervolemic, NYHA class III-  He did not followup secondary to COVID pandemic in the office with me.  Continue diuresis with iv lasix. weight seems to be incraesing instead of decreasing , I wonder if he's responding givne his renal insuff , consider renal consult   He is on high dose lasix which needs close monitoring of his renal function.  we may need to add aldactone or change lasix to Bumex      HTN-continue home meds.  Close monitoring of the BP.    CAD- no evidence of ACS.  |Will continue current meds.    Hyperlipidemia- continue atorvastatin.    s/p PPM- normal function per EKG.    Other medical issues- Management per primary team.   Thank you for allowing me to participate in the care of this patient. Please feel free to contact me with any questions.

## 2020-10-07 NOTE — PROGRESS NOTE ADULT - SUBJECTIVE AND OBJECTIVE BOX
Patient is a 89y Male who reports no complaints as new. Reports chronic  LH.    REVIEW OF SYSTEMS:    CONSTITUTIONAL: stable weakness, fevers or chills  RESPIRATORY: No cough, wheezing, hemoptysis; No shortness of breath  CARDIOVASCULAR: No chest pain or palpitations  GENITOURINARY: No dysuria, frequency or hematuria  All other review of systems is negative unless indicated above.    MEDICATIONS  (STANDING):  atorvastatin Oral Tab/Cap - Peds 40 milliGRAM(s) Oral daily  finasteride 5 milliGRAM(s) Oral daily  furosemide    Tablet 80 milliGRAM(s) Oral daily  metoprolol succinate ER 50 milliGRAM(s) Oral daily  pantoprazole    Tablet 40 milliGRAM(s) Oral before breakfast  polyethylene glycol 3350 17 Gram(s) Oral daily  sertraline 25 milliGRAM(s) Oral daily  tamsulosin 0.4 milliGRAM(s) Oral at bedtime  timolol 0.5% Solution 1 Drop(s) Both EYES daily  warfarin 3 milliGRAM(s) Oral daily    MEDICATIONS  (PRN):        T(C): , Max: 36.4 (10-07-20 @ 08:20)  T(F): , Max: 97.5 (10-07-20 @ 08:20)  HR: 60 (10-07-20 @ 16:02)  BP: 100/42 (10-07-20 @ 16:02)  BP(mean): --  RR: 17 (10-07-20 @ 16:02)  SpO2: 95% (10-07-20 @ 16:02)  Wt(kg): --    10-06 @ 07:01  -  10-07 @ 07:00  --------------------------------------------------------  IN: 30 mL / OUT: 0 mL / NET: 30 mL          PHYSICAL EXAM:    Constitutional: NAD, frail  HEENT: temp wasting,cachectic  Neck: No LAD, No JVD  Respiratory: dist BS  Cardiovascular: S1 and S2,  Extremities:  peripheral edema  Neurological: A/lert, pleasent  : No Mercedes  Skin: No rashes  Access: Not applicable        LABS:                        9.0    5.79  )-----------( 180      ( 07 Oct 2020 08:12 )             28.2     07 Oct 2020 16:57    138    |  103    |  78     ----------------------------<  111    4.6     |  31     |  3.35   07 Oct 2020 08:12    138    |  103    |  79     ----------------------------<  92     4.2     |  28     |  3.36   06 Oct 2020 16:42    137    |  103    |  75     ----------------------------<  131    4.7     |  28     |  3.49   06 Oct 2020 07:42    138    |  104    |  73     ----------------------------<  86     4.1     |  27     |  3.22   05 Oct 2020 16:21    135    |  102    |  67     ----------------------------<  102    4.9     |  24     |  3.28     Ca    8.9        07 Oct 2020 16:57  Ca    8.9        07 Oct 2020 08:12  Ca    9.0        06 Oct 2020 16:42  Ca    9.0        06 Oct 2020 07:42  Ca    9.2        05 Oct 2020 16:21  Phos  4.1       07 Oct 2020 16:57  Phos  4.0       07 Oct 2020 08:12  Phos  4.2       06 Oct 2020 16:42  Phos  4.1       06 Oct 2020 07:42  Phos  4.3       05 Oct 2020 16:21    TPro  x      /  Alb  3.3    /  TBili  x      /  DBili  x      /  AST  x      /  ALT  x      /  AlkPhos  x      07 Oct 2020 16:57  TPro  x      /  Alb  3.1    /  TBili  x      /  DBili  x      /  AST  x      /  ALT  x      /  AlkPhos  x      07 Oct 2020 08:12  TPro  x      /  Alb  3.1    /  TBili  x      /  DBili  x      /  AST  x      /  ALT  x      /  AlkPhos  x      06 Oct 2020 16:42  TPro  x      /  Alb  3.0    /  TBili  x      /  DBili  x      /  AST  x      /  ALT  x      /  AlkPhos  x      06 Oct 2020 07:42  TPro  x      /  Alb  3.4    /  TBili  x      /  DBili  x      /  AST  x      /  ALT  x      /  AlkPhos  x      05 Oct 2020 16:21          Urine Studies:          RADIOLOGY & ADDITIONAL STUDIES:

## 2020-10-07 NOTE — PROGRESS NOTE ADULT - ATTENDING COMMENTS
on exam- comfortable   -rs-aeb, cta     #change iv to po lasix and monitor for today and discharge tomorrow if stable

## 2020-10-07 NOTE — PROGRESS NOTE ADULT - SUBJECTIVE AND OBJECTIVE BOX
89 yo M w PMH of dementia, CHF w EF of 65% (May 2019), HTN, CAD s/p PCI, s/p pacemaker, Afib on Warfarin, BPH, depression & gastritis presented to the ED w daughter for SOB on exertion. Pt is pleasantly confused, responds appropriately but poor historian. History obtained from  previous charts and daughter Maria Antonia Berger (HCP). Daughter states that pt has had decreased mobility over the past 5 months, being relatively independent to decreasing mobility to increased SOB on exertion, ambulates on a roller. States that pt has also had decreased PO intake, decreased urination, 10 lb weight loss over the past week, since his Furosemide was increased to 80mg QD. Daughter states that pt started seeing Dr. Alfaro (Nephrologist) about 3 months ago due to "renal failure", states new baseline of Cr is 3. At bedside, pt denies chest pain, SOB, abd pain, HA, palpitations, feeling light headed or malaise.   Last admission in Sept 2019 for HF exacerbation.   In ED, pts vitals were stable, oxygenating well on RA. Pt was given 40mg Lasix.     Subjective: Pt seen at bedside. Pt doing well, no complaints. NAD. Denies SOB, chest pain.    REVIEW OF SYSTEMS:  CONSTITUTIONAL: No weakness, fevers or chills  EYES/ENT: No visual changes;  No vertigo or throat pain   NECK: No pain or stiffness  RESPIRATORY: No cough, wheezing, hemoptysis; No shortness of breath  CARDIOVASCULAR: No chest pain or palpitations  GASTROINTESTINAL: No abdominal or epigastric pain. No nausea, vomiting, or hematemesis; No diarrhea or constipation. No melena or hematochezia.  GENITOURINARY: No dysuria, frequency or hematuria  NEUROLOGICAL: No numbness or weakness  SKIN: No itching, burning, rashes, or lesions   All other review of systems is negative unless indicated above    Vital Signs Last 24 Hrs  Vital Signs Last 24 Hrs  T(C): 36.4 (07 Oct 2020 08:20), Max: 36.4 (07 Oct 2020 08:20)  T(F): 97.5 (07 Oct 2020 08:20), Max: 97.5 (07 Oct 2020 08:20)  HR: 60 (07 Oct 2020 08:20) (60 - 60)  BP: 85/45 (07 Oct 2020 09:52) (85/45 - 111/50)  BP(mean): --  RR: 18 (07 Oct 2020 08:20) (16 - 18)  SpO2: 95% (07 Oct 2020 08:20) (94% - 98%)    PHYSICAL EXAM:  Constitutional: Pt lying in bed, awake and alert, NAD  HEENT: EOMI, normal hearing, moist mucous membranes  Neck: Soft and supple, no JVD  Respiratory: CTABL, No wheezing, rales or rhonchi  Cardiovascular: S1S2+, RRR, no M/G/R  Gastrointestinal: BS+, soft, NT/ND, no guarding, no rebound  Extremities: no peripheral edema   Vascular: 2+ peripheral pulses  Neurological: AAOx1 (name), no focal deficits  Musculoskeletal: 5/5 strength b/l upper and lower extremities  Skin: No rashes    MEDICATIONS:  MEDICATIONS  (STANDING):  atorvastatin Oral Tab/Cap - Peds 40 milliGRAM(s) Oral daily  cyproheptadine 4 milliGRAM(s) Oral every 12 hours  finasteride 5 milliGRAM(s) Oral daily  furosemide   Injectable 60 milliGRAM(s) IV Push three times a day  metoprolol succinate ER 50 milliGRAM(s) Oral daily  pantoprazole    Tablet 40 milliGRAM(s) Oral before breakfast  polyethylene glycol 3350 17 Gram(s) Oral daily  sertraline 25 milliGRAM(s) Oral daily  tamsulosin 0.4 milliGRAM(s) Oral at bedtime  timolol 0.5% Solution 1 Drop(s) Both EYES daily  warfarin 4 milliGRAM(s) Oral daily      LABS: All Labs Reviewed:                        9.0    5.79  )-----------( 180      ( 07 Oct 2020 08:12 )             28.2   10-07    138  |  103  |  79<H>  ----------------------------<  92  4.2   |  28  |  3.36<H>    Ca    8.9      07 Oct 2020 08:12  Phos  4.0     10-07    TPro  x   /  Alb  3.1<L>  /  TBili  x   /  DBili  x   /  AST  x   /  ALT  x   /  AlkPhos  x   10-07       PTT - ( 05 Oct 2020 07:50 )  PTT:32.8 sec  CARDIAC MARKERS ( 04 Oct 2020 19:59 )  <0.015 ng/mL / x     / x     / x     / x              RADIOLOGY/EKG:  < from: Xray Chest 1 View- PORTABLE-Urgent (Xray Chest 1 View- PORTABLE-Urgent .) (10.04.20 @ 20:15) >  IMPRESSION: Interstitial opacities bilaterally are without significant interval change most consistent with interstitial lung disease/interstitial pulmonary fibrosis.    < end of copied text >  < from: CT Abdomen and Pelvis No Cont (10.04.20 @ 20:42) >  IMPRESSION:    No explanation for abdominal pain on this CT.  No significant fecal load.  Diverticulosis.  Interval progression of interstitial lung disease with pulmonary fibrosis. Trace bilateral pleural effusions, right greater than left.  Cardiomegaly.    < end of copied text >   89 yo M w PMH of dementia, CHF w EF of 65% (May 2019), HTN, CAD s/p PCI, s/p pacemaker, Afib on Warfarin, BPH, depression & gastritis presented to the ED w daughter for SOB on exertion. Pt is pleasantly confused, responds appropriately but poor historian. History obtained from  previous charts and daughter Maria Antonia Berger (HCP). Daughter states that pt has had decreased mobility over the past 5 months, being relatively independent to decreasing mobility to increased SOB on exertion, ambulates on a roller. States that pt has also had decreased PO intake, decreased urination, 10 lb weight loss over the past week, since his Furosemide was increased to 80mg QD. Daughter states that pt started seeing Dr. Alfaro (Nephrologist) about 3 months ago due to "renal failure", states new baseline of Cr is 3. At bedside, pt denies chest pain, SOB, abd pain, HA, palpitations, feeling light headed or malaise.   Last admission in Sept 2019 for HF exacerbation.   In ED, pts vitals were stable, oxygenating well on RA. Pt was given 40mg Lasix.     Subjective: Pt seen at bedside. Pt doing well, no complaints. NAD. Denies SOB, chest pain.    REVIEW OF SYSTEMS:  CONSTITUTIONAL: No weakness, fevers or chills  EYES/ENT: No visual changes;  No vertigo or throat pain   NECK: No pain or stiffness  RESPIRATORY: No cough, wheezing, hemoptysis; No shortness of breath  CARDIOVASCULAR: No chest pain or palpitations  GASTROINTESTINAL: No abdominal or epigastric pain. No nausea, vomiting, or hematemesis; No diarrhea or constipation. No melena or hematochezia.  GENITOURINARY: No dysuria, frequency or hematuria  NEUROLOGICAL: No numbness or weakness  SKIN: No itching, burning, rashes, or lesions   All other review of systems is negative unless indicated above    Vital Signs Last 24 Hrs  Vital Signs Last 24 Hrs  T(C): 36.4 (07 Oct 2020 08:20), Max: 36.4 (07 Oct 2020 08:20)  T(F): 97.5 (07 Oct 2020 08:20), Max: 97.5 (07 Oct 2020 08:20)  HR: 60 (07 Oct 2020 08:20) (60 - 60)  BP: 85/45 (07 Oct 2020 09:52) (85/45 - 111/50)  BP(mean): --  RR: 18 (07 Oct 2020 08:20) (16 - 18)  SpO2: 95% (07 Oct 2020 08:20) (94% - 98%)    PHYSICAL EXAM:  Constitutional: Pt lying in bed, awake and alert, NAD  HEENT: EOMI, normal hearing, moist mucous membranes  Neck: Soft and supple, no JVD  Respiratory: CTABL, No wheezing, rales or rhonchi  Cardiovascular: S1S2+, RRR, no M/G/R  Gastrointestinal: BS+, soft, NT/ND, no guarding, no rebound  Extremities: no peripheral edema   Vascular: 2+ peripheral pulses  Neurological: AAOx1 (name), no focal deficits  Musculoskeletal: 4/5 strength b/l upper and lower extremities  Skin: No rashes    MEDICATIONS:  MEDICATIONS  (STANDING):  atorvastatin Oral Tab/Cap - Peds 40 milliGRAM(s) Oral daily  cyproheptadine 4 milliGRAM(s) Oral every 12 hours  finasteride 5 milliGRAM(s) Oral daily  furosemide   Injectable 60 milliGRAM(s) IV Push three times a day  metoprolol succinate ER 50 milliGRAM(s) Oral daily  pantoprazole    Tablet 40 milliGRAM(s) Oral before breakfast  polyethylene glycol 3350 17 Gram(s) Oral daily  sertraline 25 milliGRAM(s) Oral daily  tamsulosin 0.4 milliGRAM(s) Oral at bedtime  timolol 0.5% Solution 1 Drop(s) Both EYES daily  warfarin 4 milliGRAM(s) Oral daily      LABS: All Labs Reviewed:                        9.0    5.79  )-----------( 180      ( 07 Oct 2020 08:12 )             28.2   10-07    138  |  103  |  79<H>  ----------------------------<  92  4.2   |  28  |  3.36<H>    Ca    8.9      07 Oct 2020 08:12  Phos  4.0     10-07    TPro  x   /  Alb  3.1<L>  /  TBili  x   /  DBili  x   /  AST  x   /  ALT  x   /  AlkPhos  x   10-07       PTT - ( 05 Oct 2020 07:50 )  PTT:32.8 sec  CARDIAC MARKERS ( 04 Oct 2020 19:59 )  <0.015 ng/mL / x     / x     / x     / x              RADIOLOGY/EKG:  < from: Xray Chest 1 View- PORTABLE-Urgent (Xray Chest 1 View- PORTABLE-Urgent .) (10.04.20 @ 20:15) >  IMPRESSION: Interstitial opacities bilaterally are without significant interval change most consistent with interstitial lung disease/interstitial pulmonary fibrosis.    < end of copied text >  < from: CT Abdomen and Pelvis No Cont (10.04.20 @ 20:42) >  IMPRESSION:    No explanation for abdominal pain on this CT.  No significant fecal load.  Diverticulosis.  Interval progression of interstitial lung disease with pulmonary fibrosis. Trace bilateral pleural effusions, right greater than left.  Cardiomegaly.    < end of copied text >

## 2020-10-07 NOTE — DISCHARGE NOTE NURSING/CASE MANAGEMENT/SOCIAL WORK - PATIENT PORTAL LINK FT
You can access the FollowMyHealth Patient Portal offered by [facility] by registering at the following website: [email or web address]. By joining [facility]’s FollowMyHealth portal, you will also be able to view your health information using other applications (apps) compatible with our system.

## 2020-10-07 NOTE — PROGRESS NOTE ADULT - SUBJECTIVE AND OBJECTIVE BOX
Patient is a 89y old  Male who presents with a chief complaint of Acute respiratory failure (06 Oct 2020 19:50)    10/7- still with SOB , PANDYA weight seems to be increasing instead of decreasing with IV lasix    MEDICATIONS  (STANDING):  atorvastatin Oral Tab/Cap - Peds 40 milliGRAM(s) Oral daily  finasteride 5 milliGRAM(s) Oral daily  furosemide   Injectable 60 milliGRAM(s) IV Push two times a day  metoprolol succinate ER 50 milliGRAM(s) Oral daily  pantoprazole    Tablet 40 milliGRAM(s) Oral before breakfast  polyethylene glycol 3350 17 Gram(s) Oral daily  sertraline 25 milliGRAM(s) Oral daily  tamsulosin 0.4 milliGRAM(s) Oral at bedtime  timolol 0.5% Solution 1 Drop(s) Both EYES daily  warfarin 4 milliGRAM(s) Oral daily    MEDICATIONS  (PRN):            Vital Signs Last 24 Hrs  T(C): 36.4 (07 Oct 2020 08:20), Max: 36.4 (07 Oct 2020 08:20)  T(F): 97.5 (07 Oct 2020 08:20), Max: 97.5 (07 Oct 2020 08:20)  HR: 60 (07 Oct 2020 08:20) (60 - 60)  BP: 91/55 (07 Oct 2020 08:20) (91/55 - 111/50)  BP(mean): --  RR: 18 (07 Oct 2020 08:20) (16 - 18)  SpO2: 95% (07 Oct 2020 08:20) (93% - 98%)            INTERPRETATION OF TELEMETRY:    ECG:        LABS:                        8.9    4.97  )-----------( 176      ( 06 Oct 2020 07:42 )             28.3     10-06    137  |  103  |  75<H>  ----------------------------<  131<H>  4.7   |  28  |  3.49<H>    Ca    9.0      06 Oct 2020 16:42  Phos  4.2     10-06    TPro  x   /  Alb  3.1<L>  /  TBili  x   /  DBili  x   /  AST  x   /  ALT  x   /  AlkPhos  x   10-06        PT/INR - ( 06 Oct 2020 07:42 )   PT: 19.2 sec;   INR: 1.68 ratio         PTT - ( 06 Oct 2020 07:42 )  PTT:34.7 sec    I&O's Summary    06 Oct 2020 07:01  -  07 Oct 2020 07:00  --------------------------------------------------------  IN: 30 mL / OUT: 0 mL / NET: 30 mL      BNP  RADIOLOGY & ADDITIONAL STUDIES:

## 2020-10-07 NOTE — DISCHARGE NOTE NURSING/CASE MANAGEMENT/SOCIAL WORK - NSDCFUADDAPPT_GEN_ALL_CORE_FT
[facility] 10/8/20 7 days 8 hrs [facility] 10/8/20 7 days 8 hrs    Dr. Jose Francisco Andrea  [address] [id number] [phone]  October 10,2020 at 1:20 [facility] 10/9/20 7 days 8 hrs CM Teresa Moreno (T) [phone] F) [phone]    Dr. Jose Francisco Andrea  [address] [place] [address] [phone]  October 10,2020 at 1:20

## 2020-10-07 NOTE — PROGRESS NOTE ADULT - ASSESSMENT
89 yo M w PMH of dementia, CHF w EF of 65% (May 2019), HTN, CAD s/p PCI, s/p pacemaker, Afib on Warfarin, BPH, depression & gastritis presented to the ED w daughter for SOB on exertion. BNP elevated at 4045, CT shows B/L pleural effusions, R>L, and 2+ pitting edema B/L. Pt also has increased Cr 3.10, daughter states this is new baseline.     #Acute CHF exacerbation  Pt w PMH of CHF, EF 65% from May 2019, bilateral pitting edema, bilateral pleural effusions on CT.   -will start lasix PO 80 mg today  -Continue Toprol XL 50mg QD  -Monitor vitals/oxygen saturation on room air  -Daily weights, water restriction up to 2L/day  -Cardio recommendations appreciated  -Nephro rec appreciated  -Echo: Estimated left ventricular ejection fraction is 55-60 %. Mild concentric left ventricular hypertrophy is present.    #Acute on Chronic Renal Failure  -Cr elevated from previous admission, from 1.5 to 3  -3.22 today  -Daughter states this is new baseline, seeing Dr. Alfaro for past 5 months  -Trend Cr    #Afib (CHADVASC score of 7)  -on metoprolol and coumaidin  -INR 1.68, subtherapeutic.   -F/U PT/INR, adjust warfarin daily    #Hyponatremia  -resolved  -Na 132-->138  -monitor    #Constipation  -Bowel regimen  -I&O's    #HTN  -Continue Toprol XL 50 mg QD    #BPH  -Continue home finasteride 5mg QD  -Continue home tamsulosin 0.4mg QD  -Monitor I&O's    #Depression  -Sertraline 25mg QD    #Hx of CVA  -Atorvastatin 40mg QD    #CAD s/p PCI    -Atorvastatin 40mg QD  -Not an ASA due to hx of gastritis  -On Warfarin    #Gastritis  -Denies abd pain  -Protonix 40mg    #Decreased mobility  -Pt consult    #Diet  -DASH diet    #DVT PPX  -On Warfarin    #Advanced Directives  -Full code, daughter is HCP. Spoke to daughter regarding code status and consideration of GOC meeting. Pt still would like full code and no GOC meeting at this time. States that she will speak to her two brothers and let know if change mind.    Dispo: plan to d/c tomorr    Case discussed with Dr. Cornell 89 yo M w PMH of dementia, CHF w EF of 65% (May 2019), HTN, CAD s/p PCI, s/p pacemaker, Afib on Warfarin, BPH, depression & gastritis presented to the ED w daughter for SOB on exertion. BNP elevated at 4045, CT shows B/L pleural effusions, R>L, and 2+ pitting edema B/L. Pt also has increased Cr 3.10, daughter states this is new baseline.     #Acute CHF exacerbation  Pt w PMH of CHF, EF 65% from May 2019, bilateral pitting edema, bilateral pleural effusions on CT.   -will start lasix PO 80 mg today  -Continue Toprol XL 50mg QD  -Monitor vitals/oxygen saturation on room air  -Daily weights, water restriction up to 2L/day  -Cardio recommendations appreciated  -Nephro rec appreciated  -Echo: Estimated left ventricular ejection fraction is 55-60 %. Mild concentric left ventricular hypertrophy is present.    #Acute on Chronic Renal Failure  -Cr elevated from previous admission, from 1.5 to 3  -3.22 today  -Daughter states this is new baseline, seeing Dr. Alfaro for past 5 months  -Trend Cr    #Afib (CHADVASC score of 7)  -on metoprolol and coumaidin  -INR 2.31, subtherapeutic.   -F/U PT/INR, adjust warfarin daily    #Hyponatremia  -resolved  -Na 132-->138  -monitor    #Constipation  -Bowel regimen  -I&O's    #HTN  -Continue Toprol XL 50 mg QD    #BPH  -Continue home finasteride 5mg QD  -Continue home tamsulosin 0.4mg QD  -Monitor I&O's    #Depression  -Sertraline 25mg QD    #Hx of CVA  -Atorvastatin 40mg QD    #CAD s/p PCI    -Atorvastatin 40mg QD  -Not an ASA due to hx of gastritis  -On Warfarin    #Gastritis  -Denies abd pain  -Protonix 40mg    #Decreased mobility  -Pt consult    #Diet  -DASH diet    #DVT PPX  -On Warfarin    #Advanced Directives  -Full code, daughter is HCP. Spoke to daughter regarding code status and consideration of GOC meeting. Pt still would like full code and no GOC meeting at this time. States that she will speak to her two brothers and let know if change mind.    Dispo: plan to d/c tomorrow     Case discussed with Dr. Cornell 89 yo M w PMH of dementia, CHF w EF of 65% (May 2019), HTN, CAD s/p PCI, s/p pacemaker, Afib on Warfarin, BPH, depression & gastritis presented to the ED w daughter for SOB on exertion. BNP elevated at 4045, CT shows B/L pleural effusions, R>L, and 2+ pitting edema B/L. Pt also has increased Cr 3.10, daughter states this is new baseline.     #Acute CHF exacerbation  Pt w PMH of CHF, EF 65% from May 2019, bilateral pitting edema, bilateral pleural effusions on CT.   -will start lasix PO 80 mg today  -Continue Toprol XL 50mg QD  -Monitor vitals/oxygen saturation on room air  -Daily weights, water restriction up to 1.5L/day  -Cardio recommendations appreciated  -Nephro rec appreciated  -Echo: Estimated left ventricular ejection fraction is 55-60 %. Mild concentric left ventricular hypertrophy is present.    #Acute on Chronic Renal Failure  -Cr elevated from previous admission, from 1.5 to 3  -3.22 today  -Daughter states this is new baseline, seeing Dr. Alfaro for past 5 months  -Trend Cr    #Afib (CHADVASC score of 7)  -on metoprolol and coumaidin  -INR 2.31, therapeutic.   - Will order home dose of Warfarin 3mg tonight  -F/U PT/INR, adjust warfarin daily    #Hyponatremia  -resolved  -Na 132-->138  -monitor    #Constipation  -Bowel regimen  -I&O's    #HTN  -Continue Toprol XL 50 mg QD    #BPH  -Continue home finasteride 5mg QD  -Continue home tamsulosin 0.4mg QD  -Monitor I&O's    #Depression  -Sertraline 25mg QD    #Hx of CVA  -Atorvastatin 40mg QD    #CAD s/p PCI    -Atorvastatin 40mg QD  -Not an ASA due to hx of gastritis  -On Warfarin    #Gastritis  -Denies abd pain  -Protonix 40mg    #Decreased mobility  -Pt consult    #Diet  -DASH diet    #DVT PPX  -On Warfarin    #Advanced Directives  -Full code, daughter is HCP. Spoke to daughter regarding code status and consideration of GOC meeting. Pt still would like full code and no GOC meeting at this time. States that she will speak to her two brothers and let know if change mind.    Dispo: plan to d/c tomorrow     Case discussed with Dr. Cornell

## 2020-10-08 NOTE — PROGRESS NOTE ADULT - ASSESSMENT
Acute on chronic decompensated HFpEF- Hypervolemic, NYHA class III-  Continue po lasix.  DC planning. f/u with me in one week in office.    Diuresis with close monitoring of the renal function and electrolytes.  Goal potassium of 4 and magnesium of 2.   Strict I/O and daily wt checks. Low sodium diet. Nutrition education.     HTN-continue home meds.  Close monitoring of the BP.    CAD- no evidence of ACS.  Will continue current meds.    Hyperlipidemia- continue atorvastatin.    s/p PPM- normal function per EKG.    Other medical issues- Management per primary team.   Thank you for allowing me to participate in the care of this patient. Please feel free to contact me with any questions.

## 2020-10-08 NOTE — PROGRESS NOTE ADULT - ASSESSMENT
Pt has been seen and examined with FP resident, resident supervised agree with a/p       Patient is a 89y old  Male who presents with a chief complaint of Acute respiratory failure (08 Oct 2020 09:51)          PHYSICAL EXAM:  Vital Signs Last 24 Hrs  T(C): 36.6 (08 Oct 2020 08:26), Max: 36.7 (07 Oct 2020 21:30)  T(F): 97.9 (08 Oct 2020 08:26), Max: 98 (07 Oct 2020 21:30)  HR: 60 (08 Oct 2020 08:26) (60 - 60)  BP: 111/44 (08 Oct 2020 08:26) (100/42 - 114/74)  BP(mean): --  RR: 17 (08 Oct 2020 08:26) (16 - 17)  SpO2: 93% (08 Oct 2020 08:26) (93% - 98%)  -rs-aeeb, cta  -cvs-s1s2 normal   -p/a-soft,bs+      A/P    #d/c with further management as an out pt, time spent 45 minutes

## 2020-10-08 NOTE — PROGRESS NOTE ADULT - SUBJECTIVE AND OBJECTIVE BOX
Patient is a 89y Male who reports no complaints as new  per staff - going home today     REVIEW OF SYSTEMS:    CONSTITUTIONAL: stable weakness, fevers or chills  RESPIRATORY: No cough, wheezing, hemoptysis; No shortness of breath  CARDIOVASCULAR: No chest pain or palpitations  GENITOURINARY: No dysuria, frequency or hematuria  All other review of systems is negative unless indicated above.    MEDICATIONS  (STANDING):  atorvastatin Oral Tab/Cap - Peds 40 milliGRAM(s) Oral daily  finasteride 5 milliGRAM(s) Oral daily  furosemide    Tablet 80 milliGRAM(s) Oral daily  metoprolol succinate ER 50 milliGRAM(s) Oral daily  pantoprazole    Tablet 40 milliGRAM(s) Oral before breakfast  polyethylene glycol 3350 17 Gram(s) Oral daily  sertraline 25 milliGRAM(s) Oral daily  tamsulosin 0.4 milliGRAM(s) Oral at bedtime  timolol 0.5% Solution 1 Drop(s) Both EYES daily  warfarin 3 milliGRAM(s) Oral daily    MEDICATIONS  (PRN):        Vital Signs Last 24 Hrs  T(C): 36.7 (08 Oct 2020 15:25), Max: 36.7 (07 Oct 2020 21:30)  T(F): 98.1 (08 Oct 2020 15:25), Max: 98.1 (08 Oct 2020 15:25)  HR: 60 (08 Oct 2020 15:25) (60 - 60)  BP: 96/59 (08 Oct 2020 15:25) (96/59 - 114/74)  BP(mean): --  RR: 18 (08 Oct 2020 15:25) (16 - 18)  SpO2: 98% (08 Oct 2020 15:25) (93% - 98%)    I&O's Detail    07 Oct 2020 07:01  -  08 Oct 2020 07:00  --------------------------------------------------------  IN:    Oral Fluid: 60 mL  Total IN: 60 mL    OUT:  Total OUT: 0 mL    Total NET: 60 mL              PHYSICAL EXAM:    Constitutional: NAD, frail  HEENT: temp wasting,cachectic  Neck: No LAD, No JVD  Respiratory: dist BS  Cardiovascular: S1 and S2,  Extremities:  peripheral edema + ankles   Neurological: A/yimi, pleasent  : Abbey Mercedes  Skin: No rashes  Access: Not applicable        LABS:      140    |  104    |  80     ----------------------------<  91        08 Oct 2020 09:01  4.2     |  29     |  3.47     138    |  103    |  78     ----------------------------<  111       07 Oct 2020 16:57  4.6     |  31     |  3.35     138    |  103    |  79     ----------------------------<  92        07 Oct 2020 08:12  4.2     |  28     |  3.36     Ca    9.1        08 Oct 2020 09:01  Ca    8.9        07 Oct 2020 16:57    Phos  4.1       07 Oct 2020 16:57  Phos  4.0       07 Oct 2020 08:12      TPro  x      /  Alb  3.3    /  TBili  x      /        07 Oct 2020 16:57  DBili  x      /  AST  x      /  ALT  x      /  AlkPhos  x        TPro  x      /  Alb  3.1    /  TBili  x      /        07 Oct 2020 08:12  DBili  x      /  AST  x      /  ALT  x      /  AlkPhos  x                                9.0    5.79  )-----------( 180      ( 07 Oct 2020 08:12 )             28.2         Urine Studies:          RADIOLOGY & ADDITIONAL STUDIES:

## 2020-10-08 NOTE — PROVIDER CONTACT NOTE (OTHER) - SITUATION
notified Dr Andrea's office of admission
Tele MD office spoke with Annamaria to advise MD of routine consult.
Tele MD office spoke with Sofía to confirm patient has appointment Sat 10/10/20 at 1pm
notified Dr Alfaro of consult

## 2020-10-08 NOTE — PROGRESS NOTE ADULT - ASSESSMENT
89dementia, CHF w EF of 65% (May 2019), HTN, CAD s/p PCI, s/p pacemaker, Afib on Warfarin, BPH, depression CKD IV baseline near 3, recent JACQUELIN secondary to overdiuresis  presented to the ED w daughter for SOB on exertion.     CKD IV  - BUn/Cr subtle rise with diuresis - DIuretics now po   - monitor Cr/BUN trend outpatient with Dr Alfaro   - NSAID avoidance    CM  - po lasix today     d/w RN

## 2020-10-08 NOTE — PROGRESS NOTE ADULT - REASON FOR ADMISSION
Acute respiratory failure

## 2020-10-08 NOTE — PROGRESS NOTE ADULT - SUBJECTIVE AND OBJECTIVE BOX
Patient is a 89y old  Male who presents with a chief complaint of Acute respiratory failure.       HPI:  89 yo M w PMH of dementia, CHF w EF of 65% (May 2019), HTN, CAD s/p PCI, s/p pacemaker, Afib on Warfarin, BPH, depression & gastritis presented to the ED w daughter for SOB on exertion.    He was diagnosed with CHF and was being treated with iv diuretics.    10/8 - Pt seen and examined this am.   This am he denies any CP or SOB.      PAST MEDICAL & SURGICAL HISTORY:  Congestive heart failure    Dementia    Depression    History of gastritis    CVA (cerebrovascular accident)  Over 10 years ago    BPH (benign prostatic hyperplasia)    HTN (hypertension)    Coronary artery disease  s/p PCI    Pacemaker    S/P coronary artery stent placement    History of appendectomy  Over 50 years ago        MEDICATIONS  (STANDING):  atorvastatin Oral Tab/Cap - Peds 40 milliGRAM(s) Oral daily  finasteride 5 milliGRAM(s) Oral daily  furosemide   Injectable 60 milliGRAM(s) IV Push three times a day  metoprolol succinate ER 50 milliGRAM(s) Oral daily  pantoprazole    Tablet 40 milliGRAM(s) Oral before breakfast  polyethylene glycol 3350 17 Gram(s) Oral daily  sertraline 25 milliGRAM(s) Oral daily  tamsulosin 0.4 milliGRAM(s) Oral at bedtime  timolol 0.5% Solution 1 Drop(s) Both EYES daily  warfarin 4 milliGRAM(s) Oral daily    MEDICATIONS  (PRN):      FAMILY HISTORY:  Patient unable to provide medical history  patient does not known parents history        SOCIAL HISTORY: no recent smoking     REVIEW OF SYSTEMS:  CONSTITUTIONAL:    No fatigue, malaise, lethargy.  No fever or chills.  RESPIRATORY:  No cough.  No wheeze.  No hemoptysis.  No shortness of breath.  CARDIOVASCULAR:  No chest pains.  No palpitations. No shortness of breath, No orthopnea or PND.  GASTROINTESTINAL:  No abdominal pain.  No nausea or vomiting.    GENITOURINARY:    No hematuria.    MUSCULOSKELETAL:  No musculoskeletal pain.  No joint swelling.  No arthritis.  NEUROLOGICAL:  No tingling or numbness or weakness.  PSYCHIATRIC:  No confusion  SKIN:  No rashes.             ICU Vital Signs Last 24 Hrs  T(C): 36.6 (08 Oct 2020 08:26), Max: 36.7 (07 Oct 2020 21:30)  T(F): 97.9 (08 Oct 2020 08:26), Max: 98 (07 Oct 2020 21:30)  HR: 60 (08 Oct 2020 08:26) (60 - 60)  BP: 111/44 (08 Oct 2020 08:26) (100/42 - 127/88)  BP(mean): --  ABP: --  ABP(mean): --  RR: 17 (08 Oct 2020 08:26) (16 - 17)  SpO2: 93% (08 Oct 2020 08:26) (93% - 98%)    PHYSICAL EXAM-    Constitutional:elderly frail ill looking male in no acute distress    Head: Head is normocephalic and atraumatic.      Neck:  No JVD.     Cardiovascular: Regular rate and rhythm without S3, S4. No murmurs or rubs are appreciated.      Respiratory: Breathsounds are normal. No rales. No wheezing.    Abdomen: Soft, nontender, nondistended with positive bowel sounds.      Extremity: No tenderness. 2+ b/l pedal  pitting edema - decreasing     Neurologic: The patient is alert and oriented.      Skin: No rash, no obvious lesions noted.      Psychiatric: The patient appears to be emotionally stable.      INTERPRETATION OF TELEMETRY: not on     ECG: V paced [facility]    I&O's Detail      LABS:                                   9.0    5.79  )-----------( 180      ( 07 Oct 2020 08:12 )             28.2     10-07    138  |  103  |  78<H>  ----------------------------<  111<H>  4.6   |  31  |  3.35<H>    Ca    8.9      07 Oct 2020 16:57  Phos  4.1     10-07    TPro  x   /  Alb  3.3  /  TBili  x   /  DBili  x   /  AST  x   /  ALT  x   /  AlkPhos  x   10-07        LIVER FUNCTIONS - ( 07 Oct 2020 16:57 )  Alb: 3.3 g/dL / Pro: x     / ALK PHOS: x     / ALT: x     / AST: x     / GGT: x           PT/INR - ( 07 Oct 2020 08:12 )   PT: 25.8 sec;   INR: 2.31 ratio         PTT - ( 07 Oct 2020 08:12 )  PTT:35.1 sec           PTT - ( 06 Oct 2020 07:42 )  PTT:34.7 sec  Urinalysis Basic - ( 04 Oct 2020 19:58 )    Color: Yellow / Appearance: Clear / S.005 / pH: x  Gluc: x / Ketone: Negative  / Bili: Negative / Urobili: Negative mg/dL   Blood: x / Protein: 15 mg/dL / Nitrite: Negative   Leuk Esterase: Negative / RBC: Negative /HPF / WBC 0-2   Sq Epi: x / Non Sq Epi: Occasional / Bacteria: Negative      I&O's Summary    BNP  RADIOLOGY & ADDITIONAL STUDIES:  < from: Xray Chest 1 View- PORTABLE-Urgent (Xray Chest 1 View- PORTABLE-Urgent .) (10.04.20 @ 20:15) >    IMPRESSION: Interstitial opacities bilaterally are without significant interval change most consistent with interstitial lung disease/interstitial pulmonary fibrosis.              KARON CAVAZOS M.D., ATTENDING RADIOLOGIST  This document has been electronically signed. Oct  5 2020  9:07AM    < end of copied text >  < from: TTE Echo Complete w/o Contrast w/ Doppler (10.05.20 @ 21:28) >     Impression     Summary     Estimated left ventricular ejection fraction is 55-60 %.   Mild concentric left ventricular hypertrophy is present.   The left ventricle is normal in size, wall motion and contractility as   seen in limited views.   The left atrium is moderately dilated.   An interatrial septal aneurysm is noted. There is no evidence of ASD.   Fibrocalcific changes noted to the Aortic valve leaflets with preserved   leaflet excursion.   Themitral valve leaflets appear thin and normal.   Mild (1+) mitral regurgitation is present.   EA reversal of the mitral inflow consistent with reduced compliance of the   left ventricle.   The mitral chordae tendineae appears calcified.   Normal appearing tricuspid valve structure.   Moderate (2+) tricuspid valve regurgitation is present.   Moderate pulmonary hypertension.     Signature    < end of copied text >

## 2020-10-15 PROBLEM — I50.9 HEART FAILURE, UNSPECIFIED: Chronic | Status: ACTIVE | Noted: 2020-01-01

## 2020-10-15 PROBLEM — F32.9 MAJOR DEPRESSIVE DISORDER, SINGLE EPISODE, UNSPECIFIED: Chronic | Status: ACTIVE | Noted: 2020-01-01

## 2020-10-15 PROBLEM — F03.90 UNSPECIFIED DEMENTIA WITHOUT BEHAVIORAL DISTURBANCE: Chronic | Status: ACTIVE | Noted: 2020-01-01

## 2020-10-15 PROBLEM — I63.9 CEREBRAL INFARCTION, UNSPECIFIED: Chronic | Status: ACTIVE | Noted: 2019-05-05

## 2020-10-15 PROBLEM — I25.10 ATHEROSCLEROTIC HEART DISEASE OF NATIVE CORONARY ARTERY WITHOUT ANGINA PECTORIS: Chronic | Status: ACTIVE | Noted: 2019-05-05

## 2020-10-15 PROBLEM — Z87.19 PERSONAL HISTORY OF OTHER DISEASES OF THE DIGESTIVE SYSTEM: Chronic | Status: ACTIVE | Noted: 2020-01-01

## 2020-10-16 PROBLEM — F32.9 DEPRESSION: Status: ACTIVE | Noted: 2020-01-01

## 2020-10-16 PROBLEM — I10 BENIGN ESSENTIAL HYPERTENSION: Status: ACTIVE | Noted: 2020-01-01

## 2020-10-16 NOTE — HISTORY OF PRESENT ILLNESS
[Post-hospitalization from ___ Hospital] : Post-hospitalization from [unfilled] Hospital [Admitted on: ___] : The patient was admitted on [unfilled] [Discharge Summary] : discharge summary [Discharged on ___] : discharged on [unfilled] [Discharge Med List] : discharge medication list [Patient Contacted By: ____] : and contacted by [unfilled] [Med Reconciliation] : medication reconciliation has been completed [FreeTextEntry2] : FROM [facility] DOCUMENT:      \par Hospital Course: HPI: 89 y/o M w PMH of dementia, CHF w EF of 65% (May 2019), HTN, CAD s/p PCI, s/p pacemaker, Afib on Warfarin, BPH, depression & gastritis who presented to ED with daughter for SOB on exertion. Daughter states that pt has had decreased mobility over the past 5 months, being relatively independent to decreasing mobility to increased SOB on exertion, ambulates on a roller. States that pt has also had decreased PO intake, decreased urination, 10 lb weight loss over the past week, since his Furosemide was increased to 80mg QD. Daughter states that pt started seeing Dr. Alfaro (Nephrologist) about 3 months ago due to renal failure, states new baseline of Cr is 3. In ED, pts vitals were stable, oxygenating well on RA. Pt was given 40mg Lasix. Chext x-ray and CT abdomen done. Pt evaluated by cardiology, rec continue IV diuresis. Pt was put on lasix 60 mg TID. Pt evaluated by nephrologist Dr. Alfaro who rec taper IV lasix to BID dose and then to PO soon after. Pt switched home dose lasix 80 mg PO. Pt is currently hemodynamically stable for discharge, should followup with his cardiologist and nephrologist as outpatient. \par Subjective: Pt seen at bedside. Pt doing well. NAD. Hemodynamically stable \par Vitals\par T(F): 97.9 (10-08-20 @ 08:26), Max: 98 (10-07-20 @ 21:30)\par HR: 60 (10-08-20 @ 08:26) (60 - 60)\par BP: 111/44 (10-08-20 @ 08:26) (100/42 - 114/74)\par RR: 17 (10-08-20 @ 08:26) (16 - 17)\par SpO2: 93% (10-08-20 @ 08:26) (93% - 98%)\par Physical Exam \par Gen: NAD, comfortable\par HENT: atraumatic head and ears, no gross abnormalities of ears, mucous membranes moist, neck supple without masses/goiter/lymphadenopathy\par CV: RRR, nl s1/s2, no M/R/G\par Pulm: nl respiratory effort, CTAB, no wheezes/crackles/rhonchi\par Back: no scoliosis, lordosis, or kyphosis, no tenderness\par Abd: normoactive bowel sounds in all 4 quadrants, soft, nontender, nondistended, no rebound, no guarding, no masses\par Extremities: no pedal edema, pedal pulses palpable \par Skin: nl warm and dry, no wounds \par Neuro: A&Ox3, answering questions appropriately, PERRL, EOMI, / strength in upper and lower extremities bilaterally\par Pysch: no depression, no SI, no HI\par RADIOLOGY:\par < from: Xray Chest 1 View- PORTABLE-Urgent (Xray Chest 1 View- PORTABLE-Urgent .) (10.04.20 @ 20:15) >\par IMPRESSION: Interstitial opacities bilaterally are without significant interval change most consistent with interstitial lung disease/interstitial pulmonary fibrosis.\par < end of copied text >\par < from: CT Abdomen and Pelvis No Cont (10.04.20 @ 20:42) >\par IMPRESSION:\par No explanation for abdominal pain on this CT.\par No significant fecal load.\par Diverticulosis.\par Interval progression of interstitial lung disease with pulmonary fibrosis. Trace bilateral pleural effusions, right greater than left.\par Cardiomegaly.\par < end of copied text >\par

## 2020-10-16 NOTE — PLAN
[FreeTextEntry1] : A/P:\par 1.CHF: s/p hospitalization for CHF exacerbation, diuresed and now compensated.\par Con't home dose lasix 80mg daily, metoprolol.\par Enforced with patient need for daily weights. Advised to call for an increase of greater than 2 lbs in a day or 5 pounds in a week.\par Adhere to low salt diet and educated patient on foods that should be  avoided such as processed or fast food.\par Limit fluids to 1 liter a day which is 4-5 glasses.\par Continue medications as ordered. \par Cardiologist/nephrologist as scheduled and prn\par \par 2. Afib: HR regular on exam today. PPM insitu. Con't metoprolol for rate control and coumadin for AC.\par \par 3. HTN: BP stable on exam today. Con't metoprolol. Call for any headaches, dizziness.\par \par 4. Depression: Stable current dose of zoloft. No reports of mood swings, tearfulness, disruptions in sleep patterns or appetite. F/U with PCP as scheduled and prn.

## 2020-10-16 NOTE — ASSESSMENT
[FreeTextEntry1] : Pt is being seen after discharge home from [facility]. He was admitted on 10/4/20 for SOB and discharged home on 10/08/2020. Discharge medications were reviewed and reconciled with the current medication list and medications in home.\par \par CC:\par Pt is seen at home s/p recent admission for CHR exacerbation. Pt is ambulatory with RW, difficult for pt leave home at this time due to debility & requiring considerable effort and assistance.\par HPI:\par Pt Is a 89 y/o male enrolled in the [facility] program seen at home s/p a recent admission for CHF admitted from 10/4-10/8. Pt was contacted by TCM and med rec was done within 48 hours of DC. Upon arrival to patient's home he is OOB to recliner chair awake and alert NAD. He is 89 y/o does not appeared stated age. His dtg Maria Antonia is present for the exam as well as his HHA. He denies CP, SOB, pain, abd discomfort or difficulty with bowel or bladder. Has a good appetite. He is weighed daily, wt stable 142-143lbs. Edema stable to ankles, no increased SOB or need for additional pillows to sleep at night. PPM insitu, + afib, he is on coumadin. BP stable on exam today. He is on zoloft for depression, no reports of sleep disturbances, decreased in appetite or change in mood or behavior. \par \par He has telehealth visit next week with dr. Alfaro (renal) \par House calls referral made. \par \par \par \par

## 2020-10-16 NOTE — PHYSICAL EXAM
[No Acute Distress] : no acute distress [Well Developed] : well developed [Well Nourished] : well nourished [Well-Appearing] : well-appearing [Normal Outer Ear/Nose] : the outer ears and nose were normal in appearance [Normal Sclera/Conjunctiva] : normal sclera/conjunctiva [Supple] : supple [Normal Oropharynx] : the oropharynx was normal [Clear to Auscultation] : lungs were clear to auscultation bilaterally [No Respiratory Distress] : no respiratory distress  [Regular Rhythm] : with a regular rhythm [Normal S1, S2] : normal S1 and S2 [No Accessory Muscle Use] : no accessory muscle use [Normal Rate] : normal rate  [Pedal Pulses Present] : the pedal pulses are present [Soft] : abdomen soft [Non-distended] : non-distended [Normal Bowel Sounds] : normal bowel sounds [Non Tender] : non-tender [Grossly Normal Strength/Tone] : grossly normal strength/tone [No Rash] : no rash [Coordination Grossly Intact] : coordination grossly intact [Normal Affect] : the affect was normal [de-identified] : 1+ ankle edema L>R (chronic)

## 2020-11-08 NOTE — CHRONIC CARE ASSESSMENT
[Patient Non-adherent to care plan] : patient non-adherent to care plan [PPS Score: ____] : Palliative Performance Scale (PPS) Score: [unfilled] [FAST Score: ____] : Functional Assessment Scale (FAST) Score: [unfilled] [Class IV] : [employer] Class Output: Class IV

## 2020-11-08 NOTE — REVIEW OF SYSTEMS
[Fatigue] : fatigue [Recent Change In Weight] : ~T recent weight change [Lower Ext Edema] : lower extremity edema [Shortness Of Breath] : shortness of breath [Constipation] : constipation [Joint Pain] : joint pain [Muscle Weakness] : muscle weakness [Unsteady Walk] : ataxia [Negative] : Integumentary

## 2020-11-11 PROBLEM — Z23 ENCOUNTER FOR IMMUNIZATION: Status: ACTIVE | Noted: 2020-01-01

## 2020-11-13 PROBLEM — Z78.9 NON-SMOKER: Status: ACTIVE | Noted: 2020-01-01

## 2020-11-13 NOTE — CURRENT MEDS
[Medication and Allergies Reconciled] : medication and allergies reconciled [High Risk Medications Reviewed and Reconciled (Beers Criteria)] : high risk medications reviewed and reconciled [Reviewed patient reported medication adherence from Comprehensive Assessment] : Reviewed patient reported medication adherence from comprehensive assessment [Non adherent to medications] : Patient is non adherent to medications as prescribed [de-identified] : reluctant to take  diuretics   educated about  risk and benefits

## 2020-11-13 NOTE — PHYSICAL EXAM
[Normal Sclera/Conjunctiva] : normal sclera/conjunctiva [No JVD] : no jugular venous distention [Normal Rate] : heart rate was normal  [Regular Rhythm] : with a regular rhythm [Oriented x3] : oriented to person, place, and time [Normal Affect] : the affect was normal [No Motor Deficits] : the motor exam was normal [No Gross Sensory Deficits] : no gross sensory deficits [de-identified] : elderly  male cooperative   looking younger than stated age  [de-identified] : rhonchi and cracles at bases  [de-identified] :  2  + edema   [de-identified] : antalgic  unsteady  decreased  strength and tone  [de-identified] : senile  changes

## 2020-11-13 NOTE — DATA REVIEWED
[FreeTextEntry1] : all  blood work  urine culture  and radiology reviewed \par discussed with patient /caretakers   at length and in detail \par \par

## 2020-11-13 NOTE — HISTORY OF PRESENT ILLNESS
[Patient] : patient [Family Member] : family member [FreeTextEntry1] : CHF gait instability  [FreeTextEntry2] : \par Patient denies fever, cough, trouble breathing, rash, vomiting and diarrhea. Patient has not been in close contact with someone Covid positive.\par N95 mask ,gloves and eyewear used during visit  Total  face to  face time  is  30 minutes \par 89 y/o M w PMH of , CHF w EF of 65% (May 2019), HTN, CAD s/p PCI, \par s/p pacemaker, Afib on Warfarin, BPH, depression & gastritis \par patient is seen today for initial visit and enrollment into  a house call  program \par patient is homebound due  CHF \par  most of the history obtained from family member daughter Maria Antonia \par   today patient denies any chest  pains  palpitations     shortness  of breath at rest no weight changes  gained 3 pounds    over last week  / did not take metolazone / c/o mouth  dryness \par  patient  still follows with   specialists  nephrologist   who monitors  CKD\par last hospitalization  10/2020\par diet regular  appetite   good  \par dysphagia none \par Weight stable now  \par constipation uses  miralax \par incontinence none \par pressure/bed sores none \par Ambulates with  some assistance \par falls none recently \par Behavior good  can get frustrated  occasionally  and  on antidepressant  \par Mood good  \par  memory  good \par  sleep  OK \par sensory deficits  wears glasses  \par pain denies currenlty \par Medication refills done and reconciliation  done \par Goals of care discussed  daughter  wants full resuscitation \par

## 2020-11-13 NOTE — COUNSELING
[Normal Weight - ( BMI  <25 )] : normal weight - ( BMI  <25 ) [Weight counseling provided] : weight counseling provided [TLC diet recommended] : TLC diet recommended [DASH diet recommended] : DASH diet recommended [Medical/Nutritional supplementation as prescribed] : medical/nutritional supplementation as prescribed [Non - Smoker] : non-smoker [Use grab bars] : use grab bars [Medical alert] : medical alert [Use assistive device to avoid falls] : use assistive device to avoid falls [Remove clutter and unsafe carpeting to avoid falls] : remove clutter and unsafe carpeting to avoid falls [___] : [unfilled] [] : foot exam [Completed] : Aspirin use discussion completed [Decrease stress] : decrease stress [Decrease hospital use] : decrease hospital use [Minimize unnecessary interventions] : minimize unnecessary interventions [Maintain functional ability] : maintain functional ability [Discussed disease trajectory with patient/caregiver] : discussed disease trajectory with patient/caregiver [Likely to achieve goals/desired outcomes] : likely to achieve goals/desired outcomes [Patient/Caregiver has ___ understanding of disease process] : patient/caregiver has [unfilled] understanding of disease process [Advanced Directives discussed: ____] : Advanced directives discussed: [unfilled] [Full Code] : Code Status: Full Code [No Limitations] : Treatment Guidelines: No limitations [Long Term Intubation] : Intubation: Long term intubation [ - New patient with 2 or more chronic conditions; CCM discussed and patient-centered care plan established] : New patient with 2 or more chronic conditions; CCM discussed and patient-centered care plan established [de-identified] : discusssed but not  completed

## 2020-12-01 NOTE — PATIENT PROFILE ADULT - FUNCTIONAL SCREEN CURRENT LEVEL: EATING, MLM
CC:Diagnoses of Left sided sciatica, Type 2 diabetes mellitus without complication, without long-term current use of insulin (HCC), Mixed hyperlipidemia, Elevated alkaline phosphatase level, Intrinsic eczema, and Anxiety with flying were pertinent to this visit.    HISTORY OF PRESENT ILLNESS: Patient is a 83 y.o. female established patient who presents today to talk about her chronic health problems and to review the labs that she had done prior to the office visit.      Left sided sciatica  This is a chronic health problem where the patient did do x-rays of the lumbar spine come back showing degenerative subluxations at L2-3 and L4-5 as well as multilevel facet arthropathy and endplate spurring consistent with arthritis.  Patient really is not interested in a surgery at this time.  She does not take anything for arthritis and I would like to try her on a medication to see if we might be able to get her feeling better and not having as much trouble with her back.  She does say when her back acts up she can still get it to feel better with Aspercreme so a daily anti-inflammatory should work.    Type 2 diabetes mellitus without complication, without long-term current use of insulin (HCC)  This is a chronic health problem for which the patient takes Metformin extended release 500 mg once a day.  Her fasting glucose is excellent at 116 and her A1c is excellent at 7.0.  Her microalbumin/creatinine ratio is negative so no signs of any kidney problems from the diabetes.    Mixed hyperlipidemia  This is a chronic health problem for this patient where we started her on rosuvastatin 5 mg daily and it has completely normalized her cholesterol panel.  Total cholesterol is now down to 153, triglycerides 73, HDL went up to 70 which is protective for heart and her LDL is down to 68.  There is no liver dysfunction.  We will have her continue with meds.    Elevated alkaline phosphatase level  This is a chronic health problem for  which we did do isoenzymes this time.  On her comprehensive metabolic panel her alkaline phosphatase was 108 which is elevated according to their legend.  We then did isoenzymes where her total was 118 and there legend allows up to 120, liver function is normal at 73 bone function is normal at 45.  We will continue to just monitor.    Intrinsic eczema  This is a chronic health problem where this patient has a breakout that will come on either one of her ankles and currently she has a breakout on the right ankle lateral portion.  Is actually fairly large with classic eczema demarcations.  They have been trying over-the-counter medications and alternating to try and get this better including bag balm, cortisone 10 and others such as antiitch.  She states that this always itches and it is painful.  At this point were going to use triamcinolone ointment because the environment here is so dry I want to try and keep more moisture at the skin level for her.  They will do it twice a day until it clears up and then will hold onto it because it will recur      Patient Active Problem List    Diagnosis Date Noted   • Intrinsic eczema 12/01/2020   • Mucopurulent chronic bronchitis (HCC) 09/14/2020   • Left sided sciatica 09/14/2020   • Anxiety with flying 11/11/2019   • Lump in neck 10/31/2019   • Macrocytosis without anemia 10/07/2019   • Elevated alkaline phosphatase level 10/07/2019   • Osteopenia of lumbar spine 10/07/2019   • Carpal tunnel syndrome of right wrist 10/07/2019   • Mixed hyperlipidemia 01/30/2019   • Type 2 diabetes mellitus without complication, without long-term current use of insulin ([facility]) 01/15/2019   • Venous stasis dermatitis of left lower extremity 01/15/2019   • Gastroesophageal reflux disease without esophagitis 01/15/2019   • Obesity (BMI 30-39.9) 01/15/2019      Allergies:Patient has no known allergies.    Current Outpatient Medications   Medication Sig Dispense Refill   • meloxicam (MOBIC) 15 MG  tablet Take 1 Tab by mouth every day. 90 Tab 3   • triamcinolone acetonide (KENALOG) 0.1 % Ointment Apply a small amount to affected area twice daily till clear 453.6 g 3   • hydrOXYzine HCl (ATARAX) 25 MG Tab TAKE ONE TABLET BY MOUTH DAILY AS NEEDED FOR ANXIETY 90 Tab 3   • omeprazole (PRILOSEC) 20 MG delayed-release capsule Take 1 Cap by mouth every day. 90 Cap 3   • lisinopril (PRINIVIL) 5 MG Tab TAKE ONE TABLET BY MOUTH DAILY - PRINIVIL 90 Tab 3   • albuterol (PROVENTIL) 2.5mg/3ml Nebu Soln solution for nebulization 3 mL by Nebulization route every four hours as needed. 120 Bullet 3   • Respiratory Therapy Supplies (NEBULIZER) Device Nebulizer Device 1 Each 0   • metFORMIN ER (GLUCOPHAGE XR) 500 MG TABLET SR 24 HR Take 1 Tab by mouth every day. 90 Tab 3   • lisinopril (PRINIVIL) 10 MG Tab Take 10 mg by mouth every day.     • rosuvastatin (CRESTOR) 5 MG Tab TAKE ONE TABLET BY MOUTH EVERY EVENING 90 Tab 3   • albuterol (PROVENTIL) 2.5mg/0.5ml Nebu Soln 0.5 mL by Nebulization route 4 times a day for 90 days. 180 mL 3   • furosemide (LASIX) 20 MG Tab TAKE ONE TABLET BY MOUTH DAILY --LASIX 90 Tab 3   • gabapentin (NEURONTIN) 300 MG Cap Take 1 Cap by mouth 2 Times a Day. 180 Cap 3   • Melatonin 5 MG Tab TAKE ONE TABLET BY MOUTH AT BEDTIME AS NEEDED 90 Tab 0   • loratadine (CLARITIN) 10 MG Tab Take 1 Tab by mouth 1 time daily as needed (itching).       No current facility-administered medications for this visit.        Social History     Tobacco Use   • Smoking status: Former Smoker     Packs/day: 0.50     Years: 15.00     Pack years: 7.50     Types: Cigarettes     Quit date:      Years since quittin.9   • Smokeless tobacco: Never Used   Substance Use Topics   • Alcohol use: No   • Drug use: No     Social History     Social History Narrative    Retired - housekeeping       Family History   Problem Relation Age of Onset   • Cancer Mother         stomach   • Heart Disease Father 65   • Hypertension Father    •  "Hyperlipidemia Father    • Heart Disease Sister    • Cancer Sister         uterine   • Diabetes Brother    • Diabetes Brother    • Heart Attack Brother    • Heart Disease Brother         heart disease CABG 4        ROS:     - Constitutional:  Negative for fever, chills, unexpected weight change, and fatigue/generalized weakness.    - HEENT:  Negative for headaches, vision changes, hearing changes, ear pain, ear discharge, rhinorrhea, sinus congestion, sore throat, and neck pain.      - Respiratory: Negative for cough, sputum production, chest congestion, dyspnea, wheezing, and crackles.      - Cardiovascular: Negative for chest pain, palpitations, orthopnea, and bilateral lower extremity edema.     - Gastrointestinal: Positive for an increasing constipation, we discussed over-the-counter ways to improve this.  Negative for heartburn, nausea, vomiting, abdominal pain, hematochezia, melena, diarrhea, , and greasy/foul-smelling stools.     - Genitourinary: Negative for dysuria, polyuria, hematuria, pyuria, urinary urgency, and urinary incontinence.     - Musculoskeletal: Negative for myalgias, back pain, and joint pain.     - Skin: Negative for rash, itching, cyanotic skin color change.     - Neurological: Negative for dizziness, tingling, tremors, focal sensory deficit, focal weakness and headaches.     - Endo/Heme/Allergies: Does not bruise/bleed easily.     - Psychiatric/Behavioral: Negative for depression, suicidal/homicidal ideation and memory loss.          - NOTE: All other systems reviewed and are negative, except as in HPI.      Exam:    /50 (BP Location: Left arm, Patient Position: Sitting, BP Cuff Size: Adult)   Pulse 81   Temp 37.2 °C (98.9 °F) (Temporal)   Resp 14   Ht 1.549 m (5' 1\")   Wt 76.9 kg (169 lb 9.6 oz)   SpO2 (!) 83%  Body mass index is 32.05 kg/m².    General:  Well nourished, well developed female in NAD  Head is grossly normal.  Neck: Supple without JVD or bruit. Thyroid is not " enlarged.  Pulmonary: Clear to ausculation and percussion.  Normal effort. No rales, ronchi, or wheezing.  Cardiovascular: Regular rate and rhythm without murmur. Carotid and radial pulses are intactno clubbing, cyanosis, or edema.  Extremities: Right lower extremity has classic eczema and a patch that 8 cm x 6 cm on the lateral ankle with excoriation but no signs of secondary infection.  LABS: 10/28/2020: Results reviewed and discussed with the patient, questions answered.    Please note that this dictation was created using voice recognition software. I have made every reasonable attempt to correct obvious errors, but I expect that there are errors of grammar and possibly content that I did not discover before finalizing the note.    Assessment/Plan:  1. Left sided sciatica  Stable, patient has degenerative arthritis throughout the lumbar spine according to her x-rays.  Since she is able to make this better with Aspercreme I think a daily NSAID would help her long-term.  We will try meloxicam 15 mg daily.    2. Type 2 diabetes mellitus without complication, without long-term current use of insulin (HCC)  Controlled, continue with current meds and lifestyle.    - HEMOGLOBIN A1C; Future    3. Mixed hyperlipidemia  Controlled, continue with current meds and lifestyle.    - Comp Metabolic Panel; Future  - Lipid Profile; Future    4. Elevated alkaline phosphatase level  Controlled, continue with current meds and lifestyle.      5. Intrinsic eczema  Uncontrolled, we will utilize triamcinolone ointment applied twice daily until the area clears and then realizing that it will recur.    6. Anxiety with flying  Stable with use of hydroxyzine we will give her refills today.  - hydrOXYzine HCl (ATARAX) 25 MG Tab; TAKE ONE TABLET BY MOUTH DAILY AS NEEDED FOR ANXIETY  Dispense: 90 Tab; Refill: 3          0 = independent

## 2021-01-01 ENCOUNTER — LABORATORY RESULT (OUTPATIENT)
Age: 86
End: 2021-01-01

## 2021-01-01 ENCOUNTER — INPATIENT (INPATIENT)
Facility: HOSPITAL | Age: 86
LOS: 2 days | DRG: 291 | End: 2021-09-10
Attending: HOSPITALIST | Admitting: HOSPITALIST
Payer: MEDICARE

## 2021-01-01 ENCOUNTER — TRANSCRIPTION ENCOUNTER (OUTPATIENT)
Age: 86
End: 2021-01-01

## 2021-01-01 ENCOUNTER — APPOINTMENT (OUTPATIENT)
Dept: HOME HEALTH SERVICES | Facility: HOME HEALTH | Age: 86
End: 2021-01-01
Payer: MEDICARE

## 2021-01-01 ENCOUNTER — NON-APPOINTMENT (OUTPATIENT)
Age: 86
End: 2021-01-01

## 2021-01-01 ENCOUNTER — RX RENEWAL (OUTPATIENT)
Age: 86
End: 2021-01-01

## 2021-01-01 ENCOUNTER — EMERGENCY (EMERGENCY)
Facility: HOSPITAL | Age: 86
LOS: 0 days | Discharge: HOME | End: 2021-08-04
Attending: EMERGENCY MEDICINE
Payer: MEDICARE

## 2021-01-01 ENCOUNTER — APPOINTMENT (OUTPATIENT)
Dept: UROLOGY | Facility: CLINIC | Age: 86
End: 2021-01-01
Payer: MEDICARE

## 2021-01-01 ENCOUNTER — APPOINTMENT (OUTPATIENT)
Age: 86
End: 2021-01-01

## 2021-01-01 ENCOUNTER — APPOINTMENT (OUTPATIENT)
Dept: HOME HEALTH SERVICES | Facility: HOME HEALTH | Age: 86
End: 2021-01-01

## 2021-01-01 ENCOUNTER — EMERGENCY (EMERGENCY)
Facility: HOSPITAL | Age: 86
LOS: 0 days | Discharge: HOME | End: 2021-07-26
Attending: FAMILY MEDICINE
Payer: MEDICARE

## 2021-01-01 VITALS
DIASTOLIC BLOOD PRESSURE: 51 MMHG | RESPIRATION RATE: 17 BRPM | SYSTOLIC BLOOD PRESSURE: 109 MMHG | TEMPERATURE: 98 F | OXYGEN SATURATION: 98 % | HEART RATE: 60 BPM

## 2021-01-01 VITALS
HEART RATE: 60 BPM | SYSTOLIC BLOOD PRESSURE: 108 MMHG | TEMPERATURE: 98.3 F | DIASTOLIC BLOOD PRESSURE: 60 MMHG | OXYGEN SATURATION: 94 %

## 2021-01-01 VITALS
DIASTOLIC BLOOD PRESSURE: 70 MMHG | RESPIRATION RATE: 15 BRPM | HEART RATE: 60 BPM | SYSTOLIC BLOOD PRESSURE: 109 MMHG | OXYGEN SATURATION: 95 %

## 2021-01-01 VITALS
TEMPERATURE: 97 F | SYSTOLIC BLOOD PRESSURE: 135 MMHG | WEIGHT: 199.96 LBS | HEIGHT: 78 IN | RESPIRATION RATE: 20 BRPM | OXYGEN SATURATION: 100 % | DIASTOLIC BLOOD PRESSURE: 48 MMHG | HEART RATE: 59 BPM

## 2021-01-01 VITALS
TEMPERATURE: 97.2 F | OXYGEN SATURATION: 96 % | DIASTOLIC BLOOD PRESSURE: 60 MMHG | SYSTOLIC BLOOD PRESSURE: 102 MMHG | HEART RATE: 60 BPM | RESPIRATION RATE: 16 BRPM

## 2021-01-01 VITALS
DIASTOLIC BLOOD PRESSURE: 42 MMHG | RESPIRATION RATE: 18 BRPM | HEART RATE: 60 BPM | SYSTOLIC BLOOD PRESSURE: 133 MMHG | TEMPERATURE: 97 F | OXYGEN SATURATION: 100 %

## 2021-01-01 VITALS
RESPIRATION RATE: 18 BRPM | DIASTOLIC BLOOD PRESSURE: 62 MMHG | OXYGEN SATURATION: 97 % | WEIGHT: 169.98 LBS | TEMPERATURE: 97 F | SYSTOLIC BLOOD PRESSURE: 143 MMHG | HEART RATE: 61 BPM | HEIGHT: 78 IN

## 2021-01-01 VITALS
SYSTOLIC BLOOD PRESSURE: 100 MMHG | BODY MASS INDEX: 19.37 KG/M2 | DIASTOLIC BLOOD PRESSURE: 60 MMHG | TEMPERATURE: 97.4 F | OXYGEN SATURATION: 92 % | WEIGHT: 135 LBS | HEART RATE: 62 BPM

## 2021-01-01 VITALS
HEART RATE: 60 BPM | DIASTOLIC BLOOD PRESSURE: 70 MMHG | OXYGEN SATURATION: 91 % | RESPIRATION RATE: 16 BRPM | SYSTOLIC BLOOD PRESSURE: 140 MMHG

## 2021-01-01 VITALS — WEIGHT: 145.06 LBS | HEIGHT: 78 IN

## 2021-01-01 VITALS
RESPIRATION RATE: 16 BRPM | TEMPERATURE: 97.5 F | SYSTOLIC BLOOD PRESSURE: 98 MMHG | DIASTOLIC BLOOD PRESSURE: 60 MMHG | HEART RATE: 60 BPM | OXYGEN SATURATION: 95 %

## 2021-01-01 DIAGNOSIS — N39.0 URINARY TRACT INFECTION, SITE NOT SPECIFIED: ICD-10-CM

## 2021-01-01 DIAGNOSIS — Z90.89 ACQUIRED ABSENCE OF OTHER ORGANS: ICD-10-CM

## 2021-01-01 DIAGNOSIS — N13.8 BENIGN PROSTATIC HYPERPLASIA WITH LOWER URINARY TRACT SYMPMS: ICD-10-CM

## 2021-01-01 DIAGNOSIS — R33.9 RETENTION OF URINE, UNSPECIFIED: ICD-10-CM

## 2021-01-01 DIAGNOSIS — Z86.79 PERSONAL HISTORY OF OTHER DISEASES OF THE CIRCULATORY SYSTEM: ICD-10-CM

## 2021-01-01 DIAGNOSIS — Z23 ENCOUNTER FOR IMMUNIZATION: ICD-10-CM

## 2021-01-01 DIAGNOSIS — Z79.899 OTHER LONG TERM (CURRENT) DRUG THERAPY: ICD-10-CM

## 2021-01-01 DIAGNOSIS — G47.00 INSOMNIA, UNSPECIFIED: ICD-10-CM

## 2021-01-01 DIAGNOSIS — Z87.19 PERSONAL HISTORY OF OTHER DISEASES OF THE DIGESTIVE SYSTEM: ICD-10-CM

## 2021-01-01 DIAGNOSIS — Z95.5 PRESENCE OF CORONARY ANGIOPLASTY IMPLANT AND GRAFT: Chronic | ICD-10-CM

## 2021-01-01 DIAGNOSIS — N40.1 BENIGN PROSTATIC HYPERPLASIA WITH LOWER URINARY TRACT SYMPMS: ICD-10-CM

## 2021-01-01 DIAGNOSIS — J47.9 BRONCHIECTASIS, UNCOMPLICATED: ICD-10-CM

## 2021-01-01 DIAGNOSIS — I70.0 ATHEROSCLEROSIS OF AORTA: ICD-10-CM

## 2021-01-01 DIAGNOSIS — R53.83 OTHER FATIGUE: ICD-10-CM

## 2021-01-01 DIAGNOSIS — Z79.02 LONG TERM (CURRENT) USE OF ANTITHROMBOTICS/ANTIPLATELETS: ICD-10-CM

## 2021-01-01 DIAGNOSIS — I48.91 UNSPECIFIED ATRIAL FIBRILLATION: ICD-10-CM

## 2021-01-01 DIAGNOSIS — Z87.438 PERSONAL HISTORY OF OTHER DISEASES OF MALE GENITAL ORGANS: ICD-10-CM

## 2021-01-01 DIAGNOSIS — I50.40 UNSPECIFIED COMBINED SYSTOLIC (CONGESTIVE) AND DIASTOLIC (CONGESTIVE) HEART FAILURE: ICD-10-CM

## 2021-01-01 DIAGNOSIS — Z95.0 PRESENCE OF CARDIAC PACEMAKER: ICD-10-CM

## 2021-01-01 DIAGNOSIS — Z95.5 PRESENCE OF CORONARY ANGIOPLASTY IMPLANT AND GRAFT: ICD-10-CM

## 2021-01-01 DIAGNOSIS — I11.0 HYPERTENSIVE HEART DISEASE WITH HEART FAILURE: ICD-10-CM

## 2021-01-01 DIAGNOSIS — Z79.01 LONG TERM (CURRENT) USE OF ANTICOAGULANTS: ICD-10-CM

## 2021-01-01 DIAGNOSIS — Z78.9 OTHER SPECIFIED HEALTH STATUS: ICD-10-CM

## 2021-01-01 DIAGNOSIS — K59.09 OTHER CONSTIPATION: ICD-10-CM
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
## 2021-01-01 DIAGNOSIS — I50.9 HEART FAILURE, UNSPECIFIED: ICD-10-CM

## 2021-01-01 DIAGNOSIS — N18.4 CHRONIC KIDNEY DISEASE, STAGE 4 (SEVERE): ICD-10-CM

## 2021-01-01 DIAGNOSIS — I50.42 CHRONIC COMBINED SYSTOLIC (CONGESTIVE) AND DIASTOLIC (CONGESTIVE) HEART FAILURE: ICD-10-CM

## 2021-01-01 DIAGNOSIS — Z90.49 ACQUIRED ABSENCE OF OTHER SPECIFIED PARTS OF DIGESTIVE TRACT: Chronic | ICD-10-CM

## 2021-01-01 DIAGNOSIS — R11.0 NAUSEA: ICD-10-CM

## 2021-01-01 DIAGNOSIS — H40.50X0 GLAUCOMA SECONDARY TO OTHER EYE DISORDERS, UNSPECIFIED EYE, STAGE UNSPECIFIED: ICD-10-CM

## 2021-01-01 DIAGNOSIS — Z86.59 PERSONAL HISTORY OF OTHER MENTAL AND BEHAVIORAL DISORDERS: ICD-10-CM

## 2021-01-01 DIAGNOSIS — E44.1 MILD PROTEIN-CALORIE MALNUTRITION: ICD-10-CM

## 2021-01-01 DIAGNOSIS — T14.8XXA OTHER INJURY OF UNSPECIFIED BODY REGION, INITIAL ENCOUNTER: ICD-10-CM

## 2021-01-01 DIAGNOSIS — F33.9 MAJOR DEPRESSIVE DISORDER, RECURRENT, UNSPECIFIED: ICD-10-CM

## 2021-01-01 DIAGNOSIS — N40.0 BENIGN PROSTATIC HYPERPLASIA WITHOUT LOWER URINARY TRACT SYMPMS: ICD-10-CM

## 2021-01-01 DIAGNOSIS — R42 DIZZINESS AND GIDDINESS: ICD-10-CM
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
## 2021-01-01 DIAGNOSIS — J84.9 INTERSTITIAL PULMONARY DISEASE, UNSPECIFIED: ICD-10-CM

## 2021-01-01 LAB
-  AMIKACIN: SIGNIFICANT CHANGE UP
-  AMPICILLIN: SIGNIFICANT CHANGE UP
-  AZTREONAM: SIGNIFICANT CHANGE UP
-  CEFEPIME: SIGNIFICANT CHANGE UP
-  CEFTAZIDIME: SIGNIFICANT CHANGE UP
-  CIPROFLOXACIN: SIGNIFICANT CHANGE UP
-  CIPROFLOXACIN: SIGNIFICANT CHANGE UP
-  GENTAMICIN: SIGNIFICANT CHANGE UP
-  IMIPENEM: SIGNIFICANT CHANGE UP
-  LEVOFLOXACIN: SIGNIFICANT CHANGE UP
-  LEVOFLOXACIN: SIGNIFICANT CHANGE UP
-  MEROPENEM: SIGNIFICANT CHANGE UP
-  NITROFURANTOIN: SIGNIFICANT CHANGE UP
-  PIPERACILLIN/TAZOBACTAM: SIGNIFICANT CHANGE UP
-  TETRACYCLINE: SIGNIFICANT CHANGE UP
-  TOBRAMYCIN: SIGNIFICANT CHANGE UP
-  VANCOMYCIN: SIGNIFICANT CHANGE UP
A1C WITH ESTIMATED AVERAGE GLUCOSE RESULT: 5.7 % — HIGH (ref 4–5.6)
ALBUMIN SERPL ELPH-MCNC: 2.7 G/DL — LOW (ref 3.3–5)
ALBUMIN SERPL ELPH-MCNC: 2.7 G/DL — LOW (ref 3.3–5)
ALP SERPL-CCNC: 82 U/L — SIGNIFICANT CHANGE UP (ref 40–120)
ALP SERPL-CCNC: 86 U/L — SIGNIFICANT CHANGE UP (ref 40–120)
ALT FLD-CCNC: 17 U/L — SIGNIFICANT CHANGE UP (ref 12–78)
ALT FLD-CCNC: 20 U/L — SIGNIFICANT CHANGE UP (ref 12–78)
ANION GAP SERPL CALC-SCNC: 5 MMOL/L — SIGNIFICANT CHANGE UP (ref 5–17)
ANION GAP SERPL CALC-SCNC: 6 MMOL/L — SIGNIFICANT CHANGE UP (ref 5–17)
ANION GAP SERPL CALC-SCNC: 8 MMOL/L — SIGNIFICANT CHANGE UP (ref 5–17)
APPEARANCE UR: CLEAR — SIGNIFICANT CHANGE UP
APTT BLD: 40.3 SEC — HIGH (ref 27.5–35.5)
AST SERPL-CCNC: 19 U/L — SIGNIFICANT CHANGE UP (ref 15–37)
AST SERPL-CCNC: 22 U/L — SIGNIFICANT CHANGE UP (ref 15–37)
BASE EXCESS BLDV CALC-SCNC: -3.3 MMOL/L — LOW (ref -2–2)
BASOPHILS # BLD AUTO: 0.03 K/UL — SIGNIFICANT CHANGE UP (ref 0–0.2)
BASOPHILS # BLD AUTO: 0.04 K/UL — SIGNIFICANT CHANGE UP (ref 0–0.2)
BASOPHILS # BLD AUTO: 0.04 K/UL — SIGNIFICANT CHANGE UP (ref 0–0.2)
BASOPHILS # BLD AUTO: 0.07 K/UL — SIGNIFICANT CHANGE UP (ref 0–0.2)
BASOPHILS NFR BLD AUTO: 0.5 % — SIGNIFICANT CHANGE UP (ref 0–2)
BASOPHILS NFR BLD AUTO: 0.6 % — SIGNIFICANT CHANGE UP (ref 0–2)
BASOPHILS NFR BLD AUTO: 0.6 % — SIGNIFICANT CHANGE UP (ref 0–2)
BASOPHILS NFR BLD AUTO: 1.1 % — SIGNIFICANT CHANGE UP (ref 0–2)
BILIRUB SERPL-MCNC: 0.4 MG/DL — SIGNIFICANT CHANGE UP (ref 0.2–1.2)
BILIRUB SERPL-MCNC: 0.4 MG/DL — SIGNIFICANT CHANGE UP (ref 0.2–1.2)
BILIRUB UR-MCNC: NEGATIVE — SIGNIFICANT CHANGE UP
BUN SERPL-MCNC: 74 MG/DL — HIGH (ref 7–23)
BUN SERPL-MCNC: 75 MG/DL — HIGH (ref 7–23)
BUN SERPL-MCNC: 84 MG/DL — HIGH (ref 7–23)
BUN SERPL-MCNC: 86 MG/DL — HIGH (ref 7–23)
BUN SERPL-MCNC: 87 MG/DL — HIGH (ref 7–23)
CALCIUM SERPL-MCNC: 8.6 MG/DL — SIGNIFICANT CHANGE UP (ref 8.5–10.1)
CALCIUM SERPL-MCNC: 8.7 MG/DL — SIGNIFICANT CHANGE UP (ref 8.5–10.1)
CHLORIDE SERPL-SCNC: 107 MMOL/L — SIGNIFICANT CHANGE UP (ref 96–108)
CHLORIDE SERPL-SCNC: 108 MMOL/L — SIGNIFICANT CHANGE UP (ref 96–108)
CHLORIDE SERPL-SCNC: 109 MMOL/L — HIGH (ref 96–108)
CHLORIDE SERPL-SCNC: 112 MMOL/L — HIGH (ref 96–108)
CHLORIDE SERPL-SCNC: 96 MMOL/L — SIGNIFICANT CHANGE UP (ref 96–108)
CHOLEST SERPL-MCNC: 135 MG/DL — SIGNIFICANT CHANGE UP
CO2 SERPL-SCNC: 23 MMOL/L — SIGNIFICANT CHANGE UP (ref 22–31)
CO2 SERPL-SCNC: 24 MMOL/L — SIGNIFICANT CHANGE UP (ref 22–31)
CO2 SERPL-SCNC: 25 MMOL/L — SIGNIFICANT CHANGE UP (ref 22–31)
CO2 SERPL-SCNC: 26 MMOL/L — SIGNIFICANT CHANGE UP (ref 22–31)
CO2 SERPL-SCNC: 27 MMOL/L — SIGNIFICANT CHANGE UP (ref 22–31)
COLOR SPEC: YELLOW — SIGNIFICANT CHANGE UP
COVID-19 SPIKE DOMAIN AB INTERP: POSITIVE
COVID-19 SPIKE DOMAIN ANTIBODY RESULT: 1.79 U/ML — HIGH
CREAT SERPL-MCNC: 2.15 MG/DL — HIGH (ref 0.5–1.3)
CREAT SERPL-MCNC: 2.24 MG/DL — HIGH (ref 0.5–1.3)
CREAT SERPL-MCNC: 2.36 MG/DL — HIGH (ref 0.5–1.3)
CREAT SERPL-MCNC: 2.48 MG/DL — HIGH (ref 0.5–1.3)
CREAT SERPL-MCNC: 2.57 MG/DL — HIGH (ref 0.5–1.3)
CULTURE RESULTS: NO GROWTH — SIGNIFICANT CHANGE UP
CULTURE RESULTS: SIGNIFICANT CHANGE UP
CULTURE RESULTS: SIGNIFICANT CHANGE UP
DIFF PNL FLD: ABNORMAL
DIFF PNL FLD: ABNORMAL
DIFF PNL FLD: NEGATIVE — SIGNIFICANT CHANGE UP
EOSINOPHIL # BLD AUTO: 0.05 K/UL — SIGNIFICANT CHANGE UP (ref 0–0.5)
EOSINOPHIL # BLD AUTO: 0.08 K/UL — SIGNIFICANT CHANGE UP (ref 0–0.5)
EOSINOPHIL # BLD AUTO: 0.1 K/UL — SIGNIFICANT CHANGE UP (ref 0–0.5)
EOSINOPHIL # BLD AUTO: 0.12 K/UL — SIGNIFICANT CHANGE UP (ref 0–0.5)
EOSINOPHIL NFR BLD AUTO: 1 % — SIGNIFICANT CHANGE UP (ref 0–6)
EOSINOPHIL NFR BLD AUTO: 1.2 % — SIGNIFICANT CHANGE UP (ref 0–6)
EOSINOPHIL NFR BLD AUTO: 1.3 % — SIGNIFICANT CHANGE UP (ref 0–6)
EOSINOPHIL NFR BLD AUTO: 1.9 % — SIGNIFICANT CHANGE UP (ref 0–6)
ESTIMATED AVERAGE GLUCOSE: 117 MG/DL — HIGH (ref 68–114)
GLUCOSE BLDC GLUCOMTR-MCNC: 106 MG/DL — HIGH (ref 70–99)
GLUCOSE SERPL-MCNC: 101 MG/DL — HIGH (ref 70–99)
GLUCOSE SERPL-MCNC: 103 MG/DL — HIGH (ref 70–99)
GLUCOSE SERPL-MCNC: 85 MG/DL — SIGNIFICANT CHANGE UP (ref 70–99)
GLUCOSE SERPL-MCNC: 86 MG/DL — SIGNIFICANT CHANGE UP (ref 70–99)
GLUCOSE SERPL-MCNC: 92 MG/DL — SIGNIFICANT CHANGE UP (ref 70–99)
GLUCOSE UR QL: NEGATIVE MG/DL — SIGNIFICANT CHANGE UP
HCO3 BLDV-SCNC: 22 MMOL/L — SIGNIFICANT CHANGE UP (ref 21–29)
HCT VFR BLD CALC: 24.2 % — LOW (ref 39–50)
HCT VFR BLD CALC: 25.3 % — LOW (ref 39–50)
HCT VFR BLD CALC: 25.6 % — LOW (ref 39–50)
HCT VFR BLD CALC: 26.4 % — LOW (ref 39–50)
HCT VFR BLD CALC: 26.9 % — LOW (ref 39–50)
HDLC SERPL-MCNC: 65 MG/DL — SIGNIFICANT CHANGE UP
HGB BLD-MCNC: 7.8 G/DL — LOW (ref 13–17)
HGB BLD-MCNC: 8.1 G/DL — LOW (ref 13–17)
HGB BLD-MCNC: 8.3 G/DL — LOW (ref 13–17)
HGB BLD-MCNC: 8.6 G/DL — LOW (ref 13–17)
HGB BLD-MCNC: 8.8 G/DL — LOW (ref 13–17)
IMM GRANULOCYTES NFR BLD AUTO: 0.2 % — SIGNIFICANT CHANGE UP (ref 0–1.5)
IMM GRANULOCYTES NFR BLD AUTO: 0.8 % — SIGNIFICANT CHANGE UP (ref 0–1.5)
IMM GRANULOCYTES NFR BLD AUTO: 0.9 % — SIGNIFICANT CHANGE UP (ref 0–1.5)
IMM GRANULOCYTES NFR BLD AUTO: 1.1 % — SIGNIFICANT CHANGE UP (ref 0–1.5)
INR BLD: 2.75 RATIO — HIGH (ref 0.88–1.16)
INR BLD: 2.98 RATIO — HIGH (ref 0.88–1.16)
INR BLD: 3.77 RATIO — HIGH (ref 0.88–1.16)
INR PPP: 1.57 RATIO
INR PPP: 1.73 RATIO
INR PPP: 1.82 RATIO
INR PPP: 1.88 RATIO
INR PPP: 2.2 RATIO
INR PPP: 2.24 RATIO
INR PPP: 2.27 RATIO
INR PPP: 2.3 RATIO
INR PPP: 2.3 RATIO
INR PPP: 2.32 RATIO
INR PPP: 2.51 RATIO
INR PPP: 2.52 RATIO
INR PPP: 2.58 RATIO
INR PPP: 2.6 RATIO
INR PPP: 2.82 RATIO
INR PPP: 2.91 RATIO
KETONES UR-MCNC: NEGATIVE — SIGNIFICANT CHANGE UP
LACTATE SERPL-SCNC: 1.1 MMOL/L — SIGNIFICANT CHANGE UP (ref 0.7–2)
LEUKOCYTE ESTERASE UR-ACNC: ABNORMAL
LEUKOCYTE ESTERASE UR-ACNC: ABNORMAL
LEUKOCYTE ESTERASE UR-ACNC: NEGATIVE — SIGNIFICANT CHANGE UP
LIPID PNL WITH DIRECT LDL SERPL: 62 MG/DL — SIGNIFICANT CHANGE UP
LYMPHOCYTES # BLD AUTO: 0.8 K/UL — LOW (ref 1–3.3)
LYMPHOCYTES # BLD AUTO: 0.8 K/UL — LOW (ref 1–3.3)
LYMPHOCYTES # BLD AUTO: 0.84 K/UL — LOW (ref 1–3.3)
LYMPHOCYTES # BLD AUTO: 0.95 K/UL — LOW (ref 1–3.3)
LYMPHOCYTES # BLD AUTO: 13 % — SIGNIFICANT CHANGE UP (ref 13–44)
LYMPHOCYTES # BLD AUTO: 14.7 % — SIGNIFICANT CHANGE UP (ref 13–44)
LYMPHOCYTES # BLD AUTO: 16.6 % — SIGNIFICANT CHANGE UP (ref 13–44)
LYMPHOCYTES # BLD AUTO: 9.5 % — LOW (ref 13–44)
MAGNESIUM SERPL-MCNC: 2.2 MG/DL — SIGNIFICANT CHANGE UP (ref 1.6–2.6)
MCHC RBC-ENTMCNC: 31.6 GM/DL — LOW (ref 32–36)
MCHC RBC-ENTMCNC: 31.9 PG — SIGNIFICANT CHANGE UP (ref 27–34)
MCHC RBC-ENTMCNC: 32 PG — SIGNIFICANT CHANGE UP (ref 27–34)
MCHC RBC-ENTMCNC: 32.2 GM/DL — SIGNIFICANT CHANGE UP (ref 32–36)
MCHC RBC-ENTMCNC: 32.2 PG — SIGNIFICANT CHANGE UP (ref 27–34)
MCHC RBC-ENTMCNC: 32.2 PG — SIGNIFICANT CHANGE UP (ref 27–34)
MCHC RBC-ENTMCNC: 32.4 PG — SIGNIFICANT CHANGE UP (ref 27–34)
MCHC RBC-ENTMCNC: 32.6 GM/DL — SIGNIFICANT CHANGE UP (ref 32–36)
MCHC RBC-ENTMCNC: 32.7 GM/DL — SIGNIFICANT CHANGE UP (ref 32–36)
MCHC RBC-ENTMCNC: 32.8 GM/DL — SIGNIFICANT CHANGE UP (ref 32–36)
MCV RBC AUTO: 100.4 FL — HIGH (ref 80–100)
MCV RBC AUTO: 101.2 FL — HIGH (ref 80–100)
MCV RBC AUTO: 97.5 FL — SIGNIFICANT CHANGE UP (ref 80–100)
MCV RBC AUTO: 98.1 FL — SIGNIFICANT CHANGE UP (ref 80–100)
MCV RBC AUTO: 98.9 FL — SIGNIFICANT CHANGE UP (ref 80–100)
METHOD TYPE: SIGNIFICANT CHANGE UP
METHOD TYPE: SIGNIFICANT CHANGE UP
MONOCYTES # BLD AUTO: 0.43 K/UL — SIGNIFICANT CHANGE UP (ref 0–0.9)
MONOCYTES # BLD AUTO: 0.48 K/UL — SIGNIFICANT CHANGE UP (ref 0–0.9)
MONOCYTES # BLD AUTO: 0.52 K/UL — SIGNIFICANT CHANGE UP (ref 0–0.9)
MONOCYTES # BLD AUTO: 0.62 K/UL — SIGNIFICANT CHANGE UP (ref 0–0.9)
MONOCYTES NFR BLD AUTO: 6.7 % — SIGNIFICANT CHANGE UP (ref 2–14)
MONOCYTES NFR BLD AUTO: 7.4 % — SIGNIFICANT CHANGE UP (ref 2–14)
MONOCYTES NFR BLD AUTO: 8.4 % — SIGNIFICANT CHANGE UP (ref 2–14)
MONOCYTES NFR BLD AUTO: 9.5 % — SIGNIFICANT CHANGE UP (ref 2–14)
NEUTROPHILS # BLD AUTO: 3.66 K/UL — SIGNIFICANT CHANGE UP (ref 1.8–7.4)
NEUTROPHILS # BLD AUTO: 4.68 K/UL — SIGNIFICANT CHANGE UP (ref 1.8–7.4)
NEUTROPHILS # BLD AUTO: 4.82 K/UL — SIGNIFICANT CHANGE UP (ref 1.8–7.4)
NEUTROPHILS # BLD AUTO: 6.77 K/UL — SIGNIFICANT CHANGE UP (ref 1.8–7.4)
NEUTROPHILS NFR BLD AUTO: 72.1 % — SIGNIFICANT CHANGE UP (ref 43–77)
NEUTROPHILS NFR BLD AUTO: 74.7 % — SIGNIFICANT CHANGE UP (ref 43–77)
NEUTROPHILS NFR BLD AUTO: 75.9 % — SIGNIFICANT CHANGE UP (ref 43–77)
NEUTROPHILS NFR BLD AUTO: 80.3 % — HIGH (ref 43–77)
NITRITE UR-MCNC: NEGATIVE — SIGNIFICANT CHANGE UP
NON HDL CHOLESTEROL: 70 MG/DL — SIGNIFICANT CHANGE UP
NT-PROBNP SERPL-SCNC: 6995 PG/ML — HIGH (ref 0–450)
ORGANISM # SPEC MICROSCOPIC CNT: SIGNIFICANT CHANGE UP
PCO2 BLDV: 45 MMHG — SIGNIFICANT CHANGE UP (ref 35–50)
PH BLDV: 7.31 — LOW (ref 7.35–7.45)
PH UR: 5 — SIGNIFICANT CHANGE UP (ref 5–8)
PH UR: 6 — SIGNIFICANT CHANGE UP (ref 5–8)
PH UR: 6 — SIGNIFICANT CHANGE UP (ref 5–8)
PLATELET # BLD AUTO: 285 K/UL — SIGNIFICANT CHANGE UP (ref 150–400)
PLATELET # BLD AUTO: 312 K/UL — SIGNIFICANT CHANGE UP (ref 150–400)
PLATELET # BLD AUTO: 322 K/UL — SIGNIFICANT CHANGE UP (ref 150–400)
PLATELET # BLD AUTO: 329 K/UL — SIGNIFICANT CHANGE UP (ref 150–400)
PLATELET # BLD AUTO: 351 K/UL — SIGNIFICANT CHANGE UP (ref 150–400)
PO2 BLDV: 41 MMHG — SIGNIFICANT CHANGE UP (ref 25–45)
POTASSIUM SERPL-MCNC: 3.6 MMOL/L — SIGNIFICANT CHANGE UP (ref 3.5–5.3)
POTASSIUM SERPL-MCNC: 4 MMOL/L — SIGNIFICANT CHANGE UP (ref 3.5–5.3)
POTASSIUM SERPL-MCNC: 4.1 MMOL/L — SIGNIFICANT CHANGE UP (ref 3.5–5.3)
POTASSIUM SERPL-MCNC: 4.4 MMOL/L — SIGNIFICANT CHANGE UP (ref 3.5–5.3)
POTASSIUM SERPL-MCNC: 4.7 MMOL/L — SIGNIFICANT CHANGE UP (ref 3.5–5.3)
POTASSIUM SERPL-SCNC: 3.6 MMOL/L — SIGNIFICANT CHANGE UP (ref 3.5–5.3)
POTASSIUM SERPL-SCNC: 4 MMOL/L — SIGNIFICANT CHANGE UP (ref 3.5–5.3)
POTASSIUM SERPL-SCNC: 4.1 MMOL/L — SIGNIFICANT CHANGE UP (ref 3.5–5.3)
POTASSIUM SERPL-SCNC: 4.4 MMOL/L — SIGNIFICANT CHANGE UP (ref 3.5–5.3)
POTASSIUM SERPL-SCNC: 4.7 MMOL/L — SIGNIFICANT CHANGE UP (ref 3.5–5.3)
PROCALCITONIN SERPL-MCNC: 0.21 NG/ML — HIGH (ref 0.02–0.1)
PROT SERPL-MCNC: 7.2 GM/DL — SIGNIFICANT CHANGE UP (ref 6–8.3)
PROT SERPL-MCNC: 7.6 GM/DL — SIGNIFICANT CHANGE UP (ref 6–8.3)
PROT UR-MCNC: 15 MG/DL
PROT UR-MCNC: 30 MG/DL
PROT UR-MCNC: NEGATIVE MG/DL — SIGNIFICANT CHANGE UP
PROTHROM AB SERPL-ACNC: 30.7 SEC — HIGH (ref 10.6–13.6)
PROTHROM AB SERPL-ACNC: 32.8 SEC — HIGH (ref 10.6–13.6)
PROTHROM AB SERPL-ACNC: 41.4 SEC — HIGH (ref 10.6–13.6)
PT BLD: 18.3 SEC
PT BLD: 19.9 SEC
PT BLD: 21 SEC
PT BLD: 21.5 SEC
PT BLD: 25.2 SEC
PT BLD: 25.4 SEC
PT BLD: 25.8 SEC
PT BLD: 26.1 SEC
PT BLD: 26.1 SEC
PT BLD: 26.5 SEC
PT BLD: 28.6 SEC
PT BLD: 28.7 SEC
PT BLD: 29.1 SEC
PT BLD: 29.6 SEC
PT BLD: 31.7 SEC
PT BLD: 32.6 SEC
RBC # BLD: 2.41 M/UL — LOW (ref 4.2–5.8)
RBC # BLD: 2.53 M/UL — LOW (ref 4.2–5.8)
RBC # BLD: 2.58 M/UL — LOW (ref 4.2–5.8)
RBC # BLD: 2.67 M/UL — LOW (ref 4.2–5.8)
RBC # BLD: 2.76 M/UL — LOW (ref 4.2–5.8)
RBC # FLD: 15.5 % — HIGH (ref 10.3–14.5)
RBC # FLD: 15.5 % — HIGH (ref 10.3–14.5)
RBC # FLD: 15.7 % — HIGH (ref 10.3–14.5)
RBC # FLD: 15.8 % — HIGH (ref 10.3–14.5)
RBC # FLD: 16 % — HIGH (ref 10.3–14.5)
SAO2 % BLDV: 64 % — LOW (ref 67–88)
SARS-COV-2 IGG+IGM SERPL QL IA: 1.79 U/ML — HIGH
SARS-COV-2 IGG+IGM SERPL QL IA: POSITIVE
SARS-COV-2 RNA SPEC QL NAA+PROBE: SIGNIFICANT CHANGE UP
SODIUM SERPL-SCNC: 129 MMOL/L — LOW (ref 135–145)
SODIUM SERPL-SCNC: 136 MMOL/L — SIGNIFICANT CHANGE UP (ref 135–145)
SODIUM SERPL-SCNC: 139 MMOL/L — SIGNIFICANT CHANGE UP (ref 135–145)
SODIUM SERPL-SCNC: 140 MMOL/L — SIGNIFICANT CHANGE UP (ref 135–145)
SODIUM SERPL-SCNC: 144 MMOL/L — SIGNIFICANT CHANGE UP (ref 135–145)
SP GR SPEC: 1.01 — SIGNIFICANT CHANGE UP (ref 1.01–1.02)
SPECIMEN SOURCE: SIGNIFICANT CHANGE UP
TRIGL SERPL-MCNC: 38 MG/DL — SIGNIFICANT CHANGE UP
TROPONIN I SERPL-MCNC: 0.04 NG/ML — SIGNIFICANT CHANGE UP (ref 0.01–0.04)
TROPONIN I SERPL-MCNC: 0.04 NG/ML — SIGNIFICANT CHANGE UP (ref 0.01–0.04)
TROPONIN I SERPL-MCNC: 0.05 NG/ML — HIGH (ref 0.01–0.04)
UROBILINOGEN FLD QL: NEGATIVE MG/DL — SIGNIFICANT CHANGE UP
WBC # BLD: 5.07 K/UL — SIGNIFICANT CHANGE UP (ref 3.8–10.5)
WBC # BLD: 6.17 K/UL — SIGNIFICANT CHANGE UP (ref 3.8–10.5)
WBC # BLD: 6.41 K/UL — SIGNIFICANT CHANGE UP (ref 3.8–10.5)
WBC # BLD: 6.45 K/UL — SIGNIFICANT CHANGE UP (ref 3.8–10.5)
WBC # BLD: 8.42 K/UL — SIGNIFICANT CHANGE UP (ref 3.8–10.5)
WBC # FLD AUTO: 5.07 K/UL — SIGNIFICANT CHANGE UP (ref 3.8–10.5)
WBC # FLD AUTO: 6.17 K/UL — SIGNIFICANT CHANGE UP (ref 3.8–10.5)
WBC # FLD AUTO: 6.41 K/UL — SIGNIFICANT CHANGE UP (ref 3.8–10.5)
WBC # FLD AUTO: 6.45 K/UL — SIGNIFICANT CHANGE UP (ref 3.8–10.5)
WBC # FLD AUTO: 8.42 K/UL — SIGNIFICANT CHANGE UP (ref 3.8–10.5)

## 2021-01-01 PROCEDURE — 85025 COMPLETE CBC W/AUTO DIFF WBC: CPT

## 2021-01-01 PROCEDURE — 93010 ELECTROCARDIOGRAM REPORT: CPT

## 2021-01-01 PROCEDURE — 71045 X-RAY EXAM CHEST 1 VIEW: CPT | Mod: 26

## 2021-01-01 PROCEDURE — 51702 INSERT TEMP BLADDER CATH: CPT

## 2021-01-01 PROCEDURE — 71250 CT THORAX DX C-: CPT | Mod: 26

## 2021-01-01 PROCEDURE — 85730 THROMBOPLASTIN TIME PARTIAL: CPT

## 2021-01-01 PROCEDURE — 99223 1ST HOSP IP/OBS HIGH 75: CPT

## 2021-01-01 PROCEDURE — 99285 EMERGENCY DEPT VISIT HI MDM: CPT

## 2021-01-01 PROCEDURE — 81003 URINALYSIS AUTO W/O SCOPE: CPT

## 2021-01-01 PROCEDURE — 81001 URINALYSIS AUTO W/SCOPE: CPT

## 2021-01-01 PROCEDURE — 97530 THERAPEUTIC ACTIVITIES: CPT | Mod: GP

## 2021-01-01 PROCEDURE — 82962 GLUCOSE BLOOD TEST: CPT

## 2021-01-01 PROCEDURE — 97116 GAIT TRAINING THERAPY: CPT | Mod: GP

## 2021-01-01 PROCEDURE — 80048 BASIC METABOLIC PNL TOTAL CA: CPT

## 2021-01-01 PROCEDURE — 74176 CT ABD & PELVIS W/O CONTRAST: CPT | Mod: 26,MA

## 2021-01-01 PROCEDURE — 87086 URINE CULTURE/COLONY COUNT: CPT

## 2021-01-01 PROCEDURE — 80061 LIPID PANEL: CPT

## 2021-01-01 PROCEDURE — 99349 HOME/RES VST EST MOD MDM 40: CPT

## 2021-01-01 PROCEDURE — 99232 SBSQ HOSP IP/OBS MODERATE 35: CPT

## 2021-01-01 PROCEDURE — 87186 SC STD MICRODIL/AGAR DIL: CPT

## 2021-01-01 PROCEDURE — 71250 CT THORAX DX C-: CPT

## 2021-01-01 PROCEDURE — 83036 HEMOGLOBIN GLYCOSYLATED A1C: CPT

## 2021-01-01 PROCEDURE — 99490 CHRNC CARE MGMT STAFF 1ST 20: CPT

## 2021-01-01 PROCEDURE — 97162 PT EVAL MOD COMPLEX 30 MIN: CPT | Mod: GP

## 2021-01-01 PROCEDURE — 74176 CT ABD & PELVIS W/O CONTRAST: CPT

## 2021-01-01 PROCEDURE — 83735 ASSAY OF MAGNESIUM: CPT

## 2021-01-01 PROCEDURE — 99284 EMERGENCY DEPT VISIT MOD MDM: CPT | Mod: 25

## 2021-01-01 PROCEDURE — 80053 COMPREHEN METABOLIC PANEL: CPT

## 2021-01-01 PROCEDURE — 99439 CHRNC CARE MGMT STAF EA ADDL: CPT

## 2021-01-01 PROCEDURE — 85027 COMPLETE CBC AUTOMATED: CPT

## 2021-01-01 PROCEDURE — 36415 COLL VENOUS BLD VENIPUNCTURE: CPT

## 2021-01-01 PROCEDURE — 93306 TTE W/DOPPLER COMPLETE: CPT | Mod: 26

## 2021-01-01 PROCEDURE — 85610 PROTHROMBIN TIME: CPT

## 2021-01-01 PROCEDURE — 99233 SBSQ HOSP IP/OBS HIGH 50: CPT

## 2021-01-01 PROCEDURE — 84145 PROCALCITONIN (PCT): CPT

## 2021-01-01 PROCEDURE — 93306 TTE W/DOPPLER COMPLETE: CPT

## 2021-01-01 PROCEDURE — 93005 ELECTROCARDIOGRAM TRACING: CPT

## 2021-01-01 PROCEDURE — 0031A: CPT

## 2021-01-01 PROCEDURE — 84484 ASSAY OF TROPONIN QUANT: CPT

## 2021-01-01 PROCEDURE — 99203 OFFICE O/P NEW LOW 30 MIN: CPT

## 2021-01-01 PROCEDURE — 99283 EMERGENCY DEPT VISIT LOW MDM: CPT

## 2021-01-01 PROCEDURE — 86769 SARS-COV-2 COVID-19 ANTIBODY: CPT

## 2021-01-01 PROCEDURE — 99213 OFFICE O/P EST LOW 20 MIN: CPT

## 2021-01-01 PROCEDURE — 99282 EMERGENCY DEPT VISIT SF MDM: CPT | Mod: 25

## 2021-01-01 RX ORDER — WARFARIN SODIUM 2.5 MG/1
1 TABLET ORAL
Qty: 0 | Refills: 0 | DISCHARGE

## 2021-01-01 RX ORDER — METOLAZONE 2.5 MG/1
2.5 TABLET ORAL
Qty: 4 | Refills: 0 | Status: ACTIVE | COMMUNITY
Start: 2020-01-01

## 2021-01-01 RX ORDER — WARFARIN SODIUM 2.5 MG/1
2 TABLET ORAL ONCE
Refills: 0 | Status: COMPLETED | OUTPATIENT
Start: 2021-01-01 | End: 2021-01-01

## 2021-01-01 RX ORDER — ATORVASTATIN CALCIUM 80 MG/1
40 TABLET, FILM COATED ORAL AT BEDTIME
Refills: 0 | Status: DISCONTINUED | OUTPATIENT
Start: 2021-01-01 | End: 2021-01-01

## 2021-01-01 RX ORDER — TAMSULOSIN HYDROCHLORIDE 0.4 MG/1
0.4 CAPSULE ORAL
Qty: 90 | Refills: 3 | Status: ACTIVE | COMMUNITY
Start: 2020-01-01 | End: 1900-01-01

## 2021-01-01 RX ORDER — TIMOLOL 0.5 %
1 DROPS OPHTHALMIC (EYE) DAILY
Refills: 0 | Status: DISCONTINUED | OUTPATIENT
Start: 2021-01-01 | End: 2021-01-01

## 2021-01-01 RX ORDER — FUROSEMIDE 40 MG/1
40 TABLET ORAL TWICE DAILY
Qty: 180 | Refills: 2 | Status: DISCONTINUED | COMMUNITY
Start: 2021-01-01 | End: 2021-01-01

## 2021-01-01 RX ORDER — POTASSIUM CHLORIDE 20 MEQ
10 PACKET (EA) ORAL DAILY
Refills: 0 | Status: DISCONTINUED | OUTPATIENT
Start: 2021-01-01 | End: 2021-01-01

## 2021-01-01 RX ORDER — WARFARIN 1 MG/1
1 TABLET ORAL
Qty: 90 | Refills: 0 | Status: ACTIVE | COMMUNITY
Start: 2021-01-01 | End: 1900-01-01

## 2021-01-01 RX ORDER — FUROSEMIDE 40 MG
1 TABLET ORAL
Qty: 0 | Refills: 0 | DISCHARGE

## 2021-01-01 RX ORDER — CHOLECALCIFEROL (VITAMIN D3) 125 MCG
1 CAPSULE ORAL
Qty: 0 | Refills: 0 | DISCHARGE

## 2021-01-01 RX ORDER — ERGOCALCIFEROL 1.25 MG/1
1.25 MG CAPSULE, LIQUID FILLED ORAL
Qty: 4 | Refills: 0 | Status: ACTIVE | COMMUNITY
Start: 2021-01-01

## 2021-01-01 RX ORDER — TAMSULOSIN HYDROCHLORIDE 0.4 MG/1
0.4 CAPSULE ORAL AT BEDTIME
Refills: 0 | Status: DISCONTINUED | OUTPATIENT
Start: 2021-01-01 | End: 2021-01-01

## 2021-01-01 RX ORDER — SERTRALINE 25 MG/1
25 TABLET, FILM COATED ORAL DAILY
Qty: 90 | Refills: 1 | Status: ACTIVE | COMMUNITY
Start: 2020-01-01

## 2021-01-01 RX ORDER — CEFTRIAXONE 500 MG/1
1000 INJECTION, POWDER, FOR SOLUTION INTRAMUSCULAR; INTRAVENOUS EVERY 24 HOURS
Refills: 0 | Status: DISCONTINUED | OUTPATIENT
Start: 2021-01-01 | End: 2021-01-01

## 2021-01-01 RX ORDER — SERTRALINE 25 MG/1
25 TABLET, FILM COATED ORAL DAILY
Refills: 0 | Status: DISCONTINUED | OUTPATIENT
Start: 2021-01-01 | End: 2021-01-01

## 2021-01-01 RX ORDER — FUROSEMIDE 40 MG
40 TABLET ORAL DAILY
Refills: 0 | Status: DISCONTINUED | OUTPATIENT
Start: 2021-01-01 | End: 2021-01-01

## 2021-01-01 RX ORDER — POLYETHYLENE GLYCOL 3350 17 G/17G
17 POWDER, FOR SOLUTION ORAL DAILY
Refills: 0 | Status: DISCONTINUED | OUTPATIENT
Start: 2021-01-01 | End: 2021-01-01

## 2021-01-01 RX ORDER — METOPROLOL TARTRATE 50 MG
12.5 TABLET ORAL DAILY
Refills: 0 | Status: DISCONTINUED | OUTPATIENT
Start: 2021-01-01 | End: 2021-01-01

## 2021-01-01 RX ORDER — TRAZODONE HYDROCHLORIDE 50 MG/1
50 TABLET ORAL AT BEDTIME
Qty: 60 | Refills: 0 | Status: ACTIVE | COMMUNITY
Start: 2021-01-01 | End: 1900-01-01

## 2021-01-01 RX ORDER — METOPROLOL SUCCINATE 25 MG/1
25 TABLET, EXTENDED RELEASE ORAL DAILY
Qty: 30 | Refills: 1 | Status: ACTIVE | COMMUNITY
Start: 2020-01-01 | End: 1900-01-01

## 2021-01-01 RX ORDER — BETHANECHOL CHLORIDE 25 MG
1 TABLET ORAL
Qty: 0 | Refills: 0 | DISCHARGE

## 2021-01-01 RX ORDER — CYPROHEPTADINE HYDROCHLORIDE 4 MG/1
4 TABLET ORAL
Qty: 180 | Refills: 0 | Status: DISCONTINUED | COMMUNITY
Start: 2020-08-02 | End: 2021-01-01

## 2021-01-01 RX ORDER — OMEPRAZOLE 20 MG/1
20 CAPSULE, DELAYED RELEASE ORAL DAILY
Qty: 28 | Refills: 0 | Status: DISCONTINUED | COMMUNITY
Start: 2020-01-01 | End: 2021-01-01

## 2021-01-01 RX ORDER — METOPROLOL TARTRATE 50 MG
0.5 TABLET ORAL
Qty: 0 | Refills: 0 | DISCHARGE

## 2021-01-01 RX ORDER — ATORVASTATIN CALCIUM 40 MG/1
40 TABLET, FILM COATED ORAL
Qty: 28 | Refills: 3 | Status: ACTIVE | COMMUNITY
Start: 2020-01-01

## 2021-01-01 RX ORDER — CEFEPIME 1 G/1
500 INJECTION, POWDER, FOR SOLUTION INTRAMUSCULAR; INTRAVENOUS EVERY 12 HOURS
Refills: 0 | Status: DISCONTINUED | OUTPATIENT
Start: 2021-01-01 | End: 2021-01-01

## 2021-01-01 RX ORDER — TIMOLOL 0.5 %
1 DROPS OPHTHALMIC (EYE)
Qty: 0 | Refills: 0 | DISCHARGE

## 2021-01-01 RX ORDER — RANITIDINE HYDROCHLORIDE 150 MG/1
1 TABLET, FILM COATED ORAL
Qty: 0 | Refills: 0 | DISCHARGE

## 2021-01-01 RX ORDER — FUROSEMIDE 80 MG/1
80 TABLET ORAL
Qty: 90 | Refills: 0 | Status: ACTIVE | COMMUNITY
Start: 2020-01-01

## 2021-01-01 RX ORDER — MUPIROCIN 20 MG/G
2 OINTMENT TOPICAL 3 TIMES DAILY
Qty: 1 | Refills: 0 | Status: ACTIVE | COMMUNITY
Start: 2021-01-01

## 2021-01-01 RX ORDER — NITROFURANTOIN (MONOHYDRATE/MACROCRYSTALS) 25; 75 MG/1; MG/1
100 CAPSULE ORAL TWICE DAILY
Qty: 6 | Refills: 0 | Status: ACTIVE | COMMUNITY
Start: 2021-01-01 | End: 1900-01-01

## 2021-01-01 RX ORDER — FINASTERIDE 5 MG/1
5 TABLET, FILM COATED ORAL DAILY
Qty: 90 | Refills: 3 | Status: ACTIVE | COMMUNITY
Start: 2020-01-01 | End: 1900-01-01

## 2021-01-01 RX ORDER — NITROFURANTOIN MACROCRYSTAL 50 MG
1 CAPSULE ORAL
Qty: 14 | Refills: 0
Start: 2021-01-01 | End: 2021-01-01

## 2021-01-01 RX ORDER — FINASTERIDE 5 MG/1
5 TABLET, FILM COATED ORAL DAILY
Refills: 0 | Status: DISCONTINUED | OUTPATIENT
Start: 2021-01-01 | End: 2021-01-01

## 2021-01-01 RX ORDER — ATORVASTATIN CALCIUM 80 MG/1
40 TABLET, FILM COATED ORAL DAILY
Refills: 0 | Status: DISCONTINUED | OUTPATIENT
Start: 2021-01-01 | End: 2021-01-01

## 2021-01-01 RX ORDER — AZITHROMYCIN 500 MG/1
500 TABLET, FILM COATED ORAL ONCE
Refills: 0 | Status: COMPLETED | OUTPATIENT
Start: 2021-01-01 | End: 2021-01-01

## 2021-01-01 RX ORDER — TIMOLOL MALEATE 2.5 MG/ML
0.25 SOLUTION/ DROPS OPHTHALMIC
Qty: 1 | Refills: 3 | Status: ACTIVE | COMMUNITY
Start: 2020-01-01

## 2021-01-01 RX ORDER — POTASSIUM CHLORIDE 20 MEQ
1 PACKET (EA) ORAL
Qty: 0 | Refills: 0 | DISCHARGE

## 2021-01-01 RX ORDER — TAMSULOSIN HYDROCHLORIDE 0.4 MG/1
1 CAPSULE ORAL
Qty: 0 | Refills: 0 | DISCHARGE

## 2021-01-01 RX ORDER — AZITHROMYCIN 500 MG/1
500 TABLET, FILM COATED ORAL EVERY 24 HOURS
Refills: 0 | Status: DISCONTINUED | OUTPATIENT
Start: 2021-01-01 | End: 2021-01-01

## 2021-01-01 RX ORDER — ONDANSETRON 8 MG/1
8 TABLET, ORALLY DISINTEGRATING ORAL DAILY
Qty: 30 | Refills: 0 | Status: ACTIVE | COMMUNITY
Start: 2021-01-01

## 2021-01-01 RX ORDER — LORATADINE 10 MG
17 TABLET,DISINTEGRATING ORAL DAILY
Qty: 2 | Refills: 5 | Status: ACTIVE | COMMUNITY
Start: 2021-01-01

## 2021-01-01 RX ORDER — TRAZODONE HCL 50 MG
25 TABLET ORAL AT BEDTIME
Refills: 0 | Status: DISCONTINUED | OUTPATIENT
Start: 2021-01-01 | End: 2021-01-01

## 2021-01-01 RX ORDER — AZITHROMYCIN 500 MG/1
TABLET, FILM COATED ORAL
Refills: 0 | Status: DISCONTINUED | OUTPATIENT
Start: 2021-01-01 | End: 2021-01-01

## 2021-01-01 RX ORDER — FUROSEMIDE 40 MG
80 TABLET ORAL ONCE
Refills: 0 | Status: COMPLETED | OUTPATIENT
Start: 2021-01-01 | End: 2021-01-01

## 2021-01-01 RX ORDER — FINASTERIDE 5 MG/1
1 TABLET, FILM COATED ORAL
Qty: 0 | Refills: 0 | DISCHARGE

## 2021-01-01 RX ORDER — ASPIRIN/CALCIUM CARB/MAGNESIUM 324 MG
81 TABLET ORAL ONCE
Refills: 0 | Status: COMPLETED | OUTPATIENT
Start: 2021-01-01 | End: 2021-01-01

## 2021-01-01 RX ORDER — WARFARIN 2 MG/1
2 TABLET ORAL
Qty: 90 | Refills: 2 | Status: ACTIVE | COMMUNITY
Start: 2020-01-01 | End: 1900-01-01

## 2021-01-01 RX ORDER — TRAZODONE HCL 50 MG
0.5 TABLET ORAL
Qty: 0 | Refills: 0 | DISCHARGE

## 2021-01-01 RX ORDER — BETHANECHOL CHLORIDE 25 MG/1
25 TABLET ORAL EVERY 8 HOURS
Qty: 45 | Refills: 0 | Status: DISCONTINUED | COMMUNITY
Start: 2021-01-01 | End: 2021-01-01

## 2021-01-01 RX ORDER — POTASSIUM CHLORIDE 750 MG/1
10 CAPSULE, EXTENDED RELEASE ORAL
Qty: 90 | Refills: 3 | Status: ACTIVE | COMMUNITY
Start: 2020-01-01

## 2021-01-01 RX ORDER — CHOLECALCIFEROL (VITAMIN D3) 1250 MCG
1.25 MG CAPSULE ORAL
Qty: 12 | Refills: 4 | Status: ACTIVE | COMMUNITY
Start: 2020-01-01

## 2021-01-01 RX ADMIN — TAMSULOSIN HYDROCHLORIDE 0.4 MILLIGRAM(S): 0.4 CAPSULE ORAL at 22:41

## 2021-01-01 RX ADMIN — ATORVASTATIN CALCIUM 40 MILLIGRAM(S): 80 TABLET, FILM COATED ORAL at 21:58

## 2021-01-01 RX ADMIN — ATORVASTATIN CALCIUM 40 MILLIGRAM(S): 80 TABLET, FILM COATED ORAL at 21:10

## 2021-01-01 RX ADMIN — POLYETHYLENE GLYCOL 3350 17 GRAM(S): 17 POWDER, FOR SOLUTION ORAL at 18:38

## 2021-01-01 RX ADMIN — Medication 1 DROP(S): at 13:34

## 2021-01-01 RX ADMIN — Medication 80 MILLIGRAM(S): at 18:56

## 2021-01-01 RX ADMIN — Medication 10 MILLIEQUIVALENT(S): at 12:17

## 2021-01-01 RX ADMIN — FINASTERIDE 5 MILLIGRAM(S): 5 TABLET, FILM COATED ORAL at 12:16

## 2021-01-01 RX ADMIN — SERTRALINE 25 MILLIGRAM(S): 25 TABLET, FILM COATED ORAL at 09:05

## 2021-01-01 RX ADMIN — Medication 25 MILLIGRAM(S): at 21:10

## 2021-01-01 RX ADMIN — Medication 40 MILLIGRAM(S): at 06:28

## 2021-01-01 RX ADMIN — CEFTRIAXONE 1000 MILLIGRAM(S): 500 INJECTION, POWDER, FOR SOLUTION INTRAMUSCULAR; INTRAVENOUS at 22:40

## 2021-01-01 RX ADMIN — SERTRALINE 25 MILLIGRAM(S): 25 TABLET, FILM COATED ORAL at 12:17

## 2021-01-01 RX ADMIN — Medication 12.5 MILLIGRAM(S): at 09:44

## 2021-01-01 RX ADMIN — Medication 12.5 MILLIGRAM(S): at 12:16

## 2021-01-01 RX ADMIN — CEFTRIAXONE 1000 MILLIGRAM(S): 500 INJECTION, POWDER, FOR SOLUTION INTRAMUSCULAR; INTRAVENOUS at 21:17

## 2021-01-01 RX ADMIN — Medication 1 DROP(S): at 09:46

## 2021-01-01 RX ADMIN — WARFARIN SODIUM 2 MILLIGRAM(S): 2.5 TABLET ORAL at 16:17

## 2021-01-01 RX ADMIN — AZITHROMYCIN 255 MILLIGRAM(S): 500 TABLET, FILM COATED ORAL at 22:41

## 2021-01-01 RX ADMIN — WARFARIN SODIUM 2 MILLIGRAM(S): 2.5 TABLET ORAL at 22:01

## 2021-01-01 RX ADMIN — AZITHROMYCIN 255 MILLIGRAM(S): 500 TABLET, FILM COATED ORAL at 21:58

## 2021-01-01 RX ADMIN — Medication 12.5 MILLIGRAM(S): at 09:05

## 2021-01-01 RX ADMIN — Medication 10 MILLIEQUIVALENT(S): at 09:45

## 2021-01-01 RX ADMIN — Medication 10 MILLIEQUIVALENT(S): at 09:04

## 2021-01-01 RX ADMIN — TAMSULOSIN HYDROCHLORIDE 0.4 MILLIGRAM(S): 0.4 CAPSULE ORAL at 21:58

## 2021-01-01 RX ADMIN — AZITHROMYCIN 255 MILLIGRAM(S): 500 TABLET, FILM COATED ORAL at 21:27

## 2021-01-01 RX ADMIN — SERTRALINE 25 MILLIGRAM(S): 25 TABLET, FILM COATED ORAL at 09:45

## 2021-01-01 RX ADMIN — POLYETHYLENE GLYCOL 3350 17 GRAM(S): 17 POWDER, FOR SOLUTION ORAL at 09:45

## 2021-01-01 RX ADMIN — CEFTRIAXONE 1000 MILLIGRAM(S): 500 INJECTION, POWDER, FOR SOLUTION INTRAMUSCULAR; INTRAVENOUS at 22:00

## 2021-01-01 RX ADMIN — Medication 1 DROP(S): at 09:03

## 2021-01-01 RX ADMIN — TAMSULOSIN HYDROCHLORIDE 0.4 MILLIGRAM(S): 0.4 CAPSULE ORAL at 21:10

## 2021-01-01 RX ADMIN — Medication 40 MILLIGRAM(S): at 09:05

## 2021-01-01 RX ADMIN — FINASTERIDE 5 MILLIGRAM(S): 5 TABLET, FILM COATED ORAL at 09:05

## 2021-01-01 RX ADMIN — Medication 81 MILLIGRAM(S): at 21:27

## 2021-01-01 RX ADMIN — ATORVASTATIN CALCIUM 40 MILLIGRAM(S): 80 TABLET, FILM COATED ORAL at 22:40

## 2021-01-01 RX ADMIN — FINASTERIDE 5 MILLIGRAM(S): 5 TABLET, FILM COATED ORAL at 09:45

## 2021-01-01 RX ADMIN — Medication 25 MILLIGRAM(S): at 22:41

## 2021-01-01 RX ADMIN — Medication 25 MILLIGRAM(S): at 22:01

## 2021-01-01 RX ADMIN — Medication 40 MILLIGRAM(S): at 19:00

## 2021-01-04 PROBLEM — H40.50X0: Status: ACTIVE | Noted: 2021-01-01

## 2021-01-04 PROBLEM — K59.09 CHRONIC CONSTIPATION: Status: ACTIVE | Noted: 2021-01-01

## 2021-03-05 NOTE — CURRENT MEDS
[Medication and Allergies Reconciled] : medication and allergies reconciled [High Risk Medications Reviewed and Reconciled (Beers Criteria)] : high risk medications reviewed and reconciled [Reviewed patient reported medication adherence from Comprehensive Assessment] : Reviewed patient reported medication adherence from comprehensive assessment [Non adherent to medications] : Patient is non adherent to medications as prescribed [de-identified] : reluctant to take  diuretics   educated about  risk and benefits

## 2021-03-05 NOTE — CHRONIC CARE ASSESSMENT
[PPS Score: ____] : Palliative Performance Scale (PPS) Score: [unfilled] [FAST Score: ____] : Functional Assessment Scale (FAST) Score: [unfilled] [Class IV] : [employer] [place] [employer] Association Class Output: Class IV [Patient Non-adherent to care plan] : patient non-adherent to care plan

## 2021-03-05 NOTE — REASON FOR VISIT
[Follow-Up] : a follow-up visit [Family Member] : family member [Pre-Visit Preparation] : pre-visit preparation was done [Intercurrent Specialty/Sub-specialty Visits] : the patient has no intercurrent specialty/sub-specialty visits

## 2021-03-05 NOTE — HISTORY OF PRESENT ILLNESS
[Patient] : patient [Family Member] : family member [FreeTextEntry1] : CHF gait instability  [FreeTextEntry2] : \par Patient denies fever, cough, trouble breathing, rash, vomiting and diarrhea. Patient has not been in close contact with someone Covid positive.\par N95 mask ,gloves and eyewear used during visit  Total  face to  face time  is  30 minutes \par 89 y/o M w PMH of , CHF w EF of 65% (May 2019), HTN, CAD s/p PCI, \par s/p pacemaker, Afib on Warfarin, BPH, depression \par patient  is seen today for a follow  up  and  has been  stable  since last visit  with  no new major developments  complains, hospitalizations  and no changes with medications  and   unchanged chronic  condition \par patient is homebound due  CHF \par  most of the history obtained from family member daughter Maria Antonia \par   today patient denies any chest  pains  palpitations     shortness  of breath at rest  but is short of breath  on minimal exertion   explained to daughter it can be sign of angina  and need for  cardiac  w/u daughter  declines  \par  patient  still follows with   specialists  nephrologist   who monitors  CKD\par  recent blood work  urine culture  and radiology reviewed  with daughter  \par discussed with patient /caretakers   at length and in detail \par  patient denies any  stomach issues and daughter unaware  of   ulcers  or  GI bleed \par  will discontinue  omeprazole \par  and monitor  \par  The patient was educated regarding their condition, treatment options as well as prescribed course of treatment. \par Risks and benefits as well as alternatives to the proposed treatment were also provided to the patient \par patient was  given the opportunity to have all questions answered in detail \par diet regular  appetite   good  \par dysphagia none \par Weight stable now  \par constipation uses  miralax \par incontinence none \par pressure/bed sores none \par Ambulates with  some assistance \par falls none recently \par Behavior good  can get frustrated  occasionally  and  on antidepressant   daughter does not want to  discontinue  \par Mood good  \par  memory  good \par  sleep  OK \par sensory deficits  wears glasses  \par pain denies currently \par Medication refills done and reconciliation  done \par Goals of care discussed  daughter  wants full resuscitation \par

## 2021-03-05 NOTE — PHYSICAL EXAM
[Normal Sclera/Conjunctiva] : normal sclera/conjunctiva [No JVD] : no jugular venous distention [Normal Rate] : heart rate was normal  [Regular Rhythm] : with a regular rhythm [No Motor Deficits] : the motor exam was normal [No Gross Sensory Deficits] : no gross sensory deficits [Oriented x3] : oriented to person, place, and time [Normal Affect] : the affect was normal [de-identified] : elderly  male cooperative   looking younger than stated age  [de-identified] : crackles at bases  but good air entry  [de-identified] : 1   + edema   improved for last visit  [de-identified] : antalgic  unsteady  decreased  strength and tone  [de-identified] : senile  changes

## 2021-03-05 NOTE — COUNSELING
[Normal Weight - ( BMI  <25 )] : normal weight - ( BMI  <25 ) [Weight counseling provided] : weight counseling provided [TLC diet recommended] : TLC diet recommended [DASH diet recommended] : DASH diet recommended [Medical/Nutritional supplementation as prescribed] : medical/nutritional supplementation as prescribed [Non - Smoker] : non-smoker [Use grab bars] : use grab bars [Medical alert] : medical alert [Use assistive device to avoid falls] : use assistive device to avoid falls [Remove clutter and unsafe carpeting to avoid falls] : remove clutter and unsafe carpeting to avoid falls [___] : [unfilled] [] : foot exam [Completed] : Aspirin use discussion completed [Decrease stress] : decrease stress [Decrease hospital use] : decrease hospital use [Minimize unnecessary interventions] : minimize unnecessary interventions [Maintain functional ability] : maintain functional ability [Discussed disease trajectory with patient/caregiver] : discussed disease trajectory with patient/caregiver [Likely to achieve goals/desired outcomes] : likely to achieve goals/desired outcomes [Patient/Caregiver has ___ understanding of disease process] : patient/caregiver has [unfilled] understanding of disease process [Advanced Directives discussed: ____] : Advanced directives discussed: [unfilled] [Full Code] : Code Status: Full Code [No Limitations] : Treatment Guidelines: No limitations [Long Term Intubation] : Intubation: Long term intubation [de-identified] : discusssed but not  completed

## 2021-03-19 PROBLEM — Z23 NEED FOR COVID-19 VACCINE: Status: ACTIVE | Noted: 2021-01-01

## 2021-04-29 PROBLEM — R11.0 NAUSEA: Status: ACTIVE | Noted: 2021-01-01

## 2021-05-01 PROBLEM — E44.1 MALNUTRITION OF MILD DEGREE: Status: ACTIVE | Noted: 2020-01-01

## 2021-05-01 PROBLEM — J84.9 INTERSTITIAL LUNG DISEASE: Status: ACTIVE | Noted: 2020-01-01

## 2021-05-01 NOTE — PHYSICAL EXAM
[Normal Sclera/Conjunctiva] : normal sclera/conjunctiva [Normal Rate] : heart rate was normal  [No JVD] : no jugular venous distention [Regular Rhythm] : with a regular rhythm [No Motor Deficits] : the motor exam was normal [No Gross Sensory Deficits] : no gross sensory deficits [Oriented x3] : oriented to person, place, and time [Normal Affect] : the affect was normal [de-identified] : crackles at bases  but good air entry  [de-identified] : elderly  male cooperative   looking younger than stated age  [de-identified] : 1   + edema   improved for last visit  [de-identified] : antalgic  unsteady  decreased  strength and tone  [de-identified] : senile  changes

## 2021-05-01 NOTE — COUNSELING
[Normal Weight - ( BMI  <25 )] : normal weight - ( BMI  <25 ) [Weight counseling provided] : weight counseling provided [TLC diet recommended] : TLC diet recommended [DASH diet recommended] : DASH diet recommended [Medical/Nutritional supplementation as prescribed] : medical/nutritional supplementation as prescribed [Non - Smoker] : non-smoker [Use grab bars] : use grab bars [Medical alert] : medical alert [Use assistive device to avoid falls] : use assistive device to avoid falls [Remove clutter and unsafe carpeting to avoid falls] : remove clutter and unsafe carpeting to avoid falls [___] : [unfilled] [Completed] : Aspirin use discussion completed [] : foot exam [Decrease stress] : decrease stress [Decrease hospital use] : decrease hospital use [Minimize unnecessary interventions] : minimize unnecessary interventions [Maintain functional ability] : maintain functional ability [Discussed disease trajectory with patient/caregiver] : discussed disease trajectory with patient/caregiver [Likely to achieve goals/desired outcomes] : likely to achieve goals/desired outcomes [Patient/Caregiver has ___ understanding of disease process] : patient/caregiver has [unfilled] understanding of disease process [Advanced Directives discussed: ____] : Advanced directives discussed: [unfilled] [Full Code] : Code Status: Full Code [No Limitations] : Treatment Guidelines: No limitations [Long Term Intubation] : Intubation: Long term intubation [de-identified] : discusssed but not  completed

## 2021-05-01 NOTE — CURRENT MEDS
[High Risk Medications Reviewed and Reconciled (Beers Criteria)] : high risk medications reviewed and reconciled [Medication and Allergies Reconciled] : medication and allergies reconciled [Reviewed patient reported medication adherence from Comprehensive Assessment] : Reviewed patient reported medication adherence from comprehensive assessment [Non adherent to medications] : Patient is non adherent to medications as prescribed [de-identified] : reluctant to take  diuretics   educated about  risk and benefits

## 2021-05-01 NOTE — HISTORY OF PRESENT ILLNESS
[Patient] : patient [Family Member] : family member [FreeTextEntry1] : CHF gait instability  [FreeTextEntry2] : \par Patient denies fever, cough, trouble breathing, rash, vomiting and diarrhea. Patient has not been in close contact with someone Covid positive.\par N95 mask ,gloves and eyewear used during visit  Total  face to  face time  is  30 minutes \par 89 y/o M w PMH of , CHF w EF of 65% (May 2019), HTN, CAD s/p PCI, \par s/p pacemaker, Afib on Warfarin, BPH, depression \par patient  is seen today for a follow  up  and  has been  stable  since last visit  with  no new major developments  complains, hospitalizations  and no changes with medications  and   unchanged chronic  condition \par patient is homebound due  CHF \par  most of the history obtained from family member daughter Maria Antonia \par   today patient denies any chest  pains  palpitations     shortness  of breath at rest  but is short of breath  on minimal exertion   explained to daughter it can be sign of angina  and need for  cardiac  w/u daughter  declines  \par  patient  still follows with   specialists  nephrologist   who monitors  CKD\par patient had been stable  on current dose of  lasix and metolazone \par occasionally c/o nausea   no vomiting no  black stools  \par  daughter agrees  on monitoring   and use of zofran  as needed  \par  The patient was educated regarding their condition, treatment options as well as prescribed course of treatment. \par Risks and benefits as well as alternatives to the proposed treatment were also provided to the patient \par patient was  given the opportunity to have all questions answered in detail \par diet regular  appetite   good  \par dysphagia none \par Weight stable now  \par constipation uses  miralax \par incontinence none \par pressure/bed sores none \par Ambulates with  some assistance \par falls none recently \par Behavior good  can get frustrated  occasionally  and  on antidepressant   daughter does not want to  discontinue  \par Mood good  \par  memory  good \par  sleep  OK \par sensory deficits  wears glasses  \par pain denies currently \par Medication refills done and reconciliation  done \par Goals of care discussed  daughter  wants full resuscitation \par

## 2021-06-07 PROBLEM — T14.8XXA OPEN WOUND: Status: ACTIVE | Noted: 2021-01-01

## 2021-06-23 NOTE — PATIENT PROFILE ADULT - NSPROEDAABILITYLEARN_GEN_A_NUR
Left detailed message with my direct number for patient to return the call to review results and recommendations.    language

## 2021-07-26 NOTE — ED PROVIDER NOTE - PMH
BPH (benign prostatic hyperplasia)    Congestive heart failure    Coronary artery disease  s/p PCI  CVA (cerebrovascular accident)  Over 10 years ago  Dementia    Depression    History of gastritis    HTN (hypertension)    Pacemaker

## 2021-07-26 NOTE — ED ADULT NURSE NOTE - NSIMPLEMENTINTERV_GEN_ALL_ED
Implemented All Fall with Harm Risk Interventions:  [place] to call system. Call bell, personal items and telephone within reach. Instruct patient to call for assistance. Room bathroom lighting operational. Non-slip footwear when patient is off stretcher. Physically safe environment: no spills, clutter or unnecessary equipment. Stretcher in lowest position, wheels locked, appropriate side rails in place. Provide visual cue, wrist band, yellow gown, etc. Monitor gait and stability. Monitor for mental status changes and reorient to person, place, and time. Review medications for side effects contributing to fall risk. Reinforce activity limits and safety measures with patient and family. Provide visual clues: red socks.

## 2021-07-26 NOTE — ED PROVIDER NOTE - CARE PROVIDER_API CALL
Tj Sosa)  Urology  [address], [address] [place] [address]  Phone: [phone]  Fax: [phone]  Follow Up Time: 1-3 Days

## 2021-07-26 NOTE — ED PROVIDER NOTE - CLINICAL SUMMARY MEDICAL DECISION MAKING FREE TEXT BOX
Pt w/ hx of bladder cancer c/o decreased urination since yesterday evening only dribbling. Mercedes placed by RN after ultrasound by PA. Plan is Mercedes cath, labs and CT.

## 2021-07-26 NOTE — ED PROVIDER NOTE - NSFOLLOWUPINSTRUCTIONS_ED_ALL_ED_FT
Please follow up with your Primary MD in 1-2 days and urology as soon as possible. Return to ED immediately for any new or concerning symptoms or worsening symptoms    URINARY RETENTION IN MEN - AfterCare(R) Instructions(ER/ED)           Urinary Retention in Men    WHAT YOU NEED TO KNOW:    Urinary retention is a condition that develops when your bladder does not empty completely when you urinate.     Male Reproductive System         DISCHARGE INSTRUCTIONS:    Medicines:   •Medicines can help decrease the size of your prostate, fight infection, and help you urinate more easily.      •Take your medicine as directed. Contact your healthcare provider if you think your medicine is not helping or if you have side effects. Tell him or her if you are allergic to any medicine. Keep a list of the medicines, vitamins, and herbs you take. Include the amounts, and when and why you take them. Bring the list or the pill bottles to follow-up visits. Carry your medicine list with you in case of an emergency.      Mercedes catheter care: You may need a Mercedes catheter for up to 2 weeks at home. Healthcare providers will give you a smaller leg bag to collect urine. Keep the bag below your waist. This will prevent urine from flowing back into your bladder and causing an infection or other problems. Also, keep the tube free of kinks so the urine will drain properly. Do not pull on the catheter. This can cause pain and bleeding, and may cause the catheter to come out. Ask your healthcare provider or urologist for more information on Mercedes catheter care.    Urinate regularly: When your catheter is removed, do not let your bladder become too full before you urinate. Set regular times each day to urinate. Urinate as soon as you feel the need or at least every 3 hours while you are awake. Do not drink liquids before you go to bed. Urinate right before you go to bed.    Follow up with your healthcare provider or urologist as directed: Write down your questions so you remember to ask them during your visits.     Contact your healthcare provider or urologist if:   •You have a fever.      •You have pain when you urinate.      •You have blood in your urine.      •You have problems with your catheter.      •You have questions or concerns about your condition or care.      Return to the emergency department if:   •You have severe abdominal pain.      •You are breathing faster than usual.      •Your heartbeat is faster than usual.      •Your face, hands, feet, or ankles are swollen.

## 2021-07-26 NOTE — ED ADULT NURSE NOTE - OBJECTIVE STATEMENT
pt p/w c/o urinary retention/urgency x 3 days.  The patient is Uzbek speaking, son present at bedside to translate.  Pt c/o minor abdominal distention and inability to have an adequate urine output.

## 2021-07-26 NOTE — ED PROVIDER NOTE - OBJECTIVE STATEMENT
89 y/o M with a PMHx of CHF, dementia, depression, gastritis, BPH, HTN, CVA, CAD, pacemaker presents to the ED BIBEMS from home c/o fatigue, dizziness, and urinary retention. Pt son at bedside reports he has been having urinary retention for few days .Denies fever and SOB. Pt lives alone but has a daytime aide until 8pm. Pt had bladder cancer x10 years ago. Pt is Kosovan speaking, son at bedside translating. PCP: Dr. Rubi.

## 2021-07-26 NOTE — ED PROVIDER NOTE - PROGRESS NOTE DETAILS
89 y/o M presents with difficulty urinating. Pt sons reports difficulty urinating since yesterday, has only been trickling urine today. No other complaints at this time. -Mani Bernardo PA-C Results reviewed and discussed with family, will dc with leg bag. Discussed importance of close FU with urology. Pt asked to return to ED immediately for any new or concerning sx or worsening. Pt acknowledges and understands plan -Mani Bernardo PA-C

## 2021-07-26 NOTE — ED PROVIDER NOTE - PATIENT PORTAL LINK FT
You can access the FollowMyHealth Patient Portal offered by [facility] by registering at the following website: [email or web address]. By joining [employer]’s FollowMyHealth portal, you will also be able to view your health information using other applications (apps) compatible with our system.

## 2021-07-30 PROBLEM — N40.0 BPH (BENIGN PROSTATIC HYPERPLASIA): Noted: 2020-01-01

## 2021-07-30 NOTE — HISTORY OF PRESENT ILLNESS
[FreeTextEntry1] : 89 yo male presents for Urinary retention. \par Went to [facility] ED 4 days with urinary retention, indwelling Mercedes catheter placed. \par Has history of Benign Prostatic Hyperplasia on Flomax and Finasteride. \par Urinated 4-5 x during the day and nocturia 2-3 x with slow flow, sits to urinate. \par Has history of Bladder cancer. Followed with Dr Lane.

## 2021-07-30 NOTE — PHYSICAL EXAM
[Normal Appearance] : normal appearance [General Appearance - In No Acute Distress] : no acute distress [] : no respiratory distress [Abdomen Soft] : soft [Abdomen Tenderness] : non-tender [Costovertebral Angle Tenderness] : no ~M costovertebral angle tenderness [FreeTextEntry1] : Mercedes to leg bag draining clear urine  [Skin Color & Pigmentation] : normal skin color and pigmentation [Oriented To Time, Place, And Person] : oriented to person, place, and time

## 2021-07-30 NOTE — ASSESSMENT
[FreeTextEntry1] : Reviewed outside records. \par Urine culture negative. \par \par Benign Prostatic Hyperplasia:\par Urinary retention:\par Continue Flomax and Finasteride. \par \par Trial of void next week.

## 2021-08-04 NOTE — ED PROVIDER NOTE - CARE PROVIDER_API CALL
Tj Sosa)  Urology  [address], [address] [place] [address]  Phone: [phone]  Fax: [phone]  Follow Up Time:

## 2021-08-04 NOTE — ED PROVIDER NOTE - OBJECTIVE STATEMENT
89 y/o male in ED c/o urinary retention x 1 day.   family staes pt seen in ED last week for same and had oden placed.   states seen by urology on 8/2/21 and had oden removed.  states had scant urine initially but no urine since last night.   pt denies any fever, HA, cp, sob, n/v/d/abd pain.   tolerating PO.

## 2021-08-04 NOTE — ED PROVIDER NOTE - PATIENT PORTAL LINK FT
You can access the FollowMyHealth Patient Portal offered by [facility] by registering at the following website: [email or web address]. By joining [employer]’s FollowMyHealth portal, you will also be able to view your health information using other applications (apps) compatible with our system.

## 2021-08-04 NOTE — ED ADULT NURSE NOTE - HIV OFFER
Opt out 8h16oad old healthy vaccinated M with few hours of refusal to bear weight on L leg, no known injuries.  No fever or recent illness. Pt well appearing, soft abdomen, normal  exam. Crying with internal rotation of hip (but difficult exam 2/2 cooperation), no other hesitation with knee or ankle movments, reflexes 2+, no point tenderness or bruising of leg.  DDx fracture, bruising, toxic synovitis.  Xr, U/S hip, motrin, reassess. -Aida Sousa MD

## 2021-08-04 NOTE — ED ADULT NURSE NOTE - OBJECTIVE STATEMENT
89 y/o male comes into the ER with c/o of urinary retention. Pt daughter states that her father is unable t0 urinate x 1 day. pt has history of retention, catheter was removed on Monday. placed f/c in ED 16f, pt put out 250ml or urine. collected urine at bedside.

## 2021-08-04 NOTE — ED PROVIDER NOTE - NSFOLLOWUPINSTRUCTIONS_ED_ALL_ED_FT
please follow up with your urologist in 2-3 days.   keep oden in place until you follow up.   return to ED for any concerns

## 2021-08-04 NOTE — ED CLERICAL - NS ED CLERK NOTE PRE-ARRIVAL INFORMATION; ADDITIONAL PRE-ARRIVAL INFORMATION

## 2021-08-07 NOTE — ED POST DISCHARGE NOTE - RESULT SUMMARY
+UC Contacted daughter Maria Antonia his HCP and reported results of UC. Agreedt to Rx. Macrobid and F/U with Dr. Sosa as scheduled. Helen NP

## 2021-08-07 NOTE — ED POST DISCHARGE NOTE - OTHER COMMUNICATION
Contacted Dr. Sutton covering for patient's urologist Dr. Sosa and discussed results and elevated creatinine also that patient is currently taking Coumadin. Agreed to Rx. Macrobid. MTangredi NP

## 2021-08-12 PROBLEM — G47.00 INSOMNIA: Status: ACTIVE | Noted: 2021-01-01

## 2021-08-13 PROBLEM — N39.0 ACUTE UTI: Status: ACTIVE | Noted: 2021-01-01

## 2021-08-18 PROBLEM — I50.9 CHF (CONGESTIVE HEART FAILURE): Status: ACTIVE | Noted: 2020-01-01

## 2021-08-18 PROBLEM — I48.91 AFIB: Status: ACTIVE | Noted: 2020-01-01

## 2021-08-18 PROBLEM — F33.9 CHRONIC RECURRENT MAJOR DEPRESSIVE DISORDER: Status: ACTIVE | Noted: 2020-01-01

## 2021-08-18 PROBLEM — I70.0 AORTIC CALCIFICATION: Status: ACTIVE | Noted: 2020-01-01

## 2021-08-18 PROBLEM — J47.9 BRONCHIECTASIS WITHOUT COMPLICATION: Status: ACTIVE | Noted: 2020-01-01

## 2021-08-18 PROBLEM — N18.4 CKD (CHRONIC KIDNEY DISEASE), STAGE IV: Status: ACTIVE | Noted: 2020-01-01

## 2021-08-18 NOTE — CURRENT MEDS
[Medication and Allergies Reconciled] : medication and allergies reconciled [High Risk Medications Reviewed and Reconciled (Beers Criteria)] : high risk medications reviewed and reconciled [Reviewed patient reported medication adherence from Comprehensive Assessment] : Reviewed patient reported medication adherence from comprehensive assessment [Non adherent to medications] : Patient is non adherent to medications as prescribed [de-identified] : reluctant to take  diuretics   educated about  risk and benefits

## 2021-08-18 NOTE — COUNSELING
[Normal Weight - ( BMI  <25 )] : normal weight - ( BMI  <25 ) [Weight counseling provided] : weight counseling provided [TLC diet recommended] : TLC diet recommended [DASH diet recommended] : DASH diet recommended [Medical/Nutritional supplementation as prescribed] : medical/nutritional supplementation as prescribed [Non - Smoker] : non-smoker [Use grab bars] : use grab bars [Medical alert] : medical alert [Use assistive device to avoid falls] : use assistive device to avoid falls [Remove clutter and unsafe carpeting to avoid falls] : remove clutter and unsafe carpeting to avoid falls [___] : [unfilled] [] : foot exam [Completed] : Aspirin use discussion completed [Decrease stress] : decrease stress [Decrease hospital use] : decrease hospital use [Minimize unnecessary interventions] : minimize unnecessary interventions [Maintain functional ability] : maintain functional ability [Likely to achieve goals/desired outcomes] : likely to achieve goals/desired outcomes [Discussed disease trajectory with patient/caregiver] : discussed disease trajectory with patient/caregiver [Patient/Caregiver has ___ understanding of disease process] : patient/caregiver has [unfilled] understanding of disease process [Advanced Directives discussed: ____] : Advanced directives discussed: [unfilled] [Full Code] : Code Status: Full Code [No Limitations] : Treatment Guidelines: No limitations [Long Term Intubation] : Intubation: Long term intubation [de-identified] : discusssed but not  completed

## 2021-08-18 NOTE — REASON FOR VISIT
[Family Member] : family member [Pre-Visit Preparation] : pre-visit preparation was done [Acute] : an acute visit [Intercurrent Specialty/Sub-specialty Visits] : the patient has no intercurrent specialty/sub-specialty visits

## 2021-08-18 NOTE — PHYSICAL EXAM
[Normal Sclera/Conjunctiva] : normal sclera/conjunctiva [No JVD] : no jugular venous distention [Normal Rate] : heart rate was normal  [Regular Rhythm] : with a regular rhythm [No Motor Deficits] : the motor exam was normal [No Gross Sensory Deficits] : no gross sensory deficits [Oriented x3] : oriented to person, place, and time [Normal Affect] : the affect was normal [de-identified] : elderly  male cooperative   looking younger than stated age  [de-identified] : crackles at bases  but good air entry  [de-identified] : 1   + edema   improved for last visit  [de-identified] : antalgic  unsteady  decreased  strength and tone  [de-identified] : senile  changes

## 2021-08-18 NOTE — HISTORY OF PRESENT ILLNESS
[Patient] : patient [Family Member] : family member [FreeTextEntry1] : CHF gait instability  [FreeTextEntry2] : \par Patient denies fever, cough, trouble breathing, rash, vomiting and diarrhea. Patient has not been in close contact with someone Covid positive.\par N95 mask ,gloves and eyewear used during visit  Total  face to  face time  is  30 minutes \par 89 y/o M w PMH of , CHF w EF of 65% (May 2019), HTN, CAD s/p PCI, \par s/p pacemaker, Afib on Warfarin, BPH, depression  seen  today for acute visit  \par  interval events reviewed   and addressed \par  patient   biggest complaint is   Oden catheter  following with  urologist  \par  currently on ABX for   UTI  Started on Nitrofurantoin. \par  trial of void  unsuccessful Oden reinserted  at [facility] ED. \par  Started on Nitrofurantoin. \par \par 89 yo male presents for Urinary retention. \par Went to [facility] ED 4 days with urinary retention, indwelling Oden catheter placed. \par Has history of Benign Prostatic Hyperplasia on Flomax and Finasteride. \par Urinated 4-5 x during the day and nocturia 2-3 x with slow flow, sits to urinate. \par Has history of Bladder cancer. Followed with Dr Lane. \par  \par has been taking warfarin 3 and 2  alt  lasix 80 and 40   alt and  now trazodone  \par  sleep still poor  \par patient is homebound due  CHF \par  most of the history obtained from family member daughter Maria Antonia \par   today patient denies any chest  pains  palpitations     shortness  of breath at rest  but is short of breath  on minimal exertion   explained to daughter it can be sign of angina  and need for  cardiac  w/u daughter  declines  \par  patient  still follows with   specialists  nephrologist   who monitors  CKD\par patient had been stable  on current dose of  lasix and metolazone \par diet regular  appetite   decent  \par dysphagia none \par Weight stable now  \par constipation uses  miralax \par incontinence  now with oden \par pressure/bed sores none \par Ambulates with  some assistance \par falls none recently \par Behavior good  can get frustrated  occasionally  and  on antidepressant   daughter does not want to  discontinue  \par Mood good  \par  memory  good \par  sleep  OK \par sensory deficits  wears glasses  \par pain denies currently \par Medication refills done and reconciliation  done \par Goals of care discussed  daughter  wants full resuscitation \par

## 2021-08-20 PROBLEM — R33.9 URINARY RETENTION: Status: ACTIVE | Noted: 2021-01-01

## 2021-08-20 PROBLEM — N40.1 BPH WITH OBSTRUCTION/LOWER URINARY TRACT SYMPTOMS: Status: ACTIVE | Noted: 2021-01-01

## 2021-08-25 NOTE — HISTORY OF PRESENT ILLNESS
[FreeTextEntry1] : 89 yo male presents for follow up. \par Next day after trial of void was at [facility] ED. \par Mercedes reinserted, Started on Nitrofurantoin. \par \par Initially seen for Urinary retention. \par Went to [facility] ED with urinary retention, indwelling Mercedes catheter placed. \par Has history of Benign Prostatic Hyperplasia on Flomax and Finasteride. \par Urinated 4-5 x during the day and nocturia 2-3 x with slow flow, sits to urinate. \par Has history of Bladder cancer. Followed with Dr Lane.

## 2021-08-25 NOTE — ASSESSMENT
[FreeTextEntry1] : Benign Prostatic Hyperplasia:\par Urinary retention:\par Discussed treatment options. \par Continue Flomax and Finasteride. \par Will start Bethanechol. \par Nitrofurantoin restart 1 day before follow up appointment. \par \par Return to office in 1 week or sooner if any issues: trial of void

## 2021-09-07 NOTE — ED PROVIDER NOTE - CARDIAC, MLM
Regular rate, regular rhythm. Normal radial pulse. Regular rate, regular rhythm. Normal radial pulse. Has a pacemaker.

## 2021-09-07 NOTE — ED PROVIDER NOTE - ATTENDING CONTRIBUTION TO CARE
Dr. Olmos: I have personally performed a face to face bedside history and physical examination of this patient. I have discussed the history, examination, review of systems, assessment and plan of management with the resident. I have reviewed the electronic medical record and amended it to reflect my history, review of systems, physical exam, assessment and plan.

## 2021-09-07 NOTE — ED PROVIDER NOTE - PROGRESS NOTE DETAILS
Joanne WEEKS for attending Dr. Olmos: 89 y/o M male CHF, dementia, depression, gastritis, BPH, HTN, CVA, CAD, pacemaker presents to ED [facility] from [facility] for moderate SOB. Pt states he has difficulty breathing; worse on exertion. Denies fever, cough or CP. Per son, this is how his CHF usually presents. Patient is very hard of hearing, speaks Nepali/Czech, some english, and is difficult to communicate with.

## 2021-09-07 NOTE — H&P ADULT - ASSESSMENT
89 y/o M w/ PMH of dementia, depression, gastritis, diastolic CHF, BPH, HTN, CVA, CAD s/p PCI, PPM, a-fib (on coumadin), chronic oden, p/w dyspnea.    *Dyspnea 2/2 Acute on chronic diastolic CHF vs r/o PNA   -S/p Lasix 80 IV in ED. Will start lasix 40 IV daily   -I/Os  -BB + ACEi  -Cardio consult   -I/Os + Daily weights   -Echo 10/20 - EF 55-60%, moderate pulm HTN, valvular abnormalities   -CT chest to evaluate for PNA  -Ceftriaxone / zithromax  -Lactate = 1.1  -F/u blood cx   -F/u COVID19 / RVP   -Daughter unsure if patient takes metolazone, will await Dr. Washington's recommendations     *Mildly elevated troponin w/ h/o CAD  - Possibly 2/2 CHF?  -Troponin was normal during previous admission even with CKD. Will contiue to trend troponins  -Cardio consult  -ASA  -Echo  -Tele    *UTI  -Ceftriaxone  -F/u urine cx     *Possible CKD Stage IV?  -Trend creatinine in AM   -Avoid nephrotoxic agents  -I/Os     *Anemia  -Trend H/H and if stable -> F/u outpatient for further work up     *A-fib   -On coumadin  -Will order INR    *H/o dementia / depression / gastritis / BPH / HTN / CVA / PPM   -C/w home meds (except as noted above) and if conditions remain stable, f/u outpatient for further management     *Placement  -Daughter states patient lives by himself, and would like for patient to go to rehab  -PT consult   -Social work consult     *DVT ppx  -On coumadin    89 y/o M w/ PMH of dementia, depression, gastritis, diastolic CHF, BPH, HTN, CVA, CAD s/p PCI, PPM, a-fib (on coumadin), chronic oden, p/w dyspnea.    *Dyspnea 2/2 Acute on chronic diastolic CHF vs r/o PNA   -S/p Lasix 80 IV in ED. Will start lasix 40 IV daily   -I/Os  -On BB, will place holding parameters as patient had HR of 59  -Not on ACEi, will defer to cardio given renal failure   -Cardio consult   -I/Os + Daily weights   -Echo 10/20 - EF 55-60%, moderate pulm HTN, valvular abnormalities   -CT chest to evaluate for PNA  -Ceftriaxone / zithromax  -Lactate = 1.1  -F/u blood cx   -F/u COVID19 / RVP   -Daughter unsure if patient takes metolazone, will await Dr. Washington's recommendations     *Mildly elevated troponin w/ h/o CAD  - Possibly 2/2 CHF?  -Troponin was normal during previous admission even with CKD. Will contiue to trend troponins  -Cardio consult  -ASA  -Echo  -Tele    *UTI  -Ceftriaxone  -F/u urine cx     *Possible CKD Stage IV?  -Trend creatinine in AM   -Avoid nephrotoxic agents  -I/Os     *Anemia  -Trend H/H and if stable -> F/u outpatient for further work up     *A-fib   -On coumadin  -Will order INR    *H/o dementia / depression / gastritis / BPH / HTN / CVA / PPM   -C/w home meds (except as noted above) and if conditions remain stable, f/u outpatient for further management     *Placement  -Daughter states patient lives by himself, and would like for patient to go to rehab  -PT consult   -Social work consult     *DVT ppx  -On coumadin

## 2021-09-07 NOTE — H&P ADULT - HISTORY OF PRESENT ILLNESS
89 y/o M w/ PMH of dementia, depression, gastritis, diastolic CHF, BPH, HTN, CVA, CAD s/p PCI, PPM, a-fib (on coumadin), chronic oden, p/w dyspnea. Patient states he has difficulty breathing, but patient able to speak in full sentences. Due to hard of hearing and masks, had difficulty getting detailed history from patient. Called daughter Maria Antonia Berger. States that patient has been having SOB for awhile, but it got worse recently. States he coughs and produces a lot of mucus, which he sometimes has trouble getting up. Patient had hoarseness at bedside during my inteview, patient's daughter states that hoarseness has been developing for awhile with age, and is not acute. Denies h/o COPD. Denies fever, chills, nausea, vomiting, abdominal pain, CP, urinary complaints     PSH: Appendectomy    Social Hx: Denies x 3    Family Hx: Denies

## 2021-09-07 NOTE — ED ADULT TRIAGE NOTE - CHIEF COMPLAINT QUOTE
patient BIBA from [facility] for SOB with yellow sputum x2days as per 24/7 aid at home. EMS reports O2 sat 92% on room air, NRB applied, O2 faiza to 94%. pt's O2 sat in triage 100% on room air

## 2021-09-07 NOTE — ED CLERICAL - NS ED CLERK NOTE PRE-ARRIVAL INFORMATION; ADDITIONAL PRE-ARRIVAL INFORMATION

## 2021-09-07 NOTE — ED PROVIDER NOTE - PHYSICAL EXAMINATION
Physical Exam:  Gen: NAD, non-toxic appearing  Head: NCAT  HEENT: EOMI, PEERLA, normal conjunctiva, tongue midline, oral mucosa moist  Lung: dips to low 90's intermittently, satting well on 2L NC, no respiratory distress, no wheezes/rhonchi/rales B/L  CV: RRR, no murmurs, rubs or gallops, distal pulses 2+ b/l  Abd: soft, NT, ND, no guarding, no rigidity, no rebound tenderness, no CVA tenderness   Skin: Warm, well perfused, no rash  Psych: normal affect, calm

## 2021-09-07 NOTE — ED PROVIDER NOTE - OBJECTIVE STATEMENT
89yo male CHF, dementia, depression, gastritis, BPH, HTN, CVA, CAD, pacemaker p/w difficulty breathing worse on exertion, cough x 2 days. Per son, this is how his CHF usually presents. Patient very hard of hearing, speaks Vietnamese/Slovak, some english, difficulty to communicate with.

## 2021-09-07 NOTE — ED PROVIDER NOTE - CLINICAL SUMMARY MEDICAL DECISION MAKING FREE TEXT BOX
89 yo male CHF p/w dyspnea, cough similar to prior CHf exacerbations. Low 90's on RA intermittently, satting well on 2L NC. r/o CHF exacerbation vs PNA. Labs, lasix PRN, CXr, and possible admit. 89 yo male CHF p/w dyspnea, cough similar to prior CHf exacerbations. Low 90's on RA intermittently, satting well on 2L NC. r/o CHF exacerbation vs PNA. Labs, lasix PRN, CXr, oden change and possible admit.

## 2021-09-07 NOTE — H&P ADULT - NSHPOUTPATIENTPROVIDERS_GEN_ALL_CORE
PCP: Dr. Roxy Sandoval  Cardio: Dr. Washington   Nephro: Dr. Dominic MURPHY: Part of [facility]

## 2021-09-07 NOTE — ED ADULT NURSE NOTE - NSIMPLEMENTINTERV_GEN_ALL_ED
Implemented All Fall with Harm Risk Interventions:  [place] to call system. Call bell, personal items and telephone within reach. Instruct patient to call for assistance. Room bathroom lighting operational. Non-slip footwear when patient is off stretcher. Physically safe environment: no spills, clutter or unnecessary equipment. Stretcher in lowest position, wheels locked, appropriate side rails in place. Provide visual cue, wrist band, yellow gown, etc. Monitor gait and stability. Monitor for mental status changes and reorient to person, place, and time. Review medications for side effects contributing to fall risk. Reinforce activity limits and safety measures with patient and family. Provide visual clues: red socks.

## 2021-09-07 NOTE — ED ADULT NURSE NOTE - OBJECTIVE STATEMENT
Pt BIBA from [facility] with c/o SOB and stating "I just don't feel well".  Pt afebrile in ED, Sp02 92% RA, currently 100% NC2L in place.  Pt does not appear to be in respiratory distress, stating "the SOB is worse when Im walking".  VSS, no c/o edema noted.  Will continue to monitor

## 2021-09-07 NOTE — ED ADULT NURSE NOTE - CHIEF COMPLAINT QUOTE
patient BIBA from [facility] for SOB with yellow sputum x2days as per 24/7 aid at home. EMS reports O2 sat 92% on room air, NRB applied, O2 faiza to 94%. pt's O2 sat in triage 100% on room air

## 2021-09-07 NOTE — H&P ADULT - CONVERSATION DETAILS
Spoke to patient's daughter Maria Antonia Steiner, she states she is HCP for patient. Discussed code status, states family will discuss and make decision later. Advance care planning <30 mins

## 2021-09-08 NOTE — CONSULT NOTE ADULT - SUBJECTIVE AND OBJECTIVE BOX
Patient is a 89y old  Male who presents with a chief complaint of dyspnea (07 Sep 2021 20:48)      HPI:  89 y/o M w/ PMH of dementia, depression, gastritis, diastolic CHF, BPH, HTN, CVA, CAD s/p PCI, PPM, a-fib (on coumadin), chronic oden, p/w dyspnea. Patient states he has difficulty breathing, but patient able to speak in full sentences. Due to hard of hearing and masks, had difficulty getting detailed history from patient. Called daughter Maria Antonia Berger. States that patient has been having SOB for awhile, but it got worse recently. States he coughs and produces a lot of mucus, which he sometimes has trouble getting up. Patient had hoarseness at bedside during my inteview, patient's daughter states that hoarseness has been developing for awhile with age, and is not acute. Denies h/o COPD. Denies fever, chills, nausea, vomiting, abdominal pain, CP, urinary complaints   He aslo h/o chronic back pain , prior CVA , hyperlipedemia,PPM and chronic diastolic CHF , CT scan shows possible PNA , he' scurrently lying flat without CP or SOB   PSH: Appendectomy    Social Hx: Denies x 3    Family Hx: Denies  (07 Sep 2021 20:48)      PAST MEDICAL & SURGICAL HISTORY:  Pacemaker    Coronary artery disease  s/p PCI    HTN (hypertension)    BPH (benign prostatic hyperplasia)    CVA (cerebrovascular accident)  Over 10 years ago    History of gastritis    Depression    Dementia    Congestive heart failure    History of appendectomy  Over 50 years ago    S/P coronary artery stent placement                                      MEDICATIONS  (STANDING):  atorvastatin 40 milliGRAM(s) Oral at bedtime  azithromycin  IVPB      azithromycin  IVPB 500 milliGRAM(s) IV Intermittent every 24 hours  cefTRIAXone Injectable. 1000 milliGRAM(s) IV Push every 24 hours  finasteride 5 milliGRAM(s) Oral daily  furosemide   Injectable 40 milliGRAM(s) IV Push daily  metoprolol succinate ER 12.5 milliGRAM(s) Oral daily  potassium chloride    Tablet ER 10 milliEquivalent(s) Oral daily  sertraline 25 milliGRAM(s) Oral daily  tamsulosin 0.4 milliGRAM(s) Oral at bedtime  timolol 0.5% Solution 1 Drop(s) Both EYES daily  traZODone 25 milliGRAM(s) Oral at bedtime    MEDICATIONS  (PRN):      FAMILY HISTORY:  Patient unable to provide medical history  patient does not known parents history        SOCIAL HISTORY: former smoker    CIGARETTES:        Vital Signs Last 24 Hrs  T(C): 36.7 (08 Sep 2021 08:03), Max: 36.7 (07 Sep 2021 17:29)  T(F): 98.1 (08 Sep 2021 08:03), Max: 98.1 (08 Sep 2021 07:33)  HR: 60 (08 Sep 2021 08:03) (59 - 92)  BP: 122/55 (08 Sep 2021 08:03) (111/74 - 180/125)  BP(mean): 142 (08 Sep 2021 07:33) (72 - 142)  RR: 16 (08 Sep 2021 08:03) (15 - 20)  SpO2: 99% (08 Sep 2021 08:03) (94% - 100%)            INTERPRETATION OF TELEMETRY:    ECG:    I&O's Detail    07 Sep 2021 07:01  -  08 Sep 2021 07:00  --------------------------------------------------------  IN:  Total IN: 0 mL    OUT:    Indwelling Catheter - Urethral (mL): 1800 mL  Total OUT: 1800 mL    Total NET: -1800 mL          LABS:                        8.3    6.17  )-----------( 329      ( 08 Sep 2021 06:55 )             25.3     09-08    139  |  108  |  74<H>  ----------------------------<  85  4.0   |  25  |  2.15<H>    Ca    8.7      08 Sep 2021 06:55    TPro  7.2  /  Alb  2.7<L>  /  TBili  0.4  /  DBili  x   /  AST  19  /  ALT  17  /  AlkPhos  82  09-08    CARDIAC MARKERS ( 08 Sep 2021 00:56 )  0.040 ng/mL / x     / x     / x     / x      CARDIAC MARKERS ( 07 Sep 2021 22:33 )  0.041 ng/mL / x     / x     / x     / x      CARDIAC MARKERS ( 07 Sep 2021 17:26 )  0.046 ng/mL / x     / x     / x     / x          PT/INR - ( 08 Sep 2021 06:55 )   PT: 30.7 sec;   INR: 2.75 ratio         PTT - ( 08 Sep 2021 06:55 )  PTT:40.3 sec  Urinalysis Basic - ( 07 Sep 2021 17:30 )    Color: Yellow / Appearance: Clear / S.015 / pH: x  Gluc: x / Ketone: Negative  / Bili: Negative / Urobili: Negative mg/dL   Blood: x / Protein: 30 mg/dL / Nitrite: Negative   Leuk Esterase: Moderate / RBC: 3-5 /HPF / WBC 11-25   Sq Epi: x / Non Sq Epi: Moderate / Bacteria: Moderate      I&O's Summary    07 Sep 2021 07:01  -  08 Sep 2021 07:00  --------------------------------------------------------  IN: 0 mL / OUT: 1800 mL / NET: -1800 mL      BNPSerum Pro-Brain Natriuretic Peptide: 6995 pg/mL ( @ 17:26)    RADIOLOGY & ADDITIONAL STUDIES:

## 2021-09-08 NOTE — PHYSICAL THERAPY INITIAL EVALUATION ADULT - SITTING BALANCE: STATIC
March 21, 2018      Va Ruvalcaba  [address] [facility] [place] [id number]        Dear Va,      The results of your upper endoscopy (EGD) and colonoscopy performed on 3/14/18 have returned and indicate the following:    EGD Results:  Portal Hypertensive Gastropathy  No esophageal or gastric varices      Colon Results:  2 Tubular Adenoma Polyp(s)      INFORMATION:  · Tubular Adenoma Polyp(s)  Tubular adenomas are grape-like growths of tissue in the colon that can be pre-cancerous.  If these polyps are not removed, over time they can cause colon cancer.  Although your polyp was removed during the procedure, these types of polyps can recur or other polyps may develop.    It is important that you be re-screened to remove any future polyps.  This is necessary to monitor for further development of polyps that may be pre-cancerous in nature.  Colonoscopies remain the best examination for follow-up with patients who have had polyps removed.       I am recommending a follow-up upper endoscopy (EGD) in 2 years and colonoscopy in 5 years.         It has been a pleasure caring for you.  If you have any questions, please feel free to contact my office at [phone].    Sincerely,          Ya Barnes MD  Gastroenterology  [address], [address]  [place] [address]  
fair balance

## 2021-09-08 NOTE — PROGRESS NOTE ADULT - ASSESSMENT
MEDICATIONS  (STANDING):  atorvastatin 40 milliGRAM(s) Oral at bedtime  azithromycin  IVPB      azithromycin  IVPB 500 milliGRAM(s) IV Intermittent every 24 hours  cefTRIAXone Injectable. 1000 milliGRAM(s) IV Push every 24 hours  finasteride 5 milliGRAM(s) Oral daily  furosemide   Injectable 40 milliGRAM(s) IV Push daily  metoprolol succinate ER 12.5 milliGRAM(s) Oral daily  potassium chloride    Tablet ER 10 milliEquivalent(s) Oral daily  sertraline 25 milliGRAM(s) Oral daily  tamsulosin 0.4 milliGRAM(s) Oral at bedtime  timolol 0.5% Solution 1 Drop(s) Both EYES daily  traZODone 25 milliGRAM(s) Oral at bedtime    MEDICATIONS  (PRN):        89 y/o M w/ PMH of dementia, depression, gastritis, diastolic CHF, BPH, HTN, CVA, CAD s/p PCI, PPM, a-fib (on coumadin), chronic oden, p/w dyspnea.    *Dyspnea 2/2 Acute on chronic diastolic CHF vs r/o PNA   -S/p Lasix 80 IV in ED. Will start lasix 40 IV daily   -I/Os  -On BB, will place holding parameters as patient had HR of 59  -Not on ACEi, will defer to cardio given renal failure   -Cardio consult   -I/Os + Daily weights   -Echo 10/20 - EF 55-60%, moderate pulm HTN, valvular abnormalities   -CT chest to evaluate for PNA  -Ceftriaxone / zithromax  -Lactate = 1.1  -F/u blood cx   -F/u COVID19 / RVP   -Daughter unsure if patient takes metolazone, will await Dr. Washington's recommendations     *Mildly elevated troponin w/ h/o CAD  - Possibly 2/2 CHF?  -Troponin was normal during previous admission even with CKD. Will contiue to trend troponins  -Cardio consult  -ASA  -Echo  -Tele    *UTI  -Ceftriaxone  -F/u urine cx     *JACQUELIN on CKD Stage IV?  -Trend creatinine in AM   -Avoid nephrotoxic agents  -I/Os   -Consulte nephrology    *Anemia  -Trend H/H and if stable -> F/u outpatient for further work up   -No overt signs of bleeding. around baseline.     *A-fib   -On coumadin  -Follow INR and order coumadin accordingly     *H/o dementia / depression / gastritis / BPH / HTN / CVA / PPM   -C/w home meds (except as noted above) and if conditions remain stable, f/u outpatient for further management     *Placement  -Daughter states patient lives by himself, and would like for patient to go to rehab  -PT consult   -Social work consult     *DVT ppx  -On coumadin  MEDICATIONS  (STANDING):  atorvastatin 40 milliGRAM(s) Oral at bedtime  azithromycin  IVPB      azithromycin  IVPB 500 milliGRAM(s) IV Intermittent every 24 hours  cefTRIAXone Injectable. 1000 milliGRAM(s) IV Push every 24 hours  finasteride 5 milliGRAM(s) Oral daily  furosemide   Injectable 40 milliGRAM(s) IV Push daily  metoprolol succinate ER 12.5 milliGRAM(s) Oral daily  potassium chloride    Tablet ER 10 milliEquivalent(s) Oral daily  sertraline 25 milliGRAM(s) Oral daily  tamsulosin 0.4 milliGRAM(s) Oral at bedtime  timolol 0.5% Solution 1 Drop(s) Both EYES daily  traZODone 25 milliGRAM(s) Oral at bedtime    MEDICATIONS  (PRN):        89 y/o M w/ PMH of dementia, depression, gastritis, diastolic CHF, BPH, HTN, CVA, CAD s/p PCI, PPM, a-fib (on coumadin), chronic oden, p/w dyspnea.    *Dyspnea 2/2 Acute on chronic diastolic CHF vs r/o PNA   -S/p Lasix 80 IV in ED. Will start lasix 40 IV daily   -I/Os  -On BB, will place holding parameters as patient had HR of 59  -Not on ACEi, will defer to cardio/nephro given renal failure   -Cardio/Nephrology consult   -I/Os + Daily weights   -Echo 10/20 - EF 55-60%, moderate pulm HTN, valvular abnormalities   -CT chest to evaluate for PNA  -Ceftriaxone / zithromax  -Lactate = 1.1  -F/u blood cx   -COVID-19 PCR negative    *Mildly elevated troponin w/ h/o CAD  - Possibly 2/2 CHF?  -Troponin was normal during previous admission even with CKD. Will contiue to trend troponins  -Cardio consult  -ASA  -Echo  -Tele    *UTI  -Ceftriaxone  -F/u urine cx     *JACQUELIN on CKD Stage IV?  -Trend creatinine in AM   -Avoid nephrotoxic agents  -I/Os   -Consulte nephrology    *Anemia  -Trend H/H and if stable -> F/u outpatient for further work up   -No overt signs of bleeding. around baseline.     *A-fib   -On coumadin  -Follow INR and order coumadin accordingly     *H/o dementia / depression / gastritis / BPH / HTN / CVA / PPM   -C/w home meds (except as noted above) and if conditions remain stable, f/u outpatient for further management     *Placement  -Daughter states patient lives by himself, and would like for patient to go to rehab  -PT consult   -Social work consult     *DVT ppx  -On coumadin

## 2021-09-08 NOTE — PHYSICAL THERAPY INITIAL EVALUATION ADULT - PERTINENT HX OF CURRENT PROBLEM, REHAB EVAL
Patient is a 89y old  Male who presents with a chief complaint of dyspnea, Patient states he has difficulty breathing, but patient able to speak in full sentences. Due to hard of hearing and masks, had difficulty getting detailed history from patient, States that patient has been having SOB for awhile, but it got worse recently. States he coughs and produces a lot of mucus, which he sometimes has trouble getting up., CT scan shows possible PNA ,h/o chronic back pain , prior CVA

## 2021-09-08 NOTE — PHYSICAL THERAPY INITIAL EVALUATION ADULT - GENERAL OBSERVATIONS, REHAB EVAL
Pt was found lying in bed on tele, 3 lits of o2, o2 sats of 92% at rest, feeling weak but willing to participate in PT, feeling hungry and requesting breakfast, RN made aware

## 2021-09-08 NOTE — CONSULT NOTE ADULT - ASSESSMENT
89 y/o M w/ PMH of dementia, depression, gastritis, diastolic CHF, BPH, HTN, CVA, CAD s/p PCI, PPM, a-fib (on coumadin), chronic oden, p/w dyspnea. Patient states he has difficulty breathing, but patient able to speak in full sentences. Due to hard of hearing and masks, had difficulty getting detailed history from patient. Called daughter Maria Antonia Berger. States that patient has been having SOB for awhile, but it got worse recently. States he coughs and produces a lot of mucus, which he sometimes has trouble getting up. Patient had hoarseness at bedside during my inteview, patient's daughter states that hoarseness has been developing for awhile with age, and is not acute. Denies h/o COPD. Denies fever, chills, nausea, vomiting, abdominal pain, CP, urinary complaints   He aslo h/o chronic back pain , prior CVA , hyperlipedemia,PPM and chronic diastolic CHF , CT scan shows possible PNA , he' scurrently lying flat without CP or SOB , boerderline trop but likely from RV starin and not acute ischmeia  1) agree with IV Lasix but follow BUN CR closely given h/o CRI   2) IV ABX for PNA   3) tele shows 100 % V pacing , call EP to interrogate PPM

## 2021-09-09 NOTE — PROGRESS NOTE ADULT - ASSESSMENT
SOB, Acute on chronic HFPEF- multifactorial , PNA.  he did not followup in office since COVID pandemic started.  He appears euvolemic on exam.  on abx.  Diuresis with close monitoring of the renal function and electrolytes.  Goal potassium of 4 and magnesium of 2.   Strict I/O and daily wt checks. Low sodium diet. Nutrition education.     CAD s/p PCI- no active anginal symptoms.  continue outpt medical regimen. on statin and metoprolol.    PNA- Management per primary team.   Other medical issues- Management per primary team.   Thank you for allowing me to participate in the care of this patient. Please feel free to contact me with any questions.

## 2021-09-09 NOTE — PROGRESS NOTE ADULT - ASSESSMENT
MEDICATIONS  (STANDING):  atorvastatin 40 milliGRAM(s) Oral at bedtime  azithromycin  IVPB      azithromycin  IVPB 500 milliGRAM(s) IV Intermittent every 24 hours  cefTRIAXone Injectable. 1000 milliGRAM(s) IV Push every 24 hours  finasteride 5 milliGRAM(s) Oral daily  furosemide   Injectable 40 milliGRAM(s) IV Push daily  metoprolol succinate ER 12.5 milliGRAM(s) Oral daily  potassium chloride    Tablet ER 10 milliEquivalent(s) Oral daily  sertraline 25 milliGRAM(s) Oral daily  tamsulosin 0.4 milliGRAM(s) Oral at bedtime  timolol 0.5% Solution 1 Drop(s) Both EYES daily  traZODone 25 milliGRAM(s) Oral at bedtime    MEDICATIONS  (PRN):        89 y/o M w/ PMH of dementia, depression, gastritis, diastolic CHF, BPH, HTN, CVA, CAD s/p PCI, PPM, a-fib (on coumadin), chronic oden, p/w dyspnea.    *Dyspnea 2/2 Acute on chronic diastolic CHF vs r/o PNA   -S/p Lasix 80 IV in ED. Will start lasix 40 IV daily   -I/Os  -On BB, will place holding parameters as patient had HR of 59  -Not on ACEi, will defer to cardio/nephro given renal failure   -Cardio/Nephrology consult   -I/Os + Daily weights   -Echo 10/20 - EF 55-60%, moderate pulm HTN, valvular abnormalities   -CT chest to evaluate for PNA  -Ceftriaxone / zithromax  -Lactate = 1.1  -F/u blood cx   -COVID-19 PCR negative    *Mildly elevated troponin w/ h/o CAD  - Possibly 2/2 CHF?  -Troponin was normal during previous admission even with CKD. Will contiue to trend troponins  -Cardio consult  -ASA  -Echo  -Tele    *UTI  -Ceftriaxone  -F/u urine cx     *JACQUELIN on CKD Stage IV.. Initially improved and slightly worsened after (9/9)  -Trend creatinine in AM   -Avoid nephrotoxic agents  -I/Os   -Consulte nephrology    *Anemia  -Trend H/H and if stable -> F/u outpatient for further work up   -No overt signs of bleeding. around baseline.     *A-fib   -On coumadin  -Follow INR and order coumadin accordingly     *H/o dementia / depression / gastritis / BPH / HTN / CVA / PPM   -C/w home meds (except as noted above) and if conditions remain stable, f/u outpatient for further management     *Placement  -Daughter states patient lives by himself, and would like for patient to go to rehab  -PT consult   -Social work consult     *DVT ppx  -On coumadin

## 2021-09-10 NOTE — CHART NOTE - NSCHARTNOTEFT_GEN_A_CORE
I was called to evaluate    In summary,   89M  NO MOLST in chart  Here for SOB  Being Tx for CHF    RRT called for unresponsiveness    I arrived to find Pt poorly responsive  Agonally breathing  Hypotensive, hypoxic    Pressors, IVF bolus ordered    I called Pt listed HCP on chart daughter Maria Antonia     After discussion, Maria Antonia stated full code without restriction    I then return to bedside and resident team to call family back to continue GOC discussion    Pt became unresponsive  Loss of pulse    CPR, ACLS started    Epi given, ventilated via BVM    PEA 1st pulse check (Pt has a PPM)  CPR, ACLS continued    During CPR/ACLS, medical team comes in to state that family wishes to cease resuscitative efforts and allow natural death    CPR/ACLS terminated    Pt without pulse, PEA (pacer)  No spontaneous respirations  Unresponsive    Pt pronounced at 2145    Family made aware by resident team I was called to evaluate    In summary,   89M  NO MOLST in chart  Here for SOB  Being Tx for CHF    RRT called for unresponsiveness    I arrived to find Pt poorly responsive  Agonally breathing  Hypotensive, hypoxic    Pressors, IVF bolus, oxygen therapy (NRB + NC) verbally ordered  ABG, CXR requested    I called Pt listed HCP on chart daughter Maria Antonia  given gravity of situation, Pt age and multiple co morbidities to establish GOC    After discussion, Maria Antonia stated full code without restriction    I then return to bedside and resident team to call family back to continue GOC discussion    As resuscitation ongoing, Pt became unresponsive with loss of pulse    CPR, ACLS started    Epi given, ventilated via BVM    PEA 1st pulse check (Pt has a PPM)  CPR, ACLS continued    During CPR/ACLS, medical team comes in to state that family wishes to cease resuscitative efforts and allow natural death    CPR/ACLS terminated    Pt without pulse, PEA (pacer)  No spontaneous respirations  Unresponsive    Pt pronounced at 2145    Family made aware by resident team  Nursing administration at bedside, aware  Medical team at bedside, aware

## 2021-09-10 NOTE — DISCHARGE NOTE NURSING/CASE MANAGEMENT/SOCIAL WORK - PATIENT PORTAL LINK FT
You can access the FollowMyHealth Patient Portal offered by [facility] by registering at the following website: [email or web address]. By joining [employer]’s FollowMyHealth portal, you will also be able to view your health information using other applications (apps) compatible with our system.

## 2021-09-10 NOTE — PROGRESS NOTE ADULT - ASSESSMENT
1. SOB, Acute on chronic HFPEF- multifactorial , PNA.  he did not followup in office since COVID pandemic started.  He appears euvolemic on exam. On abx.  Diuresis with close monitoring of the renal function and electrolytes.  Goal potassium of 4 and magnesium of 2.   Strict I/O and daily wt checks. Low sodium diet. Nutrition education.     2. CAD s/p PCI- no active anginal symptoms.  continue outpt medical regimen. on statin and metoprolol.  Trops negative in second and third set.   Continue conservative medical mgmt.    3. PNA- Management per primary team.     4. DVT proph. Hold on further testing for now.  Will sign off. Please call with any questions.

## 2021-09-10 NOTE — DISCHARGE NOTE NURSING/CASE MANAGEMENT/SOCIAL WORK - NSDCVIVACCINE_GEN_ALL_CORE_FT
Tdap; 04-Jul-2019 13:43; Suzy Rust (XIN); [employer]; [id number] (Exp. Date: 21-Mar-2021); IntraMuscular; Deltoid Right.; 0.5 milliLiter(s); VIS (VIS Published: 09-May-2013, VIS Presented: 04-Jul-2019);

## 2021-09-10 NOTE — RAPID RESPONSE TEAM SUMMARY - NSSITUATIONBACKGROUNDRRT_GEN_ALL_CORE
89 y/o M w/ PMH of dementia, depression, gastritis, diastolic CHF, BPH, HTN, CVA, CAD s/p PCI, PPM, a-fib (on coumadin), chronic oden, p/w dyspnea admitted to the floor for Acute on Chronic CHF exacerbation, with hospital course complicated by progressive acute Hypoxic respiratory failure with hypotension, appearing to be in his final stages of Death. Pt's family contacted and made aware of the pt's current situation, after which his status was converted to DNR/DNI.

## 2021-09-10 NOTE — PROGRESS NOTE ADULT - SUBJECTIVE AND OBJECTIVE BOX
CC: SOB  SUBJECTIVE: Prior to admission with increased progressive PANDYA and SOB at rest; Cough with sputum production improving. Denies fever, dizziness, chest pain, nausea, vomiting  Review of Systems: 14 Point review of systems reviewed and reported as negative unless otherwise stated in Subjective    FROM H&P:    "89 y/o M w/ PMH of dementia, depression, gastritis, diastolic CHF, BPH, HTN, CVA, CAD s/p PCI, PPM, a-fib (on coumadin), chronic oden, p/w dyspnea. Patient states he has difficulty breathing, but patient able to speak in full sentences. Due to hard of hearing and masks, had difficulty getting detailed history from patient. Called daughter Maria Antonia Berger. States that patient has been having SOB for awhile, but it got worse recently. States he coughs and produces a lot of mucus, which he sometimes has trouble getting up. Patient had hoarseness at bedside during my inteview, patient's daughter states that hoarseness has been developing for awhile with age, and is not acute. Denies h/o COPD. Denies fever, chills, nausea, vomiting, abdominal pain, CP, urinary complaints     PSH: Appendectomy    Social Hx: Denies x 3    Family Hx: Denies"    PHYSICAL EXAM:    T(C): 36.5 (09-10-21 @ 15:49), Max: 36.5 (09-10-21 @ 15:49)  HR: 70 (09-10-21 @ 15:49) (59 - 70)  BP: 99/63 (09-10-21 @ 15:49) (99/63 - 117/57)  RR: 18 (09-10-21 @ 15:49) (18 - 18)  SpO2: 97% (09-10-21 @ 15:49) (97% - 100%)  General: AAOx3; NAD; Frail Appearing  Head: AT/NC  ENT: Moist Mucous Membranes; No Injury  Neck: Non-tender; No JVD  CVS: RRR, S1&S2,Systolic ejection murmur; possible diastolic murmur, No edema  Respiratory: Decreased breath sounds bilaterally; Normal respiratory effort; Respiratory support wth NC  Abdomen/GI: Soft, non-tender, non-distended, no guarding, no rebound, normal bowel sounds  : No bladder distention, Foly (+)  Extremites: No cyanosis, No clubbing, No edema  MSK: No CVA tenderness, Normal ROM, No injury  Neuro: AAOx3, CNII-XII grossly intact, non-focal  Psych: Appropriate, Cooperative,   Skin: Clean, Dry and Intact                          8.1    6.41  )-----------( 312      ( 10 Sep 2021 11:37 )             25.6     09-10    140  |  109<H>  |  87<H>  ----------------------------<  92  4.1   |  26  |  2.36<H>    Ca    8.6      10 Sep 2021 11:37  Mg     2.2     09-09      COVID-19 PCR: NotDetec (07 Sep 2021 17:26)    CAPILLARY BLOOD GLUCOSE          Culture - Blood (collected 07 Sep 2021 17:26)  Source: .Blood Blood-Peripheral  Preliminary Report (09 Sep 2021 06:01):    No growth to date.    Culture - Blood (collected 07 Sep 2021 17:26)  Source: .Blood Blood-Peripheral  Preliminary Report (09 Sep 2021 06:01):    No growth to date.    Culture - Urine (collected 07 Sep 2021 17:26)  Source: Catheterized Catheterized  Final Report (09 Sep 2021 20:43):    >100,000 CFU/ml Pseudomonas aeruginosa  Organism: Pseudomonas aeruginosa (09 Sep 2021 20:43)  Organism: Pseudomonas aeruginosa (09 Sep 2021 20:43)                            7.8    6.45  )-----------( 322      ( 09 Sep 2021 06:42 )             24.2     09-09    144  |  112<H>  |  86<H>  ----------------------------<  86  4.4   |  24  |  2.57<H>    Ca    8.6      09 Sep 2021 06:42  Mg     2.2     09-09    TPro  7.2  /  Alb  2.7<L>  /  TBili  0.4  /  DBili  x   /  AST  19  /  ALT  17  /  AlkPhos  82  09-08    COVID-19 PCR: NotDetec (07 Sep 2021 17:26)    CAPILLARY BLOOD GLUCOSE          Culture - Blood (collected 07 Sep 2021 17:26)  Source: .Blood Blood-Peripheral  Preliminary Report (09 Sep 2021 06:01):    No growth to date.    Culture - Blood (collected 07 Sep 2021 17:26)  Source: .Blood Blood-Peripheral  Preliminary Report (09 Sep 2021 06:01):    No growth to date.    Culture - Urine (collected 07 Sep 2021 17:26)  Source: Catheterized Catheterized  Final Report (09 Sep 2021 20:43):    >100,000 CFU/ml Pseudomonas aeruginosa  Organism: Pseudomonas aeruginosa (09 Sep 2021 20:43)  Organism: Pseudomonas aeruginosa (09 Sep 2021 20:43)                            8.3    6.17  )-----------( 329      ( 08 Sep 2021 06:55 )             25.3     09-08    139  |  108  |  74<H>  ----------------------------<  85  4.0   |  25  |  2.15<H>    Ca    8.7      08 Sep 2021 06:55    TPro  7.2  /  Alb  2.7<L>  /  TBili  0.4  /  DBili  x   /  AST  19  /  ALT  17  /  AlkPhos  82  09-08    COVID-19 PCR: NotDetec (07 Sep 2021 17:26)    CAPILLARY BLOOD GLUCOSE        < from: CT Chest No Cont (09.07.21 @ 22:19) >    IMPRESSION:  *  Unchanged pulmonary fibrosis and small right pleural effusion.  *  Possible small left upper lobe pneumonia.    < end of copied text >    I reviewed labs, imaging, orders and vitals. 
  CC: SOB  SUBJECTIVE: Prior to admission with increased progressive PANDYA and SOB at rest; Cough with sputum production. Denies fever, dizziness, chest pain, nausea, vomiting  Review of Systems: 14 Point review of systems reviewed and reported as negative unless otherwise stated in Subjective    FROM H&P:    "89 y/o M w/ PMH of dementia, depression, gastritis, diastolic CHF, BPH, HTN, CVA, CAD s/p PCI, PPM, a-fib (on coumadin), chronic oden, p/w dyspnea. Patient states he has difficulty breathing, but patient able to speak in full sentences. Due to hard of hearing and masks, had difficulty getting detailed history from patient. Called daughter Maria Antonia Berger. States that patient has been having SOB for awhile, but it got worse recently. States he coughs and produces a lot of mucus, which he sometimes has trouble getting up. Patient had hoarseness at bedside during my inteview, patient's daughter states that hoarseness has been developing for awhile with age, and is not acute. Denies h/o COPD. Denies fever, chills, nausea, vomiting, abdominal pain, CP, urinary complaints     PSH: Appendectomy    Social Hx: Denies x 3    Family Hx: Denies"    PHYSICAL EXAM:    T(C): 36.3 (09-09-21 @ 17:43), Max: 36.8 (09-09-21 @ 08:55)  HR: 59 (09-09-21 @ 17:43) (58 - 59)  BP: 108/42 (09-09-21 @ 17:43) (104/48 - 114/44)  RR: 18 (09-09-21 @ 17:43) (18 - 18)  SpO2: 100% (09-09-21 @ 17:43) (99% - 100%)  General: AAOx3; NAD; Frail Appearing  Head: AT/NC  ENT: Moist Mucous Membranes; No Injury  Neck: Non-tender; No JVD  CVS: RRR, S1&S2,Systolic ejection murmur; possible diastolic murmur, No edema  Respiratory: Decreased breath sounds bilaterally; Normal respiratory effort; Respiratory support wth NC  Abdomen/GI: Soft, non-tender, non-distended, no guarding, no rebound, normal bowel sounds  : No bladder distention, Foly (+)  Extremites: No cyanosis, No clubbing, No edema  MSK: No CVA tenderness, Normal ROM, No injury  Neuro: AAOx3, CNII-XII grossly intact, non-focal  Psych: Appropriate, Cooperative,   Skin: Clean, Dry and Intact                          7.8    6.45  )-----------( 322      ( 09 Sep 2021 06:42 )             24.2     09-09    144  |  112<H>  |  86<H>  ----------------------------<  86  4.4   |  24  |  2.57<H>    Ca    8.6      09 Sep 2021 06:42  Mg     2.2     09-09    TPro  7.2  /  Alb  2.7<L>  /  TBili  0.4  /  DBili  x   /  AST  19  /  ALT  17  /  AlkPhos  82  09-08    COVID-19 PCR: NotDetec (07 Sep 2021 17:26)    CAPILLARY BLOOD GLUCOSE          Culture - Blood (collected 07 Sep 2021 17:26)  Source: .Blood Blood-Peripheral  Preliminary Report (09 Sep 2021 06:01):    No growth to date.    Culture - Blood (collected 07 Sep 2021 17:26)  Source: .Blood Blood-Peripheral  Preliminary Report (09 Sep 2021 06:01):    No growth to date.    Culture - Urine (collected 07 Sep 2021 17:26)  Source: Catheterized Catheterized  Final Report (09 Sep 2021 20:43):    >100,000 CFU/ml Pseudomonas aeruginosa  Organism: Pseudomonas aeruginosa (09 Sep 2021 20:43)  Organism: Pseudomonas aeruginosa (09 Sep 2021 20:43)                            8.3    6.17  )-----------( 329      ( 08 Sep 2021 06:55 )             25.3     09-08    139  |  108  |  74<H>  ----------------------------<  85  4.0   |  25  |  2.15<H>    Ca    8.7      08 Sep 2021 06:55    TPro  7.2  /  Alb  2.7<L>  /  TBili  0.4  /  DBili  x   /  AST  19  /  ALT  17  /  AlkPhos  82  09-08    COVID-19 PCR: NotDetec (07 Sep 2021 17:26)    CAPILLARY BLOOD GLUCOSE        < from: CT Chest No Cont (09.07.21 @ 22:19) >    IMPRESSION:  *  Unchanged pulmonary fibrosis and small right pleural effusion.  *  Possible small left upper lobe pneumonia.    < end of copied text >    I reviewed labs, imaging, orders and vitals. 
Patient is a 89y old  Male who presents with a chief complaint of dyspnea.       HPI:  89 y/o M w/ PMH of dementia, depression, gastritis, diastolic CHF, BPH, HTN, CVA, CAD s/p PCI, PPM, a-fib (on coumadin), chronic oden, p/w dyspnea. Patient states he has difficulty breathing, but patient able to speak in full sentences. Due to hard of hearing and masks, had difficulty getting detailed history from patient. Called daughter Maria Antonia Berger. States that patient has been having SOB for awhile, but it got worse recently. States he coughs and produces a lot of mucus, which he sometimes has trouble getting up. Patient had hoarseness at bedside during my inteview, patient's daughter states that hoarseness has been developing for awhile with age, and is not acute. Denies h/o COPD. Denies fever, chills, nausea, vomiting, abdominal pain, CP, urinary complaints     PSH: Appendectomy    Social Hx: Denies x 3    Family Hx: Denies     Pt seen this am.  Pt denies any CP or SOB this am.  No overnight events.         PAST MEDICAL & SURGICAL HISTORY:  Pacemaker    Coronary artery disease  s/p PCI    HTN (hypertension)    BPH (benign prostatic hyperplasia)    CVA (cerebrovascular accident)  Over 10 years ago    History of gastritis    Depression    Dementia    Congestive heart failure    History of appendectomy  Over 50 years ago    S/P coronary artery stent placement        MEDICATIONS  (STANDING):  atorvastatin 40 milliGRAM(s) Oral at bedtime  azithromycin  IVPB      azithromycin  IVPB 500 milliGRAM(s) IV Intermittent every 24 hours  cefTRIAXone Injectable. 1000 milliGRAM(s) IV Push every 24 hours  finasteride 5 milliGRAM(s) Oral daily  furosemide   Injectable 40 milliGRAM(s) IV Push daily  metoprolol succinate ER 12.5 milliGRAM(s) Oral daily  potassium chloride    Tablet ER 10 milliEquivalent(s) Oral daily  sertraline 25 milliGRAM(s) Oral daily  tamsulosin 0.4 milliGRAM(s) Oral at bedtime  timolol 0.5% Solution 1 Drop(s) Both EYES daily  traZODone 25 milliGRAM(s) Oral at bedtime    MEDICATIONS  (PRN):      FAMILY HISTORY:  Patient unable to provide medical history  patient does not known parents history        SOCIAL HISTORY: no recent smoking     REVIEW OF SYSTEMS:  CONSTITUTIONAL:    No fatigue, malaise, lethargy.  No fever or chills.  RESPIRATORY:  No cough.  No wheeze.  No hemoptysis.  c/o shortness of breath.  CARDIOVASCULAR:  No chest pains.  No palpitations. c/o shortness of breath, No orthopnea or PND.  GASTROINTESTINAL:  No abdominal pain.  No nausea or vomiting.    GENITOURINARY:    No hematuria.    MUSCULOSKELETAL:  No musculoskeletal pain.  No joint swelling.  No arthritis.  NEUROLOGICAL:  No tingling or numbness or weakness.  PSYCHIATRIC:  No confusion  SKIN:  No rashes.          Vital Signs Last 24 Hrs  T(C): 36.6 (09 Sep 2021 00:03), Max: 36.7 (08 Sep 2021 12:09)  T(F): 97.9 (09 Sep 2021 00:03), Max: 98 (08 Sep 2021 12:09)  HR: 58 (09 Sep 2021 00:03) (58 - 59)  BP: 104/48 (09 Sep 2021 00:03) (104/48 - 125/54)  BP(mean): --  RR: 18 (08 Sep 2021 16:35) (18 - 18)  SpO2: 100% (09 Sep 2021 00:03) (95% - 100%)    PHYSICAL EXAM-    Constitutional: elderly frail male in no acute distress     Head: Head is normocephalic and atraumatic.      Neck: No JVD.     Cardiovascular: Regular rate and rhythm without S3, S4. No murmurs or rubs are appreciated.      Respiratory: B/l basal rales. No wheezing.    Abdomen: Soft, nontender, nondistended with positive bowel sounds.      Extremity: No tenderness. No  pitting edema     Neurologic: The patient is alert and oriented.      Skin: No rash, no obvious lesions noted.      Psychiatric: The patient appears to be emotionally stable.      INTERPRETATION OF TELEMETRY: V paced rythm, there could be underlying heart block.     ECG:     I&O's Detail    08 Sep 2021 07:01  -  09 Sep 2021 07:00  --------------------------------------------------------  IN:  Total IN: 0 mL    OUT:    Indwelling Catheter - Urethral (mL): 800 mL  Total OUT: 800 mL    Total NET: -800 mL          LABS:                        7.8    6.45  )-----------( 322      ( 09 Sep 2021 06:42 )             24.2         144  |  112<H>  |  86<H>  ----------------------------<  86  4.4   |  24  |  2.57<H>    Ca    8.6      09 Sep 2021 06:42  Mg     2.2         TPro  7.2  /  Alb  2.7<L>  /  TBili  0.4  /  DBili  x   /  AST  19  /  ALT  17  /  AlkPhos  82      CARDIAC MARKERS ( 08 Sep 2021 00:56 )  0.040 ng/mL / x     / x     / x     / x      CARDIAC MARKERS ( 07 Sep 2021 22:33 )  0.041 ng/mL / x     / x     / x     / x      CARDIAC MARKERS ( 07 Sep 2021 17:26 )  0.046 ng/mL / x     / x     / x     / x          PT/INR - ( 09 Sep 2021 06:42 )   PT: 32.8 sec;   INR: 2.98 ratio         PTT - ( 08 Sep 2021 06:55 )  PTT:40.3 sec  Urinalysis Basic - ( 07 Sep 2021 17:30 )    Color: Yellow / Appearance: Clear / S.015 / pH: x  Gluc: x / Ketone: Negative  / Bili: Negative / Urobili: Negative mg/dL   Blood: x / Protein: 30 mg/dL / Nitrite: Negative   Leuk Esterase: Moderate / RBC: 3-5 /HPF / WBC 11-25   Sq Epi: x / Non Sq Epi: Moderate / Bacteria: Moderate      I&O's Summary    08 Sep 2021 07:01  -  09 Sep 2021 07:00  --------------------------------------------------------  IN: 0 mL / OUT: 800 mL / NET: -800 mL      BNP  RADIOLOGY & ADDITIONAL STUDIES:  c< from: CT Chest No Cont (21 @ 22:19) >    IMPRESSION:  *  Unchanged pulmonary fibrosis and small right pleural effusion.  *  Possible small left upper lobe pneumonia.      --- End of Report ---            CHANDANIESHA CAZARES MD; Attending Radiologist  This document has been electronically signed. Sep  8 2021  9:55AM    < end of copied text >  tt< from: TTE Echo Complete w/o Contrast w/ Doppler (10.05.20 @ 21:28) >     Summary     Estimated left ventricular ejection fraction is 55-60 %.   Mild concentric left ventricular hypertrophy is present.   The left ventricle is normal in size, wall motion and contractility as   seen in limited views.   The left atrium is moderately dilated.   An interatrial septal aneurysm is noted. There is no evidence of ASD.   Fibrocalcific changes noted to the Aortic valve leaflets with preserved   leaflet excursion.   Themitral valve leaflets appear thin and normal.   Mild (1+) mitral regurgitation is present.   EA reversal of the mitral inflow consistent with reduced compliance of the   left ventricle.   The mitral chordae tendineae appears calcified.   Normal appearing tricuspid valve structure.   Moderate (2+) tricuspid valve regurgitation is present.   Moderate pulmonary hypertension.     Signature     ----------------------------------------------------------------   Electronically signed by Manish Vieira(Interpreting physician)   on 10/06/2020 06:14 AM   ----------------------------------------------------------------    < end of copied text >  
  CC: SOB  SUBJECTIVE: Prior to admission with increased progressive PANDYA and SOB at rest; Cough with sputum production. Denies fever, dizziness, chest pain, nausea, vomiting  Review of Systems: 14 Point review of systems reviewed and reported as negative unless otherwise stated in Subjective    FROM H&P:    "89 y/o M w/ PMH of dementia, depression, gastritis, diastolic CHF, BPH, HTN, CVA, CAD s/p PCI, PPM, a-fib (on coumadin), chronic oden, p/w dyspnea. Patient states he has difficulty breathing, but patient able to speak in full sentences. Due to hard of hearing and masks, had difficulty getting detailed history from patient. Called daughter Maria Antonia Berger. States that patient has been having SOB for awhile, but it got worse recently. States he coughs and produces a lot of mucus, which he sometimes has trouble getting up. Patient had hoarseness at bedside during my inteview, patient's daughter states that hoarseness has been developing for awhile with age, and is not acute. Denies h/o COPD. Denies fever, chills, nausea, vomiting, abdominal pain, CP, urinary complaints     PSH: Appendectomy    Social Hx: Denies x 3    Family Hx: Denies"    PHYSICAL EXAM:    T(C): 36.6 (09-08-21 @ 16:35), Max: 36.7 (09-07-21 @ 17:29)  HR: 59 (09-08-21 @ 16:35) (59 - 72)  BP: 123/62 (09-08-21 @ 16:35) (111/74 - 160/65)  RR: 18 (09-08-21 @ 16:35) (15 - 20)  SpO2: 100% (09-08-21 @ 16:35) (94% - 100%)    General: AAOx3; NAD; Frail Appearing  Head: AT/NC  ENT: Moist Mucous Membranes; No Injury  Neck: Non-tender; No JVD  CVS: RRR, S1&S2,Systolic ejection murmur; possible diastolic murmur, No edema  Respiratory: Decreased breath sounds bilaterally; Normal respiratory effort; Respiratory support wth NC  Abdomen/GI: Soft, non-tender, non-distended, no guarding, no rebound, normal bowel sounds  : No bladder distention, Foly (+)  Extremites: No cyanosis, No clubbing, No edema  MSK: No CVA tenderness, Normal ROM, No injury  Neuro: AAOx3, CNII-XII grossly intact, non-focal  Psych: Appropriate, Cooperative,   Skin: Clean, Dry and Intact                          8.3    6.17  )-----------( 329      ( 08 Sep 2021 06:55 )             25.3     09-08    139  |  108  |  74<H>  ----------------------------<  85  4.0   |  25  |  2.15<H>    Ca    8.7      08 Sep 2021 06:55    TPro  7.2  /  Alb  2.7<L>  /  TBili  0.4  /  DBili  x   /  AST  19  /  ALT  17  /  AlkPhos  82  09-08    COVID-19 PCR: NotDetec (07 Sep 2021 17:26)    CAPILLARY BLOOD GLUCOSE        < from: CT Chest No Cont (09.07.21 @ 22:19) >    IMPRESSION:  *  Unchanged pulmonary fibrosis and small right pleural effusion.  *  Possible small left upper lobe pneumonia.    < end of copied text >    I reviewed labs, imaging, orders and vitals. 
Cardiology Progress Note    HPI: 89 y/o M w/ PMH of dementia, depression, gastritis, diastolic CHF, BPH, HTN, CVA, CAD s/p PCI, PPM, a-fib (on coumadin), chronic oden, p/w dyspnea. Patient states he has difficulty breathing, but patient able to speak in full sentences. Due to hard of hearing and masks, had difficulty getting detailed history from patient. Called daughter Maria Antonia Berger. States that patient has been having SOB for awhile, but it got worse recently. States he coughs and produces a lot of mucus, which he sometimes has trouble getting up. Patient had hoarseness at bedside during my inteview, patient's daughter states that hoarseness has been developing for awhile with age, and is not acute. Denies h/o COPD. Denies fever, chills, nausea, vomiting, abdominal pain, CP, urinary complaints     9/10. No events last pm. Eating breakfast in bed.   No new complaints. No CP.     PSH: Appendectomy    Social Hx: Denies x 3    Family Hx: Denies     PAST MEDICAL & SURGICAL HISTORY:  Pacemaker    Coronary artery disease  s/p PCI    HTN (hypertension)    BPH (benign prostatic hyperplasia)    CVA (cerebrovascular accident)  Over 10 years ago    History of gastritis    Depression    Dementia    Congestive heart failure    History of appendectomy  Over 50 years ago    S/P coronary artery stent placement        MEDICATIONS  (STANDING):  atorvastatin 40 milliGRAM(s) Oral at bedtime  azithromycin  IVPB      azithromycin  IVPB 500 milliGRAM(s) IV Intermittent every 24 hours  cefTRIAXone Injectable. 1000 milliGRAM(s) IV Push every 24 hours  finasteride 5 milliGRAM(s) Oral daily  furosemide   Injectable 40 milliGRAM(s) IV Push daily  metoprolol succinate ER 12.5 milliGRAM(s) Oral daily  potassium chloride    Tablet ER 10 milliEquivalent(s) Oral daily  sertraline 25 milliGRAM(s) Oral daily  tamsulosin 0.4 milliGRAM(s) Oral at bedtime  timolol 0.5% Solution 1 Drop(s) Both EYES daily  traZODone 25 milliGRAM(s) Oral at bedtime    MEDICATIONS  (PRN):      FAMILY HISTORY:  Patient unable to provide medical history  patient does not known parents history        SOCIAL HISTORY: no recent smoking     REVIEW OF SYSTEMS:  CONSTITUTIONAL:    No fatigue, malaise, lethargy.  No fever or chills.  RESPIRATORY:  No cough.  No wheeze.  No hemoptysis.  c/o shortness of breath.  CARDIOVASCULAR:  No chest pains.  No palpitations. c/o shortness of breath, No orthopnea or PND.  GASTROINTESTINAL:  No abdominal pain.  No nausea or vomiting.    GENITOURINARY:    No hematuria.    MUSCULOSKELETAL:  No musculoskeletal pain.  No joint swelling.  No arthritis.  NEUROLOGICAL:  No tingling or numbness or weakness.  PSYCHIATRIC:  No confusion  SKIN:  No rashes.          Vital Signs Last 24 Hrs  T(C): 36.6 (09 Sep 2021 00:03), Max: 36.7 (08 Sep 2021 12:09)  T(F): 97.9 (09 Sep 2021 00:03), Max: 98 (08 Sep 2021 12:09)  HR: 58 (09 Sep 2021 00:03) (58 - 59)  BP: 104/48 (09 Sep 2021 00:03) (104/48 - 125/54)  BP(mean): --  RR: 18 (08 Sep 2021 16:35) (18 - 18)  SpO2: 100% (09 Sep 2021 00:03) (95% - 100%)    PHYSICAL EXAM-    Constitutional: elderly frail male in no acute distress     Head: Head is normocephalic and atraumatic.      Neck: No JVD.     Cardiovascular: Regular rate and rhythm without S3, S4. No murmurs or rubs are appreciated.      Respiratory: B/l basal rales. No wheezing.    Abdomen: Soft, nontender, nondistended with positive bowel sounds.      Extremity: No tenderness. No  pitting edema     Neurologic: The patient is alert and oriented.      Skin: No rash, no obvious lesions noted.      Psychiatric: The patient appears to be emotionally stable.      INTERPRETATION OF TELEMETRY: V paced rythm, there could be underlying heart block.     ECG:     I&O's Detail    08 Sep 2021 07:01  -  09 Sep 2021 07:00  --------------------------------------------------------  IN:  Total IN: 0 mL    OUT:    Indwelling Catheter - Urethral (mL): 800 mL  Total OUT: 800 mL    Total NET: -800 mL          LABS:                        7.8    6.45  )-----------( 322      ( 09 Sep 2021 06:42 )             24.2         144  |  112<H>  |  86<H>  ----------------------------<  86  4.4   |  24  |  2.57<H>    Ca    8.6      09 Sep 2021 06:42  Mg     2.2         TPro  7.2  /  Alb  2.7<L>  /  TBili  0.4  /  DBili  x   /  AST  19  /  ALT  17  /  AlkPhos  82  09-    CARDIAC MARKERS ( 08 Sep 2021 00:56 )  0.040 ng/mL / x     / x     / x     / x      CARDIAC MARKERS ( 07 Sep 2021 22:33 )  0.041 ng/mL / x     / x     / x     / x      CARDIAC MARKERS ( 07 Sep 2021 17:26 )  0.046 ng/mL / x     / x     / x     / x          PT/INR - ( 09 Sep 2021 06:42 )   PT: 32.8 sec;   INR: 2.98 ratio         PTT - ( 08 Sep 2021 06:55 )  PTT:40.3 sec  Urinalysis Basic - ( 07 Sep 2021 17:30 )    Color: Yellow / Appearance: Clear / S.015 / pH: x  Gluc: x / Ketone: Negative  / Bili: Negative / Urobili: Negative mg/dL   Blood: x / Protein: 30 mg/dL / Nitrite: Negative   Leuk Esterase: Moderate / RBC: 3-5 /HPF / WBC 11-25   Sq Epi: x / Non Sq Epi: Moderate / Bacteria: Moderate      I&O's Summary    08 Sep 2021 07:01  -  09 Sep 2021 07:00  --------------------------------------------------------  IN: 0 mL / OUT: 800 mL / NET: -800 mL      BNP  RADIOLOGY & ADDITIONAL STUDIES:  c< from: CT Chest No Cont (21 @ 22:19) >    IMPRESSION:  *  Unchanged pulmonary fibrosis and small right pleural effusion.  *  Possible small left upper lobe pneumonia.      --- End of Report ---            CHANDANIESHA CAZARES MD; Attending Radiologist  This document has been electronically signed. Sep  8 2021  9:55AM    < end of copied text >  tt< from: TTE Echo Complete w/o Contrast w/ Doppler (10.05.20 @ 21:28) >     Summary     Estimated left ventricular ejection fraction is 55-60 %.   Mild concentric left ventricular hypertrophy is present.   The left ventricle is normal in size, wall motion and contractility as   seen in limited views.   The left atrium is moderately dilated.   An interatrial septal aneurysm is noted. There is no evidence of ASD.   Fibrocalcific changes noted to the Aortic valve leaflets with preserved   leaflet excursion.   Themitral valve leaflets appear thin and normal.   Mild (1+) mitral regurgitation is present.   EA reversal of the mitral inflow consistent with reduced compliance of the   left ventricle.   The mitral chordae tendineae appears calcified.   Normal appearing tricuspid valve structure.   Moderate (2+) tricuspid valve regurgitation is present.   Moderate pulmonary hypertension.     Signature     ----------------------------------------------------------------   Electronically signed by Manish Vieira(Interpreting physician)   on 10/06/2020 06:14 AM   ----------------------------------------------------------------    < end of copied text >

## 2021-09-10 NOTE — PROGRESS NOTE ADULT - ASSESSMENT
MEDICATIONS  (STANDING):  atorvastatin 40 milliGRAM(s) Oral at bedtime  azithromycin  IVPB      azithromycin  IVPB 500 milliGRAM(s) IV Intermittent every 24 hours  cefTRIAXone Injectable. 1000 milliGRAM(s) IV Push every 24 hours  finasteride 5 milliGRAM(s) Oral daily  furosemide   Injectable 40 milliGRAM(s) IV Push daily  metoprolol succinate ER 12.5 milliGRAM(s) Oral daily  potassium chloride    Tablet ER 10 milliEquivalent(s) Oral daily  sertraline 25 milliGRAM(s) Oral daily  tamsulosin 0.4 milliGRAM(s) Oral at bedtime  timolol 0.5% Solution 1 Drop(s) Both EYES daily  traZODone 25 milliGRAM(s) Oral at bedtime    MEDICATIONS  (PRN):        89 y/o M w/ PMH of dementia, depression, gastritis, diastolic CHF, BPH, HTN, CVA, CAD s/p PCI, PPM, a-fib (on coumadin), chronic oden, p/w dyspnea.    *Dyspnea 2/2 Acute on chronic diastolic CHF   *Possible aspiration pneumonia but unable to clinically determine.   -S/p Lasix 80 IV in ED.   -Continue lasix and titrate accordingly.   -I/Os  -On BB, will place holding parameters   -Not on ACEi, will defer to cardio/nephro given renal failure   -Cardio/Nephrology consult   -I/Os + Daily weights   -Echo 10/20 - EF 55-60%, moderate pulm HTN, valvular abnormalities    -Lactate = 1.1  -Blood culture NGTD  -COVID-19 PCR negative    *Mildly elevated troponin w/ h/o CAD  - Possibly 2/2 CHF?  -Troponin was normal during previous admission even with CKD. Will contiue to trend troponins  -Cardio consult  -ASA  -Echo  -Tele    *UTI associated with Chronic Indwelling catheter with Pseudomonas  -Change rocephin to zosyn to Cefepime.   -F/u urine cx     *JACQUELIN on CKD Stage IV.. Initially improved and slightly worsened after (9/9)  -Trend creatinine in AM   -Avoid nephrotoxic agents  -I/Os   -Consult nephrology    *Anemia  -Trend H/H and if stable -> F/u outpatient for further work up   -No overt signs of bleeding. around baseline.     *A-fib   -On coumadin  -Follow INR and order coumadin accordingly     *H/o dementia / depression / gastritis / BPH / HTN / CVA / PPM   -C/w home meds (except as noted above) and if conditions remain stable, f/u outpatient for further management     *Placement  -Daughter states patient lives by himself, and would like for patient to go to rehab  -PT consult   -Social work consult     *DVT ppx  -On coumadin

## 2021-09-10 NOTE — DISCHARGE NOTE FOR THE EXPIRED PATIENT - HOSPITAL COURSE
89 y/o M w/ PMH of dementia, depression, gastritis, diastolic CHF, BPH, HTN, CVA, CAD s/p PCI, PPM, a-fib (on coumadin), chronic oden, p/w dyspnea. Patient states he has difficulty breathing, but patient able to speak in full sentences. Due to hard of hearing and masks, had difficulty getting detailed history from patient. Called daughter Maria Antonia Berger. States that patient has been having SOB for awhile, but it got worse recently. States he coughs and produces a lot of mucus, which he sometimes has trouble getting up. Patient had hoarseness at bedside during my inteview, patient's daughter states that hoarseness has been developing for awhile with age, and is not acute. Denies h/o COPD. Denies fever, chills, nausea, vomiting, abdominal pain, CP, urinary complaints.    Patient status was stable over the course of his hospital stay and his sputum production and cough slowly decreased albeit not to baseline.     On 9/10/21 @2124 a rapid response was called for rapid desats in to the 60s. NC was placed in addition to the NRB and desats stabilized at 60-65% but did not improve. At 1241 patient flat lined and compressions were initiated. Patients daughter and son, Maria Antonia and Keenan, were called to discuss pts. code status as full code. Risks and benefits were discussed of these measures and both agreed for comfort measures only. Patient peacefully passed away at 5405.   365

## 2021-09-15 NOTE — ED PROVIDER NOTE - DATE/TIME 2
26-Jul-2021 17:35 Referred To Asc For Closure Text (Leave Blank If You Do Not Want): After obtaining clear surgical margins the patient was sent to an ASC for surgical repair.  The patient understands they will receive post-surgical care and follow-up from the ASC physician.

## 2021-09-16 DIAGNOSIS — J96.01 ACUTE RESPIRATORY FAILURE WITH HYPOXIA: ICD-10-CM

## 2021-09-16 DIAGNOSIS — I25.10 ATHEROSCLEROTIC HEART DISEASE OF NATIVE CORONARY ARTERY WITHOUT ANGINA PECTORIS: ICD-10-CM

## 2021-09-16 DIAGNOSIS — M54.9 DORSALGIA, UNSPECIFIED: ICD-10-CM

## 2021-09-16 DIAGNOSIS — G89.29 OTHER CHRONIC PAIN: ICD-10-CM

## 2021-09-16 DIAGNOSIS — F32.9 MAJOR DEPRESSIVE DISORDER, SINGLE EPISODE, UNSPECIFIED: ICD-10-CM

## 2021-09-16 DIAGNOSIS — T83.511A INFECTION AND INFLAMMATORY REACTION DUE TO INDWELLING URETHRAL CATHETER, INITIAL ENCOUNTER: ICD-10-CM

## 2021-09-16 DIAGNOSIS — I95.9 HYPOTENSION, UNSPECIFIED: ICD-10-CM

## 2021-09-16 DIAGNOSIS — N18.4 CHRONIC KIDNEY DISEASE, STAGE 4 (SEVERE): ICD-10-CM

## 2021-09-16 DIAGNOSIS — N39.0 URINARY TRACT INFECTION, SITE NOT SPECIFIED: ICD-10-CM

## 2021-09-16 DIAGNOSIS — Z79.01 LONG TERM (CURRENT) USE OF ANTICOAGULANTS: ICD-10-CM

## 2021-09-16 DIAGNOSIS — Z66 DO NOT RESUSCITATE: ICD-10-CM

## 2021-09-16 DIAGNOSIS — Z98.61 CORONARY ANGIOPLASTY STATUS: ICD-10-CM

## 2021-09-16 DIAGNOSIS — I50.33 ACUTE ON CHRONIC DIASTOLIC (CONGESTIVE) HEART FAILURE: ICD-10-CM

## 2021-09-16 DIAGNOSIS — Z51.5 ENCOUNTER FOR PALLIATIVE CARE: ICD-10-CM

## 2021-09-16 DIAGNOSIS — I13.0 HYPERTENSIVE HEART AND CHRONIC KIDNEY DISEASE WITH HEART FAILURE AND STAGE 1 THROUGH STAGE 4 CHRONIC KIDNEY DISEASE, OR UNSPECIFIED CHRONIC KIDNEY DISEASE: ICD-10-CM

## 2021-09-16 DIAGNOSIS — F03.90 UNSPECIFIED DEMENTIA WITHOUT BEHAVIORAL DISTURBANCE: ICD-10-CM

## 2021-09-16 DIAGNOSIS — Z86.73 PERSONAL HISTORY OF TRANSIENT ISCHEMIC ATTACK (TIA), AND CEREBRAL INFARCTION WITHOUT RESIDUAL DEFICITS: ICD-10-CM

## 2021-09-16 DIAGNOSIS — I48.91 UNSPECIFIED ATRIAL FIBRILLATION: ICD-10-CM

## 2021-09-16 DIAGNOSIS — N17.9 ACUTE KIDNEY FAILURE, UNSPECIFIED: ICD-10-CM

## 2021-09-16 DIAGNOSIS — D64.9 ANEMIA, UNSPECIFIED: ICD-10-CM

## 2021-09-16 DIAGNOSIS — B96.5 PSEUDOMONAS (AERUGINOSA) (MALLEI) (PSEUDOMALLEI) AS THE CAUSE OF DISEASES CLASSIFIED ELSEWHERE: ICD-10-CM

## 2021-09-16 DIAGNOSIS — Z79.899 OTHER LONG TERM (CURRENT) DRUG THERAPY: ICD-10-CM

## 2021-09-16 DIAGNOSIS — R06.00 DYSPNEA, UNSPECIFIED: ICD-10-CM

## 2021-09-16 DIAGNOSIS — N40.0 BENIGN PROSTATIC HYPERPLASIA WITHOUT LOWER URINARY TRACT SYMPTOMS: ICD-10-CM

## 2021-09-16 DIAGNOSIS — E78.5 HYPERLIPIDEMIA, UNSPECIFIED: ICD-10-CM

## 2021-09-16 DIAGNOSIS — J18.9 PNEUMONIA, UNSPECIFIED ORGANISM: ICD-10-CM

## 2021-09-20 NOTE — ED ADULT NURSE NOTE - NSIMPLEMENTINTERV_GEN_ALL_ED
Implemented All Universal Safety Interventions:  [place] to call system. Call bell, personal items and telephone within reach. Instruct patient to call for assistance. Room bathroom lighting operational. Non-slip footwear when patient is off stretcher. Physically safe environment: no spills, clutter or unnecessary equipment. Stretcher in lowest position, wheels locked, appropriate side rails in place. [Abdominal Pain] : abdominal pain [Diarrhea] : diarrhea [Heartburn] : heartburn [Arthralgias] : arthralgias [Joint Swelling] : no joint swelling [Joint Stiffness] : joint stiffness [Limb Swelling] : no limb swelling [Negative] : Heme/Lymph

## 2021-09-22 ENCOUNTER — APPOINTMENT (OUTPATIENT)
Dept: UROLOGY | Facility: CLINIC | Age: 86
End: 2021-09-22

## 2021-10-20 NOTE — ED PROVIDER NOTE - NORMAL, MLM
Patient: Wilmar Carmona    Procedure Summary     Date: 10/20/21 Room / Location: [facility]    Anesthesia Start: 1121 Anesthesia Stop: 1141    Procedure: ESOPHAGOGASTRODUODENOSCOPY (N/A ) Diagnosis:       Gastroesophageal reflux disease, unspecified whether esophagitis present      Epigastric pain      Finnegan's esophagus without dysplasia      (Gastroesophageal reflux disease, unspecified whether esophagitis present [K21.9])      (Epigastric pain [R10.13])      (Finnegan's esophagus without dysplasia [K22.70])    Surgeons: Hemal Bravo MD Provider: Blas Riddle MD    Anesthesia Type: MAC ASA Status: 3          Anesthesia Type: MAC    Vitals  Vitals Value Taken Time   /72 10/20/21 1138   Temp 36.9 °C (98.4 °F) 10/20/21 1135   Pulse 51 10/20/21 1141   Resp 17 10/20/21 1135   SpO2 92 % 10/20/21 1141   Vitals shown include unvalidated device data.        Post Anesthesia Care and Evaluation    Patient location during evaluation: PHASE II  Anesthetic complications: No anesthetic complications      
trina all pertinent systems normal

## 2021-12-06 NOTE — ED PROVIDER NOTE - DISPOSITION TYPE
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 Ambulatory with steady gait. AOx4.  
DISCHARGE

## 2022-01-04 NOTE — ED ADULT NURSE NOTE - NS ED NURSE LEVEL OF CONSCIOUSNESS AFFECT
Personalized Preventive Plan for Douglas Barone - 1/4/2022  Medicare offers a range of preventive health benefits. Some of the tests and screenings are paid in full while other may be subject to a deductible, co-insurance, and/or copay. Some of these benefits include a comprehensive review of your medical history including lifestyle, illnesses that may run in your family, and various assessments and screenings as appropriate. After reviewing your medical record and screening and assessments performed today your provider may have ordered immunizations, labs, imaging, and/or referrals for you. A list of these orders (if applicable) as well as your Preventive Care list are included within your After Visit Summary for your review. Other Preventive Recommendations:    · A preventive eye exam performed by an eye specialist is recommended every 1-2 years to screen for glaucoma; cataracts, macular degeneration, and other eye disorders. · A preventive dental visit is recommended every 6 months. · Try to get at least 150 minutes of exercise per week or 10,000 steps per day on a pedometer . · Order or download the FREE \"Exercise & Physical Activity: Your Everyday Guide\" from [employer]. Call 4-947.117.2387 or search [employer] online. · You need 1643-3663 mg of calcium and 1850-7834 IU of vitamin D per day. It is possible to meet your calcium requirement with diet alone, but a vitamin D supplement is usually necessary to meet this goal.  · When exposed to the sun, use a sunscreen that protects against both UVA and UVB radiation with an SPF of 30 or greater. Reapply every 2 to 3 hours or after sweating, drying off with a towel, or swimming. · Always wear a seat belt when traveling in a car. Always wear a helmet when riding a bicycle or motorcycle. Patient Education        Well Visit, Over 72: Care Instructions  Overview     Well visits can help you stay healthy.  Your doctor has checked your overall health and may have suggested ways to take good care of yourself. Your doctor also may have recommended tests. At home, you can help prevent illness with healthy eating, regular exercise, and other steps. Follow-up care is a key part of your treatment and safety. Be sure to make and go to all appointments, and call your doctor if you are having problems. It's also a good idea to know your test results and keep a list of the medicines you take. How can you care for yourself at home? Get screening tests that you and your doctor decide on. Screening helps find diseases before any symptoms appear. Eat healthy foods. Choose fruits, vegetables, whole grains, protein, and low-fat dairy foods. Limit fat, especially saturated fat. Reduce salt in your diet. Limit alcohol. If you are a man, have no more than 2 drinks a day or 14 drinks a week. If you are a woman, have no more than 1 drink a day or 7 drinks a week. Since alcohol affects older adults differently, you may want to limit alcohol even more. Or you may not want to drink at all. Get at least 30 minutes of exercise on most days of the week. Walking is a good choice. You also may want to do other activities, such as running, swimming, cycling, or playing tennis or team sports. Reach and stay at a healthy weight. This will lower your risk for many problems, such as obesity, diabetes, heart disease, and high blood pressure. Do not smoke. Smoking can make health problems worse. If you need help quitting, talk to your doctor about stop-smoking programs and medicines. These can increase your chances of quitting for good. Care for your mental health. It is easy to get weighed down by worry and stress. Learn strategies to manage stress, like deep breathing and mindfulness, and stay connected with your family and community. If you find you often feel sad or hopeless, talk with your doctor. Treatment can help.   Talk to your doctor about whether you have any risk factors for sexually transmitted infections (STIs). You can help prevent STIs if you wait to have sex with a new partner (or partners) until you've each been tested for STIs. It also helps if you use condoms (male or female condoms) and if you limit your sex partners to one person who only has sex with you. Vaccines are available for some STIs. If you think you may have a problem with alcohol or drug use, talk to your doctor. This includes prescription medicines (such as amphetamines and opioids) and illegal drugs (such as cocaine and methamphetamine). Your doctor can help you figure out what type of treatment is best for you. Protect your skin from too much sun. When you're outdoors from 10 a.m. to 4 p.m., stay in the shade or cover up with clothing and a hat with a wide brim. Wear sunglasses that block UV rays. Even when it's cloudy, put broad-spectrum sunscreen (SPF 30 or higher) on any exposed skin. See a dentist one or two times a year for checkups and to have your teeth cleaned. Wear a seat belt in the car. When should you call for help? Watch closely for changes in your health, and be sure to contact your doctor if you have any problems or symptoms that concern you. Where can you learn more? Go to https://Syncbakelizaeb.healthChangeMobpartners. org and sign in to your BitPass account. Enter L622 in the [facility] box to learn more about \"Well Visit, Over 65: Care Instructions. \"     If you do not have an account, please click on the \"Sign Up Now\" link. Current as of: October 6, 2021               Content Version: 13.1  © 8797-0413 Healthwise, [employer]. Care instructions adapted under license by [facility] ([facility]). If you have questions about a medical condition or this instruction, always ask your healthcare professional. [place] [address] any warranty or liability for your use of this information.          Patient Education        Learning About Depression Calm/Appropriate Screening  What is depression screening? Depression screening is a way to see if you have depression symptoms. It may be done by a doctor or counselor. It's often part of a routine checkup. That's because your mental health is just as important as your physical health. Depression is a medical illness that affects how you feel, think, and act. You may:  Have less energy. Lose interest in your daily activities. Feel sad and grouchy for a long time. Depression is very common. It affects men and women of all ages. Many things can trigger depression. Some people become depressed after they have a stroke or find out they have a major illness like cancer or heart disease. The death of a loved one, a breakup, or changes in the natural brain chemicals may lead to depression. It can run in families. Most experts believe that a combination of inherited genes and stressful life events can cause it. What happens during screening? You may be asked to fill out a form about your depression symptoms. You and the doctor will discuss your answers. The doctor may ask you more questions to learn more about how you think, act, and feel. What happens after screening? If you have signs of depression, your doctor will talk to you about your options. Doctors usually treat depression with medicines or counseling. Often, combining the two works best. Many people don't get help because they think that they'll get over the depression on their own. But people with depression may not get better unless they get treatment. Many people feel embarrassed or ashamed about having depression. But it isn't a sign of personal weakness. It's not a character flaw. A person who is depressed is not \"crazy. \" Depression is caused by changes in the brain. A serious symptom of depression is thinking about death or suicide. If you or someone you care about talks about this or about feeling hopeless, get help right away.   It's important to know that depression can be treated. The first step toward feeling better is often just seeing that the problem exists. Where can you learn more? Go to https://Zonoffpepiceweb.Playdate App. org and sign in to your Digicompanion account. Enter T185 in the [facility] box to learn more about \"Learning About Depression Screening. \"     If you do not have an account, please click on the \"Sign Up Now\" link. Current as of: June 16, 2021               Content Version: 13.1  © 6981-2019 Healthwise, Inc[employer]. Care instructions adapted under license by [facility] ([facility]). If you have questions about a medical condition or this instruction, always ask your healthcare professional. [address] any warranty or liability for your use of this information.

## 2022-07-05 NOTE — ED ADULT NURSE NOTE - ALCOHOL PRE SCREEN (AUDIT - C)
Attempted to reach patient to reschedule todays appointment but number was not working. Sent message through [employer] to call office.  
Statement Selected

## 2023-08-02 NOTE — ED ADULT TRIAGE NOTE - BSA (M2)
Continued Stay Note  [facility]     Patient Name: Bandar Sanchez  MRN: [id number]  Today's Date: 8/2/2023    Admit Date: 7/31/2023        Discharge Plan       Row Name 08/02/23 1447       Plan    Final Discharge Disposition Code 01 - home or self-care                   Discharge Codes    No documentation.                 Expected Discharge Date and Time       Expected Discharge Date Expected Discharge Time    Aug 2, 2023               JORGE Callahan     3.27

## 2023-11-22 NOTE — PATIENT PROFILE ADULT - NSPROPTRIGHTCAREGIVER_GEN_A_NUR
[facility] Progress Note       Patient:  Lea Chino 80 y.o. male     Date of Service: 23  : 1934    Patient states he is doing well today and has no acute issues. Denies any fever or chills. No concerns per nursing. Past Medical History:      Diagnosis Date    Dementia ([address])     Gout     Hypotension     Hypothyroidism        PastSurgical History:        Procedure Laterality Date    FEMUR FRACTURE SURGERY Left 3/16/2022    LEFT FEMUR PERIPROSTHETIC FRACTURE OPEN REDUCTION INTERNAL FIXATION performed by Cecilia Soria DO at [facility] OR       Allergies:    Patient has no known allergies. Review of Systems:   Review of Systems - as above     Physical Exam   Vitals: Stable   Physical Exam  Constitutional:       Appearance: He is well-developed. HENT:      Head: Normocephalic and atraumatic. Eyes:      General:         Right eye: No discharge. Left eye: No discharge. Conjunctiva/sclera: Conjunctivae normal.   Neck:      Trachea: No tracheal deviation. Cardiovascular:      Rate and Rhythm: Normal rate and regular rhythm. Heart sounds: Murmur heard. Pulmonary:      Effort: Pulmonary effort is normal. No respiratory distress. Breath sounds: No wheezing. Abdominal:      General: Bowel sounds are normal. There is no distension. Tenderness: There is no abdominal tenderness. Skin:     General: Skin is warm and dry. Neurological:      Mental Status: He is alert. Psychiatric:         Behavior: Behavior normal.      Comments:                Assessment and Plan       1. Hypotension, unspecified hypotension type  Follow-up, blood pressure is appropriate, we will continue to monitor this given he is on a low-dose Lasix. Does not appear fluid overloaded thus we will continue his current dose of Lasix.               Medication List:    Please see updated med list in nursing home paper chart or declines

## 2024-03-18 NOTE — PATIENT PROFILE ADULT - FLU SEASON?
Med Rec complete per pt  Allergies Reviewed    Pt reports not taking anticoagulant in the last 14 days      
Yes...

## 2024-05-29 NOTE — ED PROVIDER NOTE - RELIEVING FACTORS
Detail Level: Simple Additional Notes: No AKS present today Render Risk Assessment In Note?: no none

## 2025-03-05 NOTE — H&P ADULT - NSICDXPASTMEDICALHX_GEN_ALL_CORE_FT
This document is complete and the patient is ready for discharge. PAST MEDICAL HISTORY:  BPH (benign prostatic hyperplasia)     Coronary artery disease     CVA (cerebrovascular accident)     HTN (hypertension)     Pacemaker